# Patient Record
Sex: MALE | Race: BLACK OR AFRICAN AMERICAN | NOT HISPANIC OR LATINO | Employment: FULL TIME | URBAN - METROPOLITAN AREA
[De-identification: names, ages, dates, MRNs, and addresses within clinical notes are randomized per-mention and may not be internally consistent; named-entity substitution may affect disease eponyms.]

---

## 2024-09-05 ENCOUNTER — APPOINTMENT (OUTPATIENT)
Dept: CARDIOLOGY | Facility: HOSPITAL | Age: 39
DRG: 854 | End: 2024-09-05
Payer: COMMERCIAL

## 2024-09-05 ENCOUNTER — APPOINTMENT (OUTPATIENT)
Dept: RADIOLOGY | Facility: HOSPITAL | Age: 39
DRG: 854 | End: 2024-09-05
Payer: COMMERCIAL

## 2024-09-05 ENCOUNTER — HOSPITAL ENCOUNTER (INPATIENT)
Facility: HOSPITAL | Age: 39
End: 2024-09-05
Attending: EMERGENCY MEDICINE | Admitting: INTERNAL MEDICINE
Payer: COMMERCIAL

## 2024-09-05 DIAGNOSIS — M00.869 BACTERIAL INFECTION OF KNEE JOINT (MULTI): ICD-10-CM

## 2024-09-05 DIAGNOSIS — E13.69 OTHER SPECIFIED DIABETES MELLITUS WITH OTHER SPECIFIED COMPLICATION, WITHOUT LONG-TERM CURRENT USE OF INSULIN: ICD-10-CM

## 2024-09-05 DIAGNOSIS — R19.7 DIARRHEA, UNSPECIFIED TYPE: ICD-10-CM

## 2024-09-05 DIAGNOSIS — Z98.890 STATUS POST INCISION AND DRAINAGE: ICD-10-CM

## 2024-09-05 DIAGNOSIS — N17.9 AKI (ACUTE KIDNEY INJURY) (CMS-HCC): ICD-10-CM

## 2024-09-05 DIAGNOSIS — R60.0 PEDAL EDEMA: ICD-10-CM

## 2024-09-05 DIAGNOSIS — D72.825 BANDEMIA: ICD-10-CM

## 2024-09-05 DIAGNOSIS — I10 PRIMARY HYPERTENSION: ICD-10-CM

## 2024-09-05 DIAGNOSIS — A41.9 SEPSIS (MULTI): Primary | ICD-10-CM

## 2024-09-05 DIAGNOSIS — E11.9 CONTROLLED TYPE 2 DIABETES MELLITUS WITHOUT COMPLICATION, WITHOUT LONG-TERM CURRENT USE OF INSULIN (MULTI): ICD-10-CM

## 2024-09-05 LAB
ALBUMIN SERPL BCP-MCNC: 3.9 G/DL (ref 3.4–5)
ALP SERPL-CCNC: 79 U/L (ref 33–120)
ALT SERPL W P-5'-P-CCNC: 73 U/L (ref 10–52)
ANION GAP SERPL CALC-SCNC: 20 MMOL/L (ref 10–20)
APAP SERPL-MCNC: <10 UG/ML
AST SERPL W P-5'-P-CCNC: 57 U/L (ref 9–39)
BASOPHILS # BLD MANUAL: 0.25 X10*3/UL (ref 0–0.1)
BASOPHILS NFR BLD MANUAL: 1 %
BILIRUB SERPL-MCNC: 0.6 MG/DL (ref 0–1.2)
BUN SERPL-MCNC: 29 MG/DL (ref 6–23)
CALCIUM SERPL-MCNC: 8.2 MG/DL (ref 8.6–10.3)
CARDIAC TROPONIN I PNL SERPL HS: 14 NG/L (ref 0–20)
CHLORIDE SERPL-SCNC: 95 MMOL/L (ref 98–107)
CK SERPL-CCNC: 49 U/L (ref 0–325)
CO2 SERPL-SCNC: 23 MMOL/L (ref 21–32)
CREAT SERPL-MCNC: 2.66 MG/DL (ref 0.5–1.3)
CRP SERPL-MCNC: 40.8 MG/DL
EGFRCR SERPLBLD CKD-EPI 2021: 30 ML/MIN/1.73M*2
EOSINOPHIL # BLD MANUAL: 0 X10*3/UL (ref 0–0.7)
EOSINOPHIL NFR BLD MANUAL: 0 %
ERYTHROCYTE [DISTWIDTH] IN BLOOD BY AUTOMATED COUNT: 12.5 % (ref 11.5–14.5)
ERYTHROCYTE [SEDIMENTATION RATE] IN BLOOD BY WESTERGREN METHOD: 106 MM/H (ref 0–15)
FLUAV RNA RESP QL NAA+PROBE: NOT DETECTED
FLUBV RNA RESP QL NAA+PROBE: NOT DETECTED
GLUCOSE SERPL-MCNC: 192 MG/DL (ref 74–99)
HCT VFR BLD AUTO: 42.8 % (ref 41–52)
HGB BLD-MCNC: 15.1 G/DL (ref 13.5–17.5)
IMM GRANULOCYTES # BLD AUTO: 2.06 X10*3/UL (ref 0–0.7)
IMM GRANULOCYTES NFR BLD AUTO: 8.3 % (ref 0–0.9)
LACTATE SERPL-SCNC: 1.9 MMOL/L (ref 0.4–2)
LIPASE SERPL-CCNC: 25 U/L (ref 9–82)
LYMPHOCYTES # BLD MANUAL: 1.24 X10*3/UL (ref 1.2–4.8)
LYMPHOCYTES NFR BLD MANUAL: 5 %
MCH RBC QN AUTO: 27.9 PG (ref 26–34)
MCHC RBC AUTO-ENTMCNC: 35.3 G/DL (ref 32–36)
MCV RBC AUTO: 79 FL (ref 80–100)
MONOCYTES # BLD MANUAL: 0.49 X10*3/UL (ref 0.1–1)
MONOCYTES NFR BLD MANUAL: 2 %
NEUTROPHILS # BLD MANUAL: 22.72 X10*3/UL (ref 1.2–7.7)
NEUTS BAND # BLD MANUAL: 3.95 X10*3/UL (ref 0–0.7)
NEUTS BAND NFR BLD MANUAL: 16 %
NEUTS SEG # BLD MANUAL: 18.77 X10*3/UL (ref 1.2–7)
NEUTS SEG NFR BLD MANUAL: 76 %
NRBC BLD-RTO: 0 /100 WBCS (ref 0–0)
PLATELET # BLD AUTO: 206 X10*3/UL (ref 150–450)
POTASSIUM SERPL-SCNC: 3.7 MMOL/L (ref 3.5–5.3)
PROT SERPL-MCNC: 8 G/DL (ref 6.4–8.2)
RBC # BLD AUTO: 5.41 X10*6/UL (ref 4.5–5.9)
RBC MORPH BLD: ABNORMAL
S PYO DNA THROAT QL NAA+PROBE: NOT DETECTED
SARS-COV-2 RNA RESP QL NAA+PROBE: NOT DETECTED
SODIUM SERPL-SCNC: 134 MMOL/L (ref 136–145)
TOTAL CELLS COUNTED BLD: 100
TSH SERPL-ACNC: 4.22 MIU/L (ref 0.44–3.98)
WBC # BLD AUTO: 24.7 X10*3/UL (ref 4.4–11.3)

## 2024-09-05 PROCEDURE — 85652 RBC SED RATE AUTOMATED: CPT | Performed by: EMERGENCY MEDICINE

## 2024-09-05 PROCEDURE — 2500000004 HC RX 250 GENERAL PHARMACY W/ HCPCS (ALT 636 FOR OP/ED): Performed by: INTERNAL MEDICINE

## 2024-09-05 PROCEDURE — 70450 CT HEAD/BRAIN W/O DYE: CPT | Performed by: RADIOLOGY

## 2024-09-05 PROCEDURE — 86140 C-REACTIVE PROTEIN: CPT | Performed by: EMERGENCY MEDICINE

## 2024-09-05 PROCEDURE — 82550 ASSAY OF CK (CPK): CPT | Performed by: EMERGENCY MEDICINE

## 2024-09-05 PROCEDURE — 99285 EMERGENCY DEPT VISIT HI MDM: CPT | Mod: 25

## 2024-09-05 PROCEDURE — 2500000005 HC RX 250 GENERAL PHARMACY W/O HCPCS: Performed by: EMERGENCY MEDICINE

## 2024-09-05 PROCEDURE — 93005 ELECTROCARDIOGRAM TRACING: CPT

## 2024-09-05 PROCEDURE — 84443 ASSAY THYROID STIM HORMONE: CPT | Performed by: EMERGENCY MEDICINE

## 2024-09-05 PROCEDURE — 84484 ASSAY OF TROPONIN QUANT: CPT | Performed by: EMERGENCY MEDICINE

## 2024-09-05 PROCEDURE — 74176 CT ABD & PELVIS W/O CONTRAST: CPT | Performed by: RADIOLOGY

## 2024-09-05 PROCEDURE — 87476 LYME DIS DNA AMP PROBE: CPT | Performed by: EMERGENCY MEDICINE

## 2024-09-05 PROCEDURE — 96365 THER/PROPH/DIAG IV INF INIT: CPT

## 2024-09-05 PROCEDURE — 87651 STREP A DNA AMP PROBE: CPT | Performed by: EMERGENCY MEDICINE

## 2024-09-05 PROCEDURE — 96366 THER/PROPH/DIAG IV INF ADDON: CPT

## 2024-09-05 PROCEDURE — 80143 DRUG ASSAY ACETAMINOPHEN: CPT | Performed by: EMERGENCY MEDICINE

## 2024-09-05 PROCEDURE — 71046 X-RAY EXAM CHEST 2 VIEWS: CPT

## 2024-09-05 PROCEDURE — 87181 SC STD AGAR DILUTION PER AGT: CPT | Mod: AHULAB | Performed by: EMERGENCY MEDICINE

## 2024-09-05 PROCEDURE — 71250 CT THORAX DX C-: CPT | Performed by: RADIOLOGY

## 2024-09-05 PROCEDURE — 96375 TX/PRO/DX INJ NEW DRUG ADDON: CPT

## 2024-09-05 PROCEDURE — 87636 SARSCOV2 & INF A&B AMP PRB: CPT | Performed by: EMERGENCY MEDICINE

## 2024-09-05 PROCEDURE — 85027 COMPLETE CBC AUTOMATED: CPT | Performed by: EMERGENCY MEDICINE

## 2024-09-05 PROCEDURE — 87077 CULTURE AEROBIC IDENTIFY: CPT | Mod: AHULAB | Performed by: EMERGENCY MEDICINE

## 2024-09-05 PROCEDURE — 83690 ASSAY OF LIPASE: CPT | Performed by: EMERGENCY MEDICINE

## 2024-09-05 PROCEDURE — 71046 X-RAY EXAM CHEST 2 VIEWS: CPT | Performed by: RADIOLOGY

## 2024-09-05 PROCEDURE — 70450 CT HEAD/BRAIN W/O DYE: CPT

## 2024-09-05 PROCEDURE — 1210000001 HC SEMI-PRIVATE ROOM DAILY

## 2024-09-05 PROCEDURE — 2500000004 HC RX 250 GENERAL PHARMACY W/ HCPCS (ALT 636 FOR OP/ED): Performed by: EMERGENCY MEDICINE

## 2024-09-05 PROCEDURE — 96367 TX/PROPH/DG ADDL SEQ IV INF: CPT

## 2024-09-05 PROCEDURE — 74176 CT ABD & PELVIS W/O CONTRAST: CPT

## 2024-09-05 PROCEDURE — 83605 ASSAY OF LACTIC ACID: CPT | Performed by: EMERGENCY MEDICINE

## 2024-09-05 PROCEDURE — 36415 COLL VENOUS BLD VENIPUNCTURE: CPT | Performed by: EMERGENCY MEDICINE

## 2024-09-05 PROCEDURE — 84439 ASSAY OF FREE THYROXINE: CPT | Performed by: EMERGENCY MEDICINE

## 2024-09-05 PROCEDURE — 80053 COMPREHEN METABOLIC PANEL: CPT | Performed by: EMERGENCY MEDICINE

## 2024-09-05 PROCEDURE — 85007 BL SMEAR W/DIFF WBC COUNT: CPT | Performed by: EMERGENCY MEDICINE

## 2024-09-05 RX ORDER — SODIUM CHLORIDE, SODIUM LACTATE, POTASSIUM CHLORIDE, CALCIUM CHLORIDE 600; 310; 30; 20 MG/100ML; MG/100ML; MG/100ML; MG/100ML
125 INJECTION, SOLUTION INTRAVENOUS CONTINUOUS
Status: ACTIVE | OUTPATIENT
Start: 2024-09-05

## 2024-09-05 RX ORDER — PANTOPRAZOLE SODIUM 40 MG/1
40 TABLET, DELAYED RELEASE ORAL
Status: DISCONTINUED | OUTPATIENT
Start: 2024-09-06 | End: 2024-09-06 | Stop reason: SDUPTHER

## 2024-09-05 RX ORDER — TALC
3 POWDER (GRAM) TOPICAL NIGHTLY PRN
Status: DISPENSED | OUTPATIENT
Start: 2024-09-05

## 2024-09-05 RX ORDER — ONDANSETRON HYDROCHLORIDE 2 MG/ML
4 INJECTION, SOLUTION INTRAVENOUS EVERY 6 HOURS PRN
Status: DISCONTINUED | OUTPATIENT
Start: 2024-09-05 | End: 2024-09-06 | Stop reason: SDUPTHER

## 2024-09-05 RX ORDER — AMLODIPINE BESYLATE 5 MG/1
5 TABLET ORAL DAILY
Status: DISPENSED | OUTPATIENT
Start: 2024-09-06

## 2024-09-05 RX ORDER — HYDROMORPHONE HYDROCHLORIDE 0.2 MG/ML
0.2 INJECTION INTRAMUSCULAR; INTRAVENOUS; SUBCUTANEOUS
Status: DISCONTINUED | OUTPATIENT
Start: 2024-09-05 | End: 2024-09-06

## 2024-09-05 RX ORDER — TRAMADOL HYDROCHLORIDE 50 MG/1
50 TABLET ORAL EVERY 6 HOURS PRN
Status: DISPENSED | OUTPATIENT
Start: 2024-09-05

## 2024-09-05 RX ORDER — SODIUM CHLORIDE 9 MG/ML
100 INJECTION, SOLUTION INTRAVENOUS CONTINUOUS
Status: ACTIVE | OUTPATIENT
Start: 2024-09-05

## 2024-09-05 RX ORDER — ACETAMINOPHEN 325 MG/1
975 TABLET ORAL ONCE
Status: COMPLETED | OUTPATIENT
Start: 2024-09-05 | End: 2024-09-05

## 2024-09-05 RX ORDER — ALUMINUM HYDROXIDE, MAGNESIUM HYDROXIDE, AND SIMETHICONE 1200; 120; 1200 MG/30ML; MG/30ML; MG/30ML
20 SUSPENSION ORAL 4 TIMES DAILY PRN
Status: ACTIVE | OUTPATIENT
Start: 2024-09-05

## 2024-09-05 RX ORDER — MORPHINE SULFATE 2 MG/ML
2 INJECTION, SOLUTION INTRAMUSCULAR; INTRAVENOUS ONCE
Status: COMPLETED | OUTPATIENT
Start: 2024-09-05 | End: 2024-09-05

## 2024-09-05 RX ORDER — ACETAMINOPHEN 325 MG/1
650 TABLET ORAL EVERY 6 HOURS PRN
Status: DISCONTINUED | OUTPATIENT
Start: 2024-09-05 | End: 2024-09-06 | Stop reason: SDUPTHER

## 2024-09-05 ASSESSMENT — PAIN DESCRIPTION - FREQUENCY: FREQUENCY: CONSTANT/CONTINUOUS

## 2024-09-05 ASSESSMENT — PAIN SCALES - GENERAL
PAINLEVEL_OUTOF10: 10 - WORST POSSIBLE PAIN
PAINLEVEL_OUTOF10: 3
PAINLEVEL_OUTOF10: 10 - WORST POSSIBLE PAIN

## 2024-09-05 ASSESSMENT — COLUMBIA-SUICIDE SEVERITY RATING SCALE - C-SSRS
6. HAVE YOU EVER DONE ANYTHING, STARTED TO DO ANYTHING, OR PREPARED TO DO ANYTHING TO END YOUR LIFE?: NO
2. HAVE YOU ACTUALLY HAD ANY THOUGHTS OF KILLING YOURSELF?: NO
1. IN THE PAST MONTH, HAVE YOU WISHED YOU WERE DEAD OR WISHED YOU COULD GO TO SLEEP AND NOT WAKE UP?: NO

## 2024-09-05 ASSESSMENT — PAIN DESCRIPTION - LOCATION
LOCATION: OTHER (COMMENT)
LOCATION: CHEST
LOCATION: LEG

## 2024-09-05 ASSESSMENT — PAIN - FUNCTIONAL ASSESSMENT
PAIN_FUNCTIONAL_ASSESSMENT: 0-10
PAIN_FUNCTIONAL_ASSESSMENT: 0-10

## 2024-09-05 NOTE — ED TRIAGE NOTES
Pt presents to the ED from home with c/o body pain. Pt states this started yesterday around 9 am and it started with a headache, which pt states is the worst of his life. Pt states he can't endorse numbness or tingling, but pt did limp into triage and states he doesn't usually ambulate this way. Pt endorses headache is worsening throughout the day. Pt having difficulty moving certain extremities. Pt took ibuprofen.

## 2024-09-05 NOTE — ED TRIAGE NOTES
TRIAGE NOTE   I saw the patient as the Clinician in Triage and performed a brief history and physical exam, established acuity, and ordered appropriate tests to develop basic plan of care. Patient will be seen by an IVETTE, resident and/or physician who will independently evaluate the patient. Please see subsequent provider notes for further details and disposition.     Brief HPI: In brief, Gabe Linder Jr. is a 39 y.o. male that presents for headache, nausea, vomiting, diarrhea, diffuse bodyaches progressing over the last 48 hours.  Notes difficulty ambulating due to diffuse myalgias/arthralgias, denies any unilateral numbness or weakness in any of his extremities.  Notes headache was progressive in onset, worsened over the course of the day, was not maximal at onset.     Focused Physical exam:   Uncomfortable appearing, tachycardic, moderate lower abdominal tenderness worse over the right lower quadrant.  No CVA tenderness.  Intact strength, sensation bilateral upper and lower extremities, no tremor, clonus.  Full range of motion of the neck without pain.  Plan/MDM:   Patient with myalgias, nausea, vomiting, headache, fatigue, concern for precipitating infectious etiology namely intra-abdominal source given lower abdominal tenderness.  Concern for rhabdomyolysis/severe viral syndrome given diffuse myalgias, no isolated joint swelling or pain to suggest septic arthritis.  No focal neurological deficit on examination to suggest CVA, spinal pathology, patient does have a significant headache but was not maximal at onset, has no meningeal findings on examination, lower suspicion for subarachnoid hemorrhage, ICH, CNS infectious process.  Will assess with labs, imaging, urinalysis, viral testing.  Disposition pending above, reassessment.  Please see subsequent provider note for further details and disposition

## 2024-09-06 LAB
ANION GAP SERPL CALC-SCNC: 16 MMOL/L (ref 10–20)
APPEARANCE UR: CLEAR
BILIRUB UR STRIP.AUTO-MCNC: NEGATIVE MG/DL
BUN SERPL-MCNC: 17 MG/DL (ref 6–23)
C TRACH RRNA SPEC QL NAA+PROBE: NEGATIVE
CALCIUM SERPL-MCNC: 7.5 MG/DL (ref 8.6–10.3)
CHLORIDE SERPL-SCNC: 95 MMOL/L (ref 98–107)
CO2 SERPL-SCNC: 23 MMOL/L (ref 21–32)
COLOR UR: ABNORMAL
CREAT SERPL-MCNC: 1.09 MG/DL (ref 0.5–1.3)
CREAT UR-MCNC: 141 MG/DL (ref 20–370)
EGFRCR SERPLBLD CKD-EPI 2021: 88 ML/MIN/1.73M*2
ERYTHROCYTE [DISTWIDTH] IN BLOOD BY AUTOMATED COUNT: 12.3 % (ref 11.5–14.5)
GLUCOSE SERPL-MCNC: 211 MG/DL (ref 74–99)
GLUCOSE UR STRIP.AUTO-MCNC: NORMAL MG/DL
HAV IGM SER QL: NONREACTIVE
HBV CORE IGM SER QL: NONREACTIVE
HBV SURFACE AG SERPL QL IA: NONREACTIVE
HCT VFR BLD AUTO: 37 % (ref 41–52)
HCV AB SER QL: NONREACTIVE
HGB BLD-MCNC: 12.5 G/DL (ref 13.5–17.5)
KETONES UR STRIP.AUTO-MCNC: NEGATIVE MG/DL
LEUKOCYTE ESTERASE UR QL STRIP.AUTO: NEGATIVE
MCH RBC QN AUTO: 26.9 PG (ref 26–34)
MCHC RBC AUTO-ENTMCNC: 33.8 G/DL (ref 32–36)
MCV RBC AUTO: 80 FL (ref 80–100)
MUCOUS THREADS #/AREA URNS AUTO: NORMAL /LPF
N GONORRHOEA DNA SPEC QL PROBE+SIG AMP: NEGATIVE
NITRITE UR QL STRIP.AUTO: NEGATIVE
NRBC BLD-RTO: 0 /100 WBCS (ref 0–0)
PH UR STRIP.AUTO: 5.5 [PH]
PLATELET # BLD AUTO: 170 X10*3/UL (ref 150–450)
POTASSIUM SERPL-SCNC: 3.5 MMOL/L (ref 3.5–5.3)
PROT UR STRIP.AUTO-MCNC: ABNORMAL MG/DL
RBC # BLD AUTO: 4.65 X10*6/UL (ref 4.5–5.9)
RBC # UR STRIP.AUTO: ABNORMAL /UL
RBC #/AREA URNS AUTO: NORMAL /HPF
SODIUM SERPL-SCNC: 130 MMOL/L (ref 136–145)
SODIUM UR-SCNC: 46 MMOL/L
SODIUM/CREAT UR-RTO: 33 MMOL/G CREAT
SP GR UR STRIP.AUTO: 1.02
T4 FREE SERPL-MCNC: 1.08 NG/DL (ref 0.61–1.12)
URATE CRY #/AREA UR COMP ASSIST: NORMAL /HPF
UROBILINOGEN UR STRIP.AUTO-MCNC: NORMAL MG/DL
WBC # BLD AUTO: 20.9 X10*3/UL (ref 4.4–11.3)
WBC #/AREA URNS AUTO: NORMAL /HPF

## 2024-09-06 PROCEDURE — 86038 ANTINUCLEAR ANTIBODIES: CPT | Mod: AHULAB | Performed by: INTERNAL MEDICINE

## 2024-09-06 PROCEDURE — 87506 IADNA-DNA/RNA PROBE TQ 6-11: CPT | Mod: AHULAB | Performed by: EMERGENCY MEDICINE

## 2024-09-06 PROCEDURE — 85027 COMPLETE CBC AUTOMATED: CPT | Performed by: INTERNAL MEDICINE

## 2024-09-06 PROCEDURE — 99222 1ST HOSP IP/OBS MODERATE 55: CPT | Performed by: INTERNAL MEDICINE

## 2024-09-06 PROCEDURE — 36415 COLL VENOUS BLD VENIPUNCTURE: CPT | Performed by: INTERNAL MEDICINE

## 2024-09-06 PROCEDURE — 1100000001 HC PRIVATE ROOM DAILY

## 2024-09-06 PROCEDURE — 80074 ACUTE HEPATITIS PANEL: CPT | Mod: AHULAB | Performed by: EMERGENCY MEDICINE

## 2024-09-06 PROCEDURE — 80048 BASIC METABOLIC PNL TOTAL CA: CPT | Performed by: INTERNAL MEDICINE

## 2024-09-06 PROCEDURE — 36415 COLL VENOUS BLD VENIPUNCTURE: CPT | Performed by: EMERGENCY MEDICINE

## 2024-09-06 PROCEDURE — 84300 ASSAY OF URINE SODIUM: CPT | Performed by: EMERGENCY MEDICINE

## 2024-09-06 PROCEDURE — 2500000001 HC RX 250 WO HCPCS SELF ADMINISTERED DRUGS (ALT 637 FOR MEDICARE OP): Performed by: INTERNAL MEDICINE

## 2024-09-06 PROCEDURE — 82570 ASSAY OF URINE CREATININE: CPT | Performed by: EMERGENCY MEDICINE

## 2024-09-06 PROCEDURE — 87491 CHLMYD TRACH DNA AMP PROBE: CPT | Mod: AHULAB | Performed by: EMERGENCY MEDICINE

## 2024-09-06 PROCEDURE — 2500000004 HC RX 250 GENERAL PHARMACY W/ HCPCS (ALT 636 FOR OP/ED): Performed by: INTERNAL MEDICINE

## 2024-09-06 PROCEDURE — 81003 URINALYSIS AUTO W/O SCOPE: CPT | Performed by: EMERGENCY MEDICINE

## 2024-09-06 RX ORDER — ONDANSETRON 4 MG/1
4 TABLET, FILM COATED ORAL EVERY 8 HOURS PRN
Status: ACTIVE | OUTPATIENT
Start: 2024-09-06

## 2024-09-06 RX ORDER — ACETAMINOPHEN 160 MG/5ML
650 SOLUTION ORAL EVERY 4 HOURS PRN
Status: DISPENSED | OUTPATIENT
Start: 2024-09-06

## 2024-09-06 RX ORDER — GUAIFENESIN 600 MG/1
600 TABLET, EXTENDED RELEASE ORAL EVERY 12 HOURS PRN
Status: ACTIVE | OUTPATIENT
Start: 2024-09-06

## 2024-09-06 RX ORDER — ACETAMINOPHEN 650 MG/1
650 SUPPOSITORY RECTAL EVERY 4 HOURS PRN
Status: ACTIVE | OUTPATIENT
Start: 2024-09-06

## 2024-09-06 RX ORDER — ENOXAPARIN SODIUM 100 MG/ML
40 INJECTION SUBCUTANEOUS EVERY 24 HOURS
Status: DISPENSED | OUTPATIENT
Start: 2024-09-06

## 2024-09-06 RX ORDER — POLYETHYLENE GLYCOL 3350 17 G/17G
17 POWDER, FOR SOLUTION ORAL DAILY PRN
Status: ACTIVE | OUTPATIENT
Start: 2024-09-06

## 2024-09-06 RX ORDER — PANTOPRAZOLE SODIUM 40 MG/1
40 TABLET, DELAYED RELEASE ORAL
Status: DISPENSED | OUTPATIENT
Start: 2024-09-06

## 2024-09-06 RX ORDER — ACETAMINOPHEN 325 MG/1
650 TABLET ORAL EVERY 4 HOURS PRN
Status: DISPENSED | OUTPATIENT
Start: 2024-09-06

## 2024-09-06 RX ORDER — OXYCODONE HYDROCHLORIDE 5 MG/1
5 TABLET ORAL EVERY 6 HOURS PRN
Status: DISCONTINUED | OUTPATIENT
Start: 2024-09-06 | End: 2024-09-06

## 2024-09-06 RX ORDER — ONDANSETRON HYDROCHLORIDE 2 MG/ML
4 INJECTION, SOLUTION INTRAVENOUS EVERY 8 HOURS PRN
Status: ACTIVE | OUTPATIENT
Start: 2024-09-06

## 2024-09-06 RX ORDER — PANTOPRAZOLE SODIUM 40 MG/10ML
40 INJECTION, POWDER, LYOPHILIZED, FOR SOLUTION INTRAVENOUS
Status: ACTIVE | OUTPATIENT
Start: 2024-09-06

## 2024-09-06 ASSESSMENT — COGNITIVE AND FUNCTIONAL STATUS - GENERAL
MOBILITY SCORE: 14
DRESSING REGULAR UPPER BODY CLOTHING: A LITTLE
HELP NEEDED FOR BATHING: A LOT
TURNING FROM BACK TO SIDE WHILE IN FLAT BAD: A LITTLE
DRESSING REGULAR LOWER BODY CLOTHING: A LITTLE
STANDING UP FROM CHAIR USING ARMS: A LOT
MOVING TO AND FROM BED TO CHAIR: A LOT
WALKING IN HOSPITAL ROOM: A LOT
CLIMB 3 TO 5 STEPS WITH RAILING: A LOT
MOVING FROM LYING ON BACK TO SITTING ON SIDE OF FLAT BED WITH BEDRAILS: A LITTLE
TOILETING: A LITTLE
DAILY ACTIVITIY SCORE: 19

## 2024-09-06 ASSESSMENT — ACTIVITIES OF DAILY LIVING (ADL): LACK_OF_TRANSPORTATION: NO

## 2024-09-06 ASSESSMENT — PAIN SCALES - GENERAL
PAINLEVEL_OUTOF10: 10 - WORST POSSIBLE PAIN
PAINLEVEL_OUTOF10: 10 - WORST POSSIBLE PAIN
PAINLEVEL_OUTOF10: 0 - NO PAIN
PAINLEVEL_OUTOF10: 8
PAINLEVEL_OUTOF10: 10 - WORST POSSIBLE PAIN
PAINLEVEL_OUTOF10: 8

## 2024-09-06 ASSESSMENT — PAIN DESCRIPTION - LOCATION
LOCATION: OTHER (COMMENT)
LOCATION: KNEE
LOCATION: LEG

## 2024-09-06 ASSESSMENT — PAIN SCALES - WONG BAKER
WONGBAKER_NUMERICALRESPONSE: HURTS WORST
WONGBAKER_NUMERICALRESPONSE: HURTS WORST

## 2024-09-06 ASSESSMENT — PAIN - FUNCTIONAL ASSESSMENT
PAIN_FUNCTIONAL_ASSESSMENT: 0-10

## 2024-09-06 ASSESSMENT — PAIN DESCRIPTION - DESCRIPTORS: DESCRIPTORS: THROBBING

## 2024-09-06 ASSESSMENT — PAIN DESCRIPTION - ORIENTATION: ORIENTATION: LEFT

## 2024-09-06 NOTE — PROGRESS NOTES
Pharmacy Medication History Review   Spoke to the patient. Takes no Medications    Gabe Linder Jr. is a 39 y.o. male admitted for Sepsis (Multi). Pharmacy reviewed the patient's oyxyq-sm-jiehqmyrg medications and allergies for accuracy.    The list below reflectives the updated PTA list. Please review each medication in order reconciliation for additional clarification and justification.       The list below reflectives the updated allergy list. Please review each documented allergy for additional clarification and justification.  Allergies  Reviewed by Danae eWlls RN on 9/5/2024        Severity Reactions Comments    Shellfish Containing Products High Anaphylaxis             Below are additional concerns with the patient's PTA list.      Naomy Lua

## 2024-09-06 NOTE — PROGRESS NOTES
Transitional Care Coordination Progress Note:  Plan per Medical/Surgical team: treatment of sepsis with IV ATB, IV fluids, ID consult   Status: Inpatient   Payor source: [place]  Discharge disposition: Home with sign other   Potential Barriers: , Bp 161/106, renal 29/2.66  ADOD: 9/8/2024   LEONID Kirby RN, BSN Transitional Care Coordinator ED# [phone]      09/06/24 0753   Discharge Planning   Living Arrangements Spouse/significant other;Children   Support Systems Spouse/significant other   Assistance Needed ATB plan per ID   Type of Residence Private residence   Number of Stairs to Enter Residence 3   Number of Stairs Within Residence 12   Home or Post Acute Services None   Expected Discharge Disposition Home   Does the patient need discharge transport arranged? No   Financial Resource Strain   How hard is it for you to pay for the very basics like food, housing, medical care, and heating? Not hard   Housing Stability   In the last 12 months, was there a time when you were not able to pay the mortgage or rent on time? N   In the past 12 months, how many times have you moved where you were living? 1   At any time in the past 12 months, were you homeless or living in a shelter (including now)? N   Transportation Needs   In the past 12 months, has lack of transportation kept you from medical appointments or from getting medications? no   In the past 12 months, has lack of transportation kept you from meetings, work, or from getting things needed for daily living? No

## 2024-09-06 NOTE — ED PROVIDER NOTES
HPI   Chief Complaint   Patient presents with    Body Pain       The patient is a 39-year-old male with negligible past medical history presenting with bodyaches, nausea, vomiting, headache.  Notes that symptoms began yesterday, notes developing nausea, loose stools, had an episode of emesis yesterday.  Notes that at time of index episode of vomiting did develop a moderate headache that is progressively worsened since onset, notes that headache did become unbearable today prompting ED evaluation.  Also notes diffuse pain in his upper and lower extremities, worsened with movement, does not localize this to any 1 joint or extremity, describes pain as equal in all 4 extremities.  Notes pain precluding normal movement of all 4 extremities.  Denies any numbness or weakness in any of his extremities.  Denies any vision changes.  Denies any neck pain.  Denies any sick contacts, denies any recent travel, animal exposures.  Denies any fevers.  Did take ibuprofen for pain over the last 2 days, states that he took approximately 14 tablets over the last 48 hours.  Denies any acetaminophen/salicylate use.  Denies any IV drug use.  Denies any indwelling medical hardware.  Denies any bowel or bladder incontinence.  Denies other recent illness or injury.              Patient History   No past medical history on file.  No past surgical history on file.  No family history on file.  Social History     Tobacco Use    Smoking status: Not on file    Smokeless tobacco: Not on file   Substance Use Topics    Alcohol use: Not on file    Drug use: Not on file       Physical Exam   ED Triage Vitals   Temperature Heart Rate Respirations BP   09/05/24 1545 09/05/24 1545 09/05/24 1545 09/05/24 1545   36.8 °C (98.3 °F) (!) 120 18 126/89      Pulse Ox Temp Source Heart Rate Source Patient Position   09/05/24 1545 09/05/24 1545 09/05/24 1804 --   98 % Oral Monitor       BP Location FiO2 (%)     09/05/24 1804 --     Right arm        Physical  Exam  Vitals and nursing note reviewed.   Constitutional:       General: He is not in acute distress.     Appearance: Normal appearance. He is not ill-appearing or toxic-appearing.      Comments: Mildly uncomfortable appearing   HENT:      Head: Normocephalic and atraumatic. No right periorbital erythema or left periorbital erythema.      Jaw: There is normal jaw occlusion.      Nose: No congestion or rhinorrhea.      Mouth/Throat:      Mouth: Mucous membranes are moist.      Pharynx: Oropharynx is clear. No pharyngeal swelling, oropharyngeal exudate or posterior oropharyngeal erythema.   Eyes:      Extraocular Movements: Extraocular movements intact.      Right eye: Normal extraocular motion and no nystagmus.      Left eye: Normal extraocular motion and no nystagmus.      Conjunctiva/sclera: Conjunctivae normal.      Pupils: Pupils are equal, round, and reactive to light.   Neck:      Thyroid: No thyroid tenderness.      Comments: No midline neck pain with flexion/extension.  Cardiovascular:      Rate and Rhythm: Regular rhythm. Tachycardia present.      Pulses: Normal pulses.           Radial pulses are 2+ on the right side and 2+ on the left side.      Heart sounds: Normal heart sounds, S1 normal and S2 normal. No murmur heard.     No friction rub. No gallop.   Pulmonary:      Effort: Pulmonary effort is normal. No respiratory distress.      Breath sounds: Normal breath sounds. No stridor. No wheezing, rhonchi or rales.   Abdominal:      General: Abdomen is flat. Bowel sounds are normal. There is no distension.      Palpations: Abdomen is soft.      Tenderness: There is generalized abdominal tenderness. There is no right CVA tenderness, left CVA tenderness, guarding or rebound.   Musculoskeletal:      Cervical back: Full passive range of motion without pain, normal range of motion and neck supple. No spinous process tenderness or muscular tenderness. Normal range of motion.      Right lower leg: No edema.       Left lower leg: No edema.      Comments: Diffuse tenderness over bilateral upper and lower extremities.  No edema.  No erythema, no joint effusions noted at the elbows, knees, ankles.  Able to flex/extend elbow, wrist, knee, ankle joints fully but with significant discomfort   Skin:     General: Skin is warm and dry.      Capillary Refill: Capillary refill takes less than 2 seconds.   Neurological:      General: No focal deficit present.      Mental Status: He is alert and oriented to person, place, and time.      GCS: GCS eye subscore is 4. GCS verbal subscore is 5. GCS motor subscore is 6.      Cranial Nerves: Cranial nerves 2-12 are intact. No cranial nerve deficit.      Sensory: No sensory deficit.      Motor: No weakness, tremor or abnormal muscle tone.      Deep Tendon Reflexes:      Reflex Scores:       Patellar reflexes are 2+ on the right side and 2+ on the left side.     Comments: 1a  Level of consciousness: 0=alert; keenly responsive  1b. LOC questions:  0=Performs both tasks correctly  1c. LOC commands: 0=Performs both tasks correctly  2.  Best Gaze: 0=normal  3. Visual: 0=No visual loss  4. Facial Palsy: 0=Normal symmetric movement  5a. Motor left arm: 0=No drift, limb holds 90 (or 45) degrees for full 10 seconds  5b.  Motor right arm: 0=No drift, limb holds 90 (or 45) degrees for full 10 seconds  6a. motor left le=No drift, limb holds 90 (or 45) degrees for full 10 seconds  6b  Motor right le=No drift, limb holds 90 (or 45) degrees for full 10 seconds  7. Limb Ataxia: 0=Absent  8.  Sensory: 0=Normal; no sensory loss  9. Best Language:  0=No aphasia, normal  10. Dysarthria: 0=Normal  11. Extinction and Inattention: 0=No abnormality    Total:   0        VAN: VAN: Negative       Psychiatric:         Mood and Affect: Mood normal.           ED Course & MDM   Diagnoses as of 24 [id number]   RUPERTO (acute kidney injury) (CMS-formerly [facility])   Bandemia   Diarrhea, unspecified type   Sepsis (Multi)                  No data recorded     [place] Coma Scale Score: 15 (09/05/24 1544 : Danae Wells RN)                           Medical Decision Making  ECG: Sinus tachycardia, rate 124, QRS 88, QTc 476.  No ST changes meeting STEMI criteria, no evidence of preexcitation syndrome/dysrhythmia.    Patient presenting with diffuse myalgias/arthralgias, headache, vomiting.  Overall seems most consistent with a significant viral syndrome but will assess for other precipitating infectious process with labs, urinalysis, chest x-ray.  Patient does describe a headache but notes that it progressively worsened over the course the last day, was not maximal at onset, has been gradually improving since arrival to the emergency department without intervention.  Lower suspicion for intracranial hemorrhage/subarachnoid hemorrhage as pain was not maximal at onset and has a benign neurological examination.  Consideration for CNS infectious process however patient is not altered, has no rash, does not appear to have any meningeal findings on examination.  Consideration for acute intra-abdominal pathology namely colitis, gastroenteritis, appendicitis, diverticulitis, hepatobiliary pathology therefore obtain CT imaging the abdomen and pelvis.  Given associated headache will obtain CT head as well.    Chest x-ray negative, labs remarkable for markedly elevated creatinine from prior concerning for RUPERTO, did obtain CK out of concern for rhabdomyolysis precipitating RUPERTO, this was unremarkable.  Cell count showing leukocytosis with bandemia therefore blood cultures were obtained and broad-spectrum antibiotics were initiated, CT imaging of the chest as well as abdomen and pelvis were obtained to assess for source of infection which showed no acute intrathoracic/intra-abdominal pathology.  CT head negative as well.  Did reassess the patient and continues to note resolution of headache, continues to demonstrate no meningeal findings on examination to suggest  CNS infectious process, at this point given low pretest probability for CNS infectious etiology I feel that risk of lumbar puncture outweighs potential benefit at this point.  Viral testing, strep testing was negative, did obtain blood cultures prior to antibiotic administration, at a concern for pro inflammatory process also obtained ESR/CRP which were moderately elevated.  On examination patient has diffuse arthralgias/myalgias but no localizing joint abnormalities to suggest septic arthritis.  Patient's tachycardia improving with IV fluid administration in the emergency department and he remains afebrile.  Given diarrhea earlier, did obtain stool pathogen testing as well given bandemia of unclear source, did discuss patient with Dr. Monroy who agrees admit the patient to his service.  Admitted in stable condition        Procedure  Procedures     Chinmay Hardin MD  09/05/24 5974

## 2024-09-06 NOTE — PROGRESS NOTES
Home with sign other      09/06/24 6025   Current Planned Discharge Disposition   Current Planned Discharge Disposition Home

## 2024-09-06 NOTE — H&P
Gabe Linder Jr. is a 39 y.o. male   HPI   Patient with a past medical history of erectile dysfunction was doing well until a few days ago when he started having generalized aches pains and stiffness  Had a couple of bouts of loose stool but no other symptoms  No dysuria or frequency chest pain shortness of breath cough  Because of the persistent pain and stiffness came to the emergency room he was noted to have elevated white counts    No recent travels  No sick contacts in the family  No other sick individuals in the family    Past Medical History  No past medical history on file.    Surgical History  No past surgical history on file.     Social History  He has no history on file for tobacco use, alcohol use, and drug use.    Family History  No family history on file.     Allergies  Shellfish containing products    Review of Systems     Constitutional: Feeling poorly  Eyes: no blurred vision and no diplopia.   ENT: no hearing loss, no tinnitus, no earache, no sore throat, no hoarseness and no swollen glands in the neck.   Cardiovascular: no chest pain, no tightness or heavy pressure, no shortness of breath, no palpitations and no lower extremity edema.   Respiratory: no cough, wheezing or shortness of breath at rest or exertion  Gastrointestinal: Diarrhea  Genitourinary: no urinary frequency, no dysuria, no hematuria, no burning sensation during urination, urinary stream is not smaller and urinary stream does not start and stop.   Musculoskeletal: Generalized stiffness  Skin: no rashes, no change in skin color and pigmentation, no skin lesions and no skin lumps.   Neurological: no headaches, no dizziness, no seizures, no tingling, no numbness, no signs and symptoms of stroke and no limb weakness.   Psychiatric: no confusion, no memory lapses or loss, no depression and no sleep disturbances.   Endocrine: no goiter, no thyroid disorder, no diabetes mellitus, no excessive thirst, no dry skin, no cold intolerance,  no heat intolerance and no increased urinary frequency.   Hematologic/Lymphatic: is not slow to heal, does not bleed easily, does not bruise easily, no thrombophlebitis, no anemia and no history of blood transfusion.   All other systems have been reviewed and are negative for complaint.     Vitals:    09/06/24 1217   BP: (!) 135/92   Pulse: (!) 102   Resp: 16   Temp: 36.9 °C (98.4 °F)   SpO2: (!) 93%        Scheduled medications  amLODIPine, 5 mg, oral, Daily  enoxaparin, 40 mg, subcutaneous, q24h  pantoprazole, 40 mg, oral, Daily before breakfast   Or  pantoprazole, 40 mg, intravenous, Daily before breakfast  piperacillin-tazobactam, 3.375 g, intravenous, q6h      Continuous medications  lactated Ringer's, 125 mL/hr, Last Rate: Stopped (09/05/24 2308)  sodium chloride 0.9%, 100 mL/hr, Last Rate: 100 mL/hr (09/06/24 0853)      PRN medications  PRN medications: acetaminophen **OR** acetaminophen **OR** acetaminophen, alum-mag hydroxide-simeth, guaiFENesin, HYDROmorphone, melatonin, ondansetron **OR** ondansetron, polyethylene glycol, traMADol    Results from last 7 days   Lab Units 09/06/24  1006 09/05/24  1618   WBC AUTO x10*3/uL 20.9* 24.7*   HEMOGLOBIN g/dL 12.5* 15.1   HEMATOCRIT % 37.0* 42.8   PLATELETS AUTO x10*3/uL 170 206     Results from last 7 days   Lab Units 09/06/24  1006 09/05/24  1618   SODIUM mmol/L 130* 134*   POTASSIUM mmol/L 3.5 3.7   CHLORIDE mmol/L 95* 95*   CO2 mmol/L 23 23   BUN mg/dL 17 29*   CREATININE mg/dL 1.09 2.66*   CALCIUM mg/dL 7.5* 8.2*   PROTEIN TOTAL g/dL  --  8.0   BILIRUBIN TOTAL mg/dL  --  0.6   ALK PHOS U/L  --  79   ALT U/L  --  73*   AST U/L  --  57*   GLUCOSE mg/dL 211* 192*     Results from last 7 days   Lab Units 09/05/24  1618   TROPHS ng/L 14        CT head wo IV contrast   Final Result   1. No acute intracranial abnormality identified.   2. Small chronic appearing defect along the left parietal calvarium   with overlying soft tissue density/scarring. Correlate for prior    procedure such as dallas hole at this site.             If there is concern for ongoing intracranial abnormality then MRI may   be performed for further follow-up.        MACRO:   None        Signed by: Sha Lombardo 9/5/2024 7:35 PM   Dictation workstation:   [id number]      CT chest abdomen pelvis wo IV contrast   Final Result   1. Hepatomegaly and hepatic steatosis.   2. Soft tissue attenuating exophytic lesion arising from the   posterior aspect of the left kidney, incompletely characterized on   this exam. Recommend follow-up renal mass CT or MRI for further   assessment.        MACRO:   None        Signed by: Sha Lombardo 9/5/2024 7:53 PM   Dictation workstation:   [id number]      XR chest 2 views   Final Result   No acute pathologic findings are identified on this exam.        MACRO:   none        Signed by: Gabe White 9/5/2024 4:56 PM   Dictation workstation:   [id number]          Physical Exam      Constitutional   General appearance: Alert   Eyes   Inspection of eyes: Sclera and conjunctiva were normal.    Pupil exam: Pupils were equal in size. Extraocular movements were intact.   Pulmonary   Respiratory assessment: No respiratory distress, normal respiratory rhythm and effort.    Auscultation of Lungs: Clear bilateral breath sounds.   Cardiovascular   Auscultation of heart: Apical pulse normal, heart rate and rhythm normal, normal S1 and S2, no murmurs and no pericardial rub.    Exam for edema: No peripheral edema.   Abdomen   Abdominal Exam: No bruits, normal bowel sounds, soft, non-tender, no abdominal mass palpated.  Generalized tenderness  Liver and Spleen exam: No hepato-splenomegaly.   Musculoskeletal   Examination of gait: Normal.    Generalized tenderness  Skin   Skin inspection: Normal skin color and pigmentation, normal skin turgor and no visible rash.   Neurologic   Cranial nerves: Nerves 2-12 were intact, no focal neuro defects.   Psychiatric   Orientation: Oriented to person, place,  and time.    Mood and affect: Normal.      Assessment/Plan      #Gram-negative bacteremia  Unclear source  Noted kidney findings on CAT scan  We will check an MRI  Continue broad-spectrum antibiotics  ID consulted    #Diarrhea  Check stool for PCR    #Generalized myalgias and arthralgias  Elevated CRP RP and ESR noted  Likely brought on by the sepsis    #Hypertension  #Acute kidney injury  Continue IV fluids

## 2024-09-06 NOTE — PROGRESS NOTES
09/06/24 0752   [facility] Disability Status   Are you deaf or do you have serious difficulty hearing? N   Are you blind or do you have serious difficulty seeing, even when wearing glasses? N   Because of a physical, mental, or emotional condition, do you have serious difficulty concentrating, remembering, or making decisions? (5 years old or older) N   Do you have serious difficulty walking or climbing stairs? N   Do you have serious difficulty dressing or bathing? N   Because of a physical, mental, or emotional condition, do you have serious difficulty doing errands alone such as visiting the doctor? N

## 2024-09-07 LAB
ANION GAP SERPL CALC-SCNC: 13 MMOL/L (ref 10–20)
ATRIAL RATE: 124 BPM
BUN SERPL-MCNC: 8 MG/DL (ref 6–23)
C COLI+JEJ+UPSA DNA STL QL NAA+PROBE: NOT DETECTED
CALCIUM SERPL-MCNC: 8.5 MG/DL (ref 8.6–10.3)
CHLORIDE SERPL-SCNC: 96 MMOL/L (ref 98–107)
CO2 SERPL-SCNC: 27 MMOL/L (ref 21–32)
CREAT SERPL-MCNC: 0.84 MG/DL (ref 0.5–1.3)
EC STX1 GENE STL QL NAA+PROBE: NOT DETECTED
EC STX2 GENE STL QL NAA+PROBE: NOT DETECTED
EGFRCR SERPLBLD CKD-EPI 2021: >90 ML/MIN/1.73M*2
ERYTHROCYTE [DISTWIDTH] IN BLOOD BY AUTOMATED COUNT: 12.2 % (ref 11.5–14.5)
EST. AVERAGE GLUCOSE BLD GHB EST-MCNC: 220 MG/DL
GLUCOSE SERPL-MCNC: 169 MG/DL (ref 74–99)
HBA1C MFR BLD: 9.3 %
HCT VFR BLD AUTO: 36.4 % (ref 41–52)
HGB BLD-MCNC: 12.1 G/DL (ref 13.5–17.5)
MCH RBC QN AUTO: 27.1 PG (ref 26–34)
MCHC RBC AUTO-ENTMCNC: 33.2 G/DL (ref 32–36)
MCV RBC AUTO: 82 FL (ref 80–100)
NOROVIRUS GI + GII RNA STL NAA+PROBE: NOT DETECTED
NRBC BLD-RTO: 0 /100 WBCS (ref 0–0)
P AXIS: 54 DEGREES
P OFFSET: 197 MS
P ONSET: 145 MS
PLATELET # BLD AUTO: 207 X10*3/UL (ref 150–450)
POTASSIUM SERPL-SCNC: 3.4 MMOL/L (ref 3.5–5.3)
PR INTERVAL: 134 MS
Q ONSET: 212 MS
QRS COUNT: 20 BEATS
QRS DURATION: 88 MS
QT INTERVAL: 332 MS
QTC CALCULATION(BAZETT): 476 MS
QTC FREDERICIA: 422 MS
R AXIS: 8 DEGREES
RBC # BLD AUTO: 4.46 X10*6/UL (ref 4.5–5.9)
RV RNA STL NAA+PROBE: NOT DETECTED
SALMONELLA DNA STL QL NAA+PROBE: NOT DETECTED
SHIGELLA DNA SPEC QL NAA+PROBE: NOT DETECTED
SODIUM SERPL-SCNC: 133 MMOL/L (ref 136–145)
T AXIS: 30 DEGREES
T OFFSET: 378 MS
V CHOLERAE DNA STL QL NAA+PROBE: NOT DETECTED
VENTRICULAR RATE: 124 BPM
WBC # BLD AUTO: 17.6 X10*3/UL (ref 4.4–11.3)
Y ENTEROCOL DNA STL QL NAA+PROBE: NOT DETECTED

## 2024-09-07 PROCEDURE — 85027 COMPLETE CBC AUTOMATED: CPT | Performed by: INTERNAL MEDICINE

## 2024-09-07 PROCEDURE — 80048 BASIC METABOLIC PNL TOTAL CA: CPT | Performed by: INTERNAL MEDICINE

## 2024-09-07 PROCEDURE — 2500000001 HC RX 250 WO HCPCS SELF ADMINISTERED DRUGS (ALT 637 FOR MEDICARE OP): Performed by: INTERNAL MEDICINE

## 2024-09-07 PROCEDURE — 2500000002 HC RX 250 W HCPCS SELF ADMINISTERED DRUGS (ALT 637 FOR MEDICARE OP, ALT 636 FOR OP/ED): Performed by: INTERNAL MEDICINE

## 2024-09-07 PROCEDURE — 2500000004 HC RX 250 GENERAL PHARMACY W/ HCPCS (ALT 636 FOR OP/ED): Performed by: INTERNAL MEDICINE

## 2024-09-07 PROCEDURE — 36415 COLL VENOUS BLD VENIPUNCTURE: CPT | Performed by: INTERNAL MEDICINE

## 2024-09-07 PROCEDURE — 99232 SBSQ HOSP IP/OBS MODERATE 35: CPT | Performed by: INTERNAL MEDICINE

## 2024-09-07 PROCEDURE — 83036 HEMOGLOBIN GLYCOSYLATED A1C: CPT | Mod: AHULAB | Performed by: INTERNAL MEDICINE

## 2024-09-07 PROCEDURE — 1100000001 HC PRIVATE ROOM DAILY

## 2024-09-07 RX ORDER — POTASSIUM CHLORIDE 20 MEQ/1
20 TABLET, EXTENDED RELEASE ORAL ONCE
Status: COMPLETED | OUTPATIENT
Start: 2024-09-07 | End: 2024-09-07

## 2024-09-07 ASSESSMENT — COGNITIVE AND FUNCTIONAL STATUS - GENERAL
STANDING UP FROM CHAIR USING ARMS: A LOT
TURNING FROM BACK TO SIDE WHILE IN FLAT BAD: A LITTLE
MOBILITY SCORE: 14
TOILETING: A LITTLE
HELP NEEDED FOR BATHING: A LOT
CLIMB 3 TO 5 STEPS WITH RAILING: A LOT
MOVING TO AND FROM BED TO CHAIR: A LOT
DRESSING REGULAR LOWER BODY CLOTHING: A LITTLE
WALKING IN HOSPITAL ROOM: A LOT
DRESSING REGULAR UPPER BODY CLOTHING: A LITTLE
DAILY ACTIVITIY SCORE: 19
MOVING FROM LYING ON BACK TO SITTING ON SIDE OF FLAT BED WITH BEDRAILS: A LITTLE

## 2024-09-07 ASSESSMENT — PAIN SCALES - GENERAL
PAINLEVEL_OUTOF10: 10 - WORST POSSIBLE PAIN
PAINLEVEL_OUTOF10: 6
PAINLEVEL_OUTOF10: 8
PAINLEVEL_OUTOF10: 9
PAINLEVEL_OUTOF10: 9
PAINLEVEL_OUTOF10: 10 - WORST POSSIBLE PAIN

## 2024-09-07 ASSESSMENT — PAIN SCALES - WONG BAKER
WONGBAKER_NUMERICALRESPONSE: HURTS LITTLE MORE
WONGBAKER_NUMERICALRESPONSE: HURTS WHOLE LOT

## 2024-09-07 ASSESSMENT — PAIN - FUNCTIONAL ASSESSMENT
PAIN_FUNCTIONAL_ASSESSMENT: 0-10

## 2024-09-07 ASSESSMENT — PAIN DESCRIPTION - ORIENTATION
ORIENTATION: LEFT
ORIENTATION: LEFT

## 2024-09-07 ASSESSMENT — PAIN DESCRIPTION - LOCATION
LOCATION: KNEE
LOCATION: OTHER (COMMENT)
LOCATION: KNEE

## 2024-09-07 NOTE — CONSULTS
INFECTIOUS DISEASE INPATIENT INITIAL CONSULTATION    Referred By: Lio Monroy    Reason For Consult: sepsis    HPI:  This is a 39 y.o. male with no major PMH who presented with body aches and stiffness.    He began to feel ill around 9/3 later in the day. Started with loose stools/diarrhea that has persisted since then. No blood in stool. No n/v or abd pain. He felt like his joints were stiff and very achy. Felt like something was seriously wrong and came here. No chest pain, shortness of breath, cough, n/v/c/d, abd pain, urinary issues. He does not recall any specific food exposures the day of getting sick. He was in [place], [place] over the last weekend. He says he ate at a [place] but other than that doesn't recall any different food intake that stands out.    Temp 102.5 here, tachycardic. No hypoxia. WBC 24K and improved to 17.6 now. On IV Vanc/Zosyn. Blood cx x2 with GNB showing. Acute hepatitis panel negative. Flu/COVID/RSV negative. GC/Chlam screen negative. AST/ALT have mild elevation with normal Tbili. Is on IV Zosyn.    Allergies:  Shellfish containing products     Vitals (Last 24 Hours):  Heart Rate:  []   Temp:  [36.9 °C (98.4 °F)-39.2 °C (102.5 °F)]   Resp:  [16-18]   BP: (132-150)/(70-94)   SpO2:  [93 %-95 %]      PHYSICAL EXAM:  Gen - NAD, in bed  Heart - RRR, no murmurs  Lungs - clear bilaterally, no wheezing  Abd - soft, no ttp, BS present  Skin - no rash    MEDS:    Current Facility-Administered Medications:     acetaminophen (Tylenol) tablet 650 mg, 650 mg, oral, q4h PRN, 650 mg at 09/06/24 1429 **OR** acetaminophen (Tylenol) oral liquid 650 mg, 650 mg, oral, q4h PRN, 650 mg at 09/07/24 0540 **OR** acetaminophen (Tylenol) suppository 650 mg, 650 mg, rectal, q4h PRN, Lio Monroy MD    alum-mag hydroxide-simeth (Mylanta) 200-200-20 mg/5 mL oral suspension 20 mL, 20 mL, oral, 4x daily PRN, Lio Monroy MD    amLODIPine (Norvasc) tablet 5 mg, 5 mg, oral, Daily, Lio Monroy MD, 5 mg at  09/06/24 0848    enoxaparin (Lovenox) syringe 40 mg, 40 mg, subcutaneous, q24h, Lio Monroy MD, 40 mg at 09/06/24 0848    guaiFENesin (Mucinex) 12 hr tablet 600 mg, 600 mg, oral, q12h PRN, Lio Monroy MD    HYDROmorphone (Dilaudid) injection 0.4 mg, 0.4 mg, intravenous, q3h PRN, Lio Monroy MD, 0.4 mg at 09/07/24 0057    lactated Ringer's infusion, 125 mL/hr, intravenous, Continuous, Chinmay Hardin MD, Stopped at 09/05/24 2308    melatonin tablet 3 mg, 3 mg, oral, Nightly PRN, Lio Monroy MD    ondansetron (Zofran) tablet 4 mg, 4 mg, oral, q8h PRN **OR** ondansetron (Zofran) injection 4 mg, 4 mg, intravenous, q8h PRN, Lio Monroy MD    pantoprazole (ProtoNix) EC tablet 40 mg, 40 mg, oral, Daily before breakfast, 40 mg at 09/07/24 0530 **OR** pantoprazole (ProtoNix) injection 40 mg, 40 mg, intravenous, Daily before breakfast, Lio Monroy MD    piperacillin-tazobactam (Zosyn) 3.375 g in dextrose (iso) IV 50 mL, 3.375 g, intravenous, q6h, Lio Monroy MD, Stopped at 09/07/24 0357    polyethylene glycol (Glycolax, Miralax) packet 17 g, 17 g, oral, Daily PRN, Lio Monroy MD    sodium chloride 0.9% infusion, 100 mL/hr, intravenous, Continuous, Lio Monroy MD, Last Rate: 100 mL/hr at 09/07/24 0224, 100 mL/hr at 09/07/24 0224    traMADol (Ultram) tablet 50 mg, 50 mg, oral, q6h PRN, Lio Monroy MD, 50 mg at 09/07/24 0327     LABS:  Lab Results   Component Value Date    WBC 17.6 (H) 09/07/2024    HGB 12.1 (L) 09/07/2024    HCT 36.4 (L) 09/07/2024    MCV 82 09/07/2024     09/07/2024      Results from last 72 hours   Lab Units 09/07/24  0509   SODIUM mmol/L 133*   POTASSIUM mmol/L 3.4*   CHLORIDE mmol/L 96*   CO2 mmol/L 27   BUN mg/dL 8   CREATININE mg/dL 0.84   GLUCOSE mg/dL 169*   CALCIUM mg/dL 8.5*   ANION GAP mmol/L 13   EGFR mL/min/1.73m*2 >90     Results from last 72 hours   Lab Units 09/05/24  1618   ALK PHOS U/L 79   BILIRUBIN TOTAL mg/dL 0.6   PROTEIN TOTAL g/dL 8.0   ALT U/L 73*    AST U/L 57*   ALBUMIN g/dL 3.9     Estimated Creatinine Clearance: 125 mL/min (by C-G formula based on SCr of 0.84 mg/dL).    C-Reactive Protein   Date/Time Value Ref Range Status   09/05/2024 04:18 PM 40.80 (H) <1.00 mg/dL Final     Sedimentation Rate   Date/Time Value Ref Range Status   09/05/2024 04:18  (H) 0 - 15 mm/h Final     IMAGING:  CT Head 9/5  Impression:     1. No acute intracranial abnormality identified.  2. Small chronic appearing defect along the left parietal calvarium  with overlying soft tissue density/scarring. Correlate for prior  procedure such as dallas hole at this site.     CT C/A/P 9/5  Impression:     1. Hepatomegaly and hepatic steatosis.  2. Soft tissue attenuating exophytic lesion arising from the  posterior aspect of the left kidney, incompletely characterized on  this exam. Recommend follow-up renal mass CT or MRI for further  assessment.       ASSESSMENT/PLAN:    Sepsis - improving with decreasing WBC  Gram Negative Bacteremia - source would seem to be related to GI illness/diarrhea. No particular food exposures stand out, maybe eating out at [employer]. Perhaps this is E. Coli.  Exophytic Left Kidney Lesion - MRI kidney pending    Will see what GNB speciates to. That may better illuminate the source. Will continue on IV Zosyn. WBC is coming down.    Monitoring for adverse effects of abx such as rash/itching.    Will follow. Thanks!    Yifan Sidhu MD  [facility]  Office #[phone]

## 2024-09-07 NOTE — CARE PLAN
The patient's goals for the shift include      The clinical goals for the shift include HDS through the night.    Patient had a temp > 100 once and body aches through out the night.  Been taking prn pain meds around the clock.

## 2024-09-07 NOTE — PROGRESS NOTES
09/07/24 0957   Discharge Planning   Home or Post Acute Services None   Expected Discharge Disposition Home   Does the patient need discharge transport arranged? No     PLAN/BARRIER: Waiting on MRI and ID consult  DISP: home  ADOD: 3 days  Patient may need homecare depending on ID plan and MRI results  Chelle Hope RN

## 2024-09-07 NOTE — PROGRESS NOTES
Gabe Varelaeliu Francis is a 39 y.o. male     Gram negative bacteremia discussed with  Patient    Review of Systems     Constitutional: Feeling poorly  Cardiovascular: no chest pain   Respiratory: no cough, wheezing or shortness of breath a  Gastrointestinal: no abdominal pain, no constipation, no melena, no nausea, no diarrhea, no vomiting and no blood in stools.   Neurological: no headache,   All other systems have been reviewed and are negative for complaint.       Vitals:    09/07/24 0819   BP:    Pulse:    Resp:    Temp: 37.1 °C (98.8 °F)   SpO2:         Scheduled medications  amLODIPine, 5 mg, oral, Daily  enoxaparin, 40 mg, subcutaneous, q24h  pantoprazole, 40 mg, oral, Daily before breakfast   Or  pantoprazole, 40 mg, intravenous, Daily before breakfast  piperacillin-tazobactam, 3.375 g, intravenous, q6h      Continuous medications  lactated Ringer's, 125 mL/hr, Last Rate: Stopped (09/05/24 2308)  sodium chloride 0.9%, 100 mL/hr, Last Rate: 100 mL/hr (09/07/24 0224)      PRN medications  PRN medications: acetaminophen **OR** acetaminophen **OR** acetaminophen, alum-mag hydroxide-simeth, guaiFENesin, HYDROmorphone, melatonin, ondansetron **OR** ondansetron, polyethylene glycol, traMADol    Lab Review   Results from last 7 days   Lab Units 09/07/24  0509 09/06/24  1006 09/05/24  1618   WBC AUTO x10*3/uL 17.6* 20.9* 24.7*   HEMOGLOBIN g/dL 12.1* 12.5* 15.1   HEMATOCRIT % 36.4* 37.0* 42.8   PLATELETS AUTO x10*3/uL 207 170 206     Results from last 7 days   Lab Units 09/07/24  0509 09/06/24  1006 09/05/24  1618   SODIUM mmol/L 133* 130* 134*   POTASSIUM mmol/L 3.4* 3.5 3.7   CHLORIDE mmol/L 96* 95* 95*   CO2 mmol/L 27 23 23   BUN mg/dL 8 17 29*   CREATININE mg/dL 0.84 1.09 2.66*   CALCIUM mg/dL 8.5* 7.5* 8.2*   PROTEIN TOTAL g/dL  --   --  8.0   BILIRUBIN TOTAL mg/dL  --   --  0.6   ALK PHOS U/L  --   --  79   ALT U/L  --   --  73*   AST U/L  --   --  57*   GLUCOSE mg/dL 169* 211* 192*     Results from last 7 days    Lab Units 09/05/24  1618   TROPHS ng/L 14        CT head wo IV contrast   Final Result   1. No acute intracranial abnormality identified.   2. Small chronic appearing defect along the left parietal calvarium   with overlying soft tissue density/scarring. Correlate for prior   procedure such as dallas hole at this site.             If there is concern for ongoing intracranial abnormality then MRI may   be performed for further follow-up.        MACRO:   None        Signed by: Sha Lombardo 9/5/2024 7:35 PM   Dictation workstation:   [id number]      CT chest abdomen pelvis wo IV contrast   Final Result   1. Hepatomegaly and hepatic steatosis.   2. Soft tissue attenuating exophytic lesion arising from the   posterior aspect of the left kidney, incompletely characterized on   this exam. Recommend follow-up renal mass CT or MRI for further   assessment.        MACRO:   None        Signed by: Sha Lombardo 9/5/2024 7:53 PM   Dictation workstation:   [id number]      XR chest 2 views   Final Result   No acute pathologic findings are identified on this exam.        MACRO:   none        Signed by: Gabe White 9/5/2024 4:56 PM   Dictation workstation:   [id number]      MR kidney wo IV contrast    (Results Pending)         Physical Exam    Constitutional   General appearance: Alert   Pulmonary   Respiratory assessment: No respiratory distress, normal respiratory rhythm and effort.    Auscultation of Lungs: Clear bilateral breath sounds.   Cardiovascular   Auscultation of heart: Apical pulse normal, heart rate and rhythm normal, normal S1 and S2, no murmurs and no pericardial rub.    Exam for edema: No peripheral edema.   Abdomen   Abdominal Exam: No bruits, normal bowel sounds, soft, non-tender, no abdominal mass palpated.    Liver and Spleen exam: No hepato-splenomegaly.   Musculoskeletal   Examination of gait: Normal.    Inspection of digits and nails: No clubbing or cyanosis of the fingernails.     Inspection/palpation of joints, bones and muscles: No joint swelling. Normal movement of all extremities.   Neurologic   Cranial nerves: Nerves 2-12 were intact, no focal neuro defects.         Assessment/Plan      #Gram-negative bacteremia  Unclear source  Noted kidney findings on CAT scan  MRI kidney pending  Continue broad-spectrum antibiotics  ID consulted     #Diarrhea  Check stool for PCR pending     #Generalized myalgias and arthralgias  Elevated CRP RP and ESR noted  Likely brought on by the sepsis     #Hypertension  #Acute kidney injury  Kidney functions improved with fluids

## 2024-09-08 ENCOUNTER — APPOINTMENT (OUTPATIENT)
Dept: RADIOLOGY | Facility: HOSPITAL | Age: 39
DRG: 854 | End: 2024-09-08
Payer: COMMERCIAL

## 2024-09-08 VITALS
HEART RATE: 95 BPM | SYSTOLIC BLOOD PRESSURE: 153 MMHG | OXYGEN SATURATION: 94 % | TEMPERATURE: 98.2 F | WEIGHT: 210 LBS | HEIGHT: 71 IN | BODY MASS INDEX: 29.4 KG/M2 | RESPIRATION RATE: 18 BRPM | DIASTOLIC BLOOD PRESSURE: 76 MMHG

## 2024-09-08 LAB
ANION GAP SERPL CALC-SCNC: 14 MMOL/L (ref 10–20)
B-LACTAMASE ORGANISM ISLT: NEGATIVE
BACTERIA BLD AEROBE CULT: ABNORMAL
BACTERIA BLD AEROBE CULT: ABNORMAL
BACTERIA BLD CULT: ABNORMAL
BACTERIA BLD CULT: ABNORMAL
BODY SURFACE AREA: 2.18 M2
BUN SERPL-MCNC: 8 MG/DL (ref 6–23)
CALCIUM SERPL-MCNC: 8.7 MG/DL (ref 8.6–10.3)
CHLORIDE SERPL-SCNC: 98 MMOL/L (ref 98–107)
CO2 SERPL-SCNC: 25 MMOL/L (ref 21–32)
CREAT SERPL-MCNC: 0.74 MG/DL (ref 0.5–1.3)
EGFRCR SERPLBLD CKD-EPI 2021: >90 ML/MIN/1.73M*2
ERYTHROCYTE [DISTWIDTH] IN BLOOD BY AUTOMATED COUNT: 12.4 % (ref 11.5–14.5)
GLUCOSE BLD MANUAL STRIP-MCNC: 175 MG/DL (ref 74–99)
GLUCOSE BLD MANUAL STRIP-MCNC: 197 MG/DL (ref 74–99)
GLUCOSE BLD MANUAL STRIP-MCNC: 202 MG/DL (ref 74–99)
GLUCOSE SERPL-MCNC: 161 MG/DL (ref 74–99)
GRAM STN SPEC: ABNORMAL
HCT VFR BLD AUTO: 36 % (ref 41–52)
HGB BLD-MCNC: 12.2 G/DL (ref 13.5–17.5)
MCH RBC QN AUTO: 27.5 PG (ref 26–34)
MCHC RBC AUTO-ENTMCNC: 33.9 G/DL (ref 32–36)
MCV RBC AUTO: 81 FL (ref 80–100)
NRBC BLD-RTO: 0 /100 WBCS (ref 0–0)
PLATELET # BLD AUTO: 228 X10*3/UL (ref 150–450)
POTASSIUM SERPL-SCNC: 3.3 MMOL/L (ref 3.5–5.3)
RBC # BLD AUTO: 4.44 X10*6/UL (ref 4.5–5.9)
SODIUM SERPL-SCNC: 134 MMOL/L (ref 136–145)
WBC # BLD AUTO: 15.7 X10*3/UL (ref 4.4–11.3)

## 2024-09-08 PROCEDURE — 82947 ASSAY GLUCOSE BLOOD QUANT: CPT

## 2024-09-08 PROCEDURE — 99232 SBSQ HOSP IP/OBS MODERATE 35: CPT | Performed by: INTERNAL MEDICINE

## 2024-09-08 PROCEDURE — 85027 COMPLETE CBC AUTOMATED: CPT | Performed by: INTERNAL MEDICINE

## 2024-09-08 PROCEDURE — 2500000001 HC RX 250 WO HCPCS SELF ADMINISTERED DRUGS (ALT 637 FOR MEDICARE OP): Performed by: INTERNAL MEDICINE

## 2024-09-08 PROCEDURE — 2500000004 HC RX 250 GENERAL PHARMACY W/ HCPCS (ALT 636 FOR OP/ED): Performed by: INTERNAL MEDICINE

## 2024-09-08 PROCEDURE — 74181 MRI ABDOMEN W/O CONTRAST: CPT

## 2024-09-08 PROCEDURE — 80048 BASIC METABOLIC PNL TOTAL CA: CPT | Performed by: INTERNAL MEDICINE

## 2024-09-08 PROCEDURE — 74181 MRI ABDOMEN W/O CONTRAST: CPT | Performed by: STUDENT IN AN ORGANIZED HEALTH CARE EDUCATION/TRAINING PROGRAM

## 2024-09-08 PROCEDURE — 2500000002 HC RX 250 W HCPCS SELF ADMINISTERED DRUGS (ALT 637 FOR MEDICARE OP, ALT 636 FOR OP/ED): Performed by: INTERNAL MEDICINE

## 2024-09-08 PROCEDURE — 2500000004 HC RX 250 GENERAL PHARMACY W/ HCPCS (ALT 636 FOR OP/ED): Performed by: EMERGENCY MEDICINE

## 2024-09-08 PROCEDURE — 1100000001 HC PRIVATE ROOM DAILY

## 2024-09-08 PROCEDURE — 93971 EXTREMITY STUDY: CPT | Performed by: RADIOLOGY

## 2024-09-08 PROCEDURE — 36415 COLL VENOUS BLD VENIPUNCTURE: CPT | Performed by: INTERNAL MEDICINE

## 2024-09-08 PROCEDURE — 93970 EXTREMITY STUDY: CPT

## 2024-09-08 RX ORDER — POTASSIUM CHLORIDE 20 MEQ/1
20 TABLET, EXTENDED RELEASE ORAL ONCE
Status: COMPLETED | OUTPATIENT
Start: 2024-09-08 | End: 2024-09-08

## 2024-09-08 RX ORDER — KETOROLAC TROMETHAMINE 30 MG/ML
30 INJECTION, SOLUTION INTRAMUSCULAR; INTRAVENOUS EVERY 6 HOURS PRN
Status: DISPENSED | OUTPATIENT
Start: 2024-09-08 | End: 2024-09-13

## 2024-09-08 RX ORDER — INSULIN LISPRO 100 [IU]/ML
0-5 INJECTION, SOLUTION INTRAVENOUS; SUBCUTANEOUS
Status: DISPENSED | OUTPATIENT
Start: 2024-09-08

## 2024-09-08 ASSESSMENT — COGNITIVE AND FUNCTIONAL STATUS - GENERAL
TURNING FROM BACK TO SIDE WHILE IN FLAT BAD: A LITTLE
HELP NEEDED FOR BATHING: A LOT
STANDING UP FROM CHAIR USING ARMS: A LOT
DRESSING REGULAR UPPER BODY CLOTHING: A LITTLE
MOBILITY SCORE: 14
DRESSING REGULAR LOWER BODY CLOTHING: A LITTLE
CLIMB 3 TO 5 STEPS WITH RAILING: A LOT
TOILETING: A LITTLE
MOVING FROM LYING ON BACK TO SITTING ON SIDE OF FLAT BED WITH BEDRAILS: A LITTLE
STANDING UP FROM CHAIR USING ARMS: A LOT
CLIMB 3 TO 5 STEPS WITH RAILING: A LOT
MOVING TO AND FROM BED TO CHAIR: A LOT
MOBILITY SCORE: 14
WALKING IN HOSPITAL ROOM: A LOT
MOVING FROM LYING ON BACK TO SITTING ON SIDE OF FLAT BED WITH BEDRAILS: A LITTLE
HELP NEEDED FOR BATHING: A LOT
TURNING FROM BACK TO SIDE WHILE IN FLAT BAD: A LITTLE
DRESSING REGULAR UPPER BODY CLOTHING: A LITTLE
MOVING TO AND FROM BED TO CHAIR: A LOT
DAILY ACTIVITIY SCORE: 19
TOILETING: A LITTLE
DRESSING REGULAR LOWER BODY CLOTHING: A LITTLE
DAILY ACTIVITIY SCORE: 19
WALKING IN HOSPITAL ROOM: A LOT

## 2024-09-08 ASSESSMENT — PAIN SCALES - GENERAL
PAINLEVEL_OUTOF10: 9
PAINLEVEL_OUTOF10: 10 - WORST POSSIBLE PAIN
PAINLEVEL_OUTOF10: 10 - WORST POSSIBLE PAIN
PAINLEVEL_OUTOF10: 7
PAINLEVEL_OUTOF10: 8
PAINLEVEL_OUTOF10: 4
PAINLEVEL_OUTOF10: 10 - WORST POSSIBLE PAIN
PAINLEVEL_OUTOF10: 10 - WORST POSSIBLE PAIN
PAINLEVEL_OUTOF10: 8

## 2024-09-08 ASSESSMENT — PAIN - FUNCTIONAL ASSESSMENT
PAIN_FUNCTIONAL_ASSESSMENT: 0-10

## 2024-09-08 ASSESSMENT — PAIN SCALES - PAIN ASSESSMENT IN ADVANCED DEMENTIA (PAINAD)
TOTALSCORE: MEDICATION (SEE MAR);COLD PACK

## 2024-09-08 ASSESSMENT — PAIN DESCRIPTION - LOCATION
LOCATION: ELBOW
LOCATION: ELBOW
LOCATION: LEG
LOCATION: ELBOW
LOCATION: LEG

## 2024-09-08 ASSESSMENT — PAIN DESCRIPTION - ORIENTATION
ORIENTATION: RIGHT
ORIENTATION: RIGHT
ORIENTATION: LEFT
ORIENTATION: LEFT
ORIENTATION: RIGHT

## 2024-09-08 ASSESSMENT — PAIN SCALES - WONG BAKER
WONGBAKER_NUMERICALRESPONSE: HURTS WORST
WONGBAKER_NUMERICALRESPONSE: HURTS WORST
WONGBAKER_NUMERICALRESPONSE: HURTS WHOLE LOT

## 2024-09-08 NOTE — PROGRESS NOTES
Gabe Linder  is a 39 y.o. male     Patient having pain and weakness in the left lower extremity  Discussed patient's A1c of 9.3  He has not seen a physician in a while  Likely has been diabetic for a while to    Review of Systems     Constitutional: Feeling poorly  Cardiovascular: no chest pain   Respiratory: no cough, wheezing or shortness of breath a  Gastrointestinal: no abdominal pain, no constipation, no melena, no nausea, no diarrhea, no vomiting and no blood in stools.   Neurological: no headache,   All other systems have been reviewed and are negative for complaint.       Vitals:    09/08/24 0900   BP: (!) 149/98   Pulse: 98   Resp: 18   Temp: 36.8 °C (98.2 °F)   SpO2: 94%        Scheduled medications  amLODIPine, 5 mg, oral, Daily  enoxaparin, 40 mg, subcutaneous, q24h  insulin lispro, 0-5 Units, subcutaneous, Before meals & nightly  pantoprazole, 40 mg, oral, Daily before breakfast   Or  pantoprazole, 40 mg, intravenous, Daily before breakfast  piperacillin-tazobactam, 3.375 g, intravenous, q6h      Continuous medications  lactated Ringer's, 125 mL/hr, Last Rate: 125 mL/hr (09/08/24 1329)  sodium chloride 0.9%, 100 mL/hr, Last Rate: 100 mL/hr (09/08/24 1329)      PRN medications  PRN medications: acetaminophen **OR** acetaminophen **OR** acetaminophen, alum-mag hydroxide-simeth, guaiFENesin, HYDROmorphone, melatonin, ondansetron **OR** ondansetron, polyethylene glycol, traMADol    Lab Review   Results from last 7 days   Lab Units 09/08/24  0607 09/07/24  0509 09/06/24  1006   WBC AUTO x10*3/uL 15.7* 17.6* 20.9*   HEMOGLOBIN g/dL 12.2* 12.1* 12.5*   HEMATOCRIT % 36.0* 36.4* 37.0*   PLATELETS AUTO x10*3/uL 228 207 170     Results from last 7 days   Lab Units 09/08/24  0607 09/07/24  0509 09/06/24  1006 09/05/24  1618   SODIUM mmol/L 134* 133* 130* 134*   POTASSIUM mmol/L 3.3* 3.4* 3.5 3.7   CHLORIDE mmol/L 98 96* 95* 95*   CO2 mmol/L 25 27 23 23   BUN mg/dL 8 8 17 29*   CREATININE mg/dL 0.74 0.84  1.09 2.66*   CALCIUM mg/dL 8.7 8.5* 7.5* 8.2*   PROTEIN TOTAL g/dL  --   --   --  8.0   BILIRUBIN TOTAL mg/dL  --   --   --  0.6   ALK PHOS U/L  --   --   --  79   ALT U/L  --   --   --  73*   AST U/L  --   --   --  57*   GLUCOSE mg/dL 161* 169* 211* 192*     Results from last 7 days   Lab Units 09/05/24  1618   TROPHS ng/L 14        CT head wo IV contrast   Final Result   1. No acute intracranial abnormality identified.   2. Small chronic appearing defect along the left parietal calvarium   with overlying soft tissue density/scarring. Correlate for prior   procedure such as dallas hole at this site.             If there is concern for ongoing intracranial abnormality then MRI may   be performed for further follow-up.        MACRO:   None        Signed by: Sha Lombardo 9/5/2024 7:35 PM   Dictation workstation:   [id number]      CT chest abdomen pelvis wo IV contrast   Final Result   1. Hepatomegaly and hepatic steatosis.   2. Soft tissue attenuating exophytic lesion arising from the   posterior aspect of the left kidney, incompletely characterized on   this exam. Recommend follow-up renal mass CT or MRI for further   assessment.        MACRO:   None        Signed by: Sha Lombardo 9/5/2024 7:53 PM   Dictation workstation:   [id number]      XR chest 2 views   Final Result   No acute pathologic findings are identified on this exam.        MACRO:   none        Signed by: Gabe White 9/5/2024 4:56 PM   Dictation workstation:   [id number]      MR kidney wo IV contrast    (Results Pending)   CT thoracic spine w IV contrast    (Results Pending)   CT lumbar spine w IV contrast    (Results Pending)         Physical Exam    Constitutional   General appearance: Alert   Pulmonary   Respiratory assessment: No respiratory distress, normal respiratory rhythm and effort.    Auscultation of Lungs: Clear bilateral breath sounds.   Cardiovascular   Auscultation of heart: Apical pulse normal, heart rate and rhythm normal, normal  S1 and S2, no murmurs and no pericardial rub.    Exam for edema: No peripheral edema.   Abdomen   Abdominal Exam: No bruits, normal bowel sounds, soft, non-tender, no abdominal mass palpated.    Liver and Spleen exam: No hepato-splenomegaly.   Musculoskeletal   Significant tenderness in the left lower extremity with some swelling  Neurologic   Cranial nerves: Nerves 2-12 were intact, no focal neuro defects.         Assessment/Plan      #Gram-negative bacteremia  Unclear source  Noted kidney findings on CAT scan  MRI kidney pending  Continue broad-spectrum antibiotics  Infectious disease has ordered CT L-spine and T-spine because of the left lower extremity weakness  We will also check an ultrasound of the lower extremity  Continue DVT prophylaxis    #New onset diabetes  Sliding-scale insulin coverage for now     #Diarrhea  Check stool for PCR pending     #Generalized myalgias and arthralgias  Elevated CRP RP and ESR noted  Likely brought on by the sepsis     #Hypertension  #Acute kidney injury  Kidney functions improved with fluids

## 2024-09-08 NOTE — CARE PLAN
The patient's goals for the shift include      The clinical goals for the shift include patient will remain HDS.    Over the shift, the patient did not make progress toward the following goals.

## 2024-09-08 NOTE — PROGRESS NOTES
"  INFECTIOUS DISEASE DAILY PROGRESS NOTE    SUBJECTIVE:    No overnight events. Afebrile. Body aches are improving. Main complaint now is difficulty raising the LLE. Has some pain/stiffness in the lower back as well. WBC is coming down. No respiratory symptoms like cough, sore throat, rhinorrhea, sinus pain, ear pain.    OBJECTIVE:  VITALS (Last 24 Hours)  BP (!) 149/98   Pulse 98   Temp 36.8 °C (98.2 °F) (Oral)   Resp 18   Ht 1.803 m (5' 11\")   Wt 95.3 kg (210 lb)   SpO2 94%   BMI 29.29 kg/m²     PHYSICAL EXAM:  Gen - NAD, in bed  Heart - RRR, no murmurs  Lungs - clear bilaterally, no wheezing  Abd - soft, no ttp, BS present  Skin - no rash    ABX: IV Zosyn    LABS:  Lab Results   Component Value Date    WBC 15.7 (H) 09/08/2024    HGB 12.2 (L) 09/08/2024    HCT 36.0 (L) 09/08/2024    MCV 81 09/08/2024     09/08/2024     Lab Results   Component Value Date    GLUCOSE 161 (H) 09/08/2024    CALCIUM 8.7 09/08/2024     (L) 09/08/2024    K 3.3 (L) 09/08/2024    CO2 25 09/08/2024    CL 98 09/08/2024    BUN 8 09/08/2024    CREATININE 0.74 09/08/2024     Results from last 72 hours   Lab Units 09/05/24  1618   ALK PHOS U/L 79   BILIRUBIN TOTAL mg/dL 0.6   PROTEIN TOTAL g/dL 8.0   ALT U/L 73*   AST U/L 57*   ALBUMIN g/dL 3.9     Estimated Creatinine Clearance: 125 mL/min (by C-G formula based on SCr of 0.74 mg/dL).    ASSESSMENT/PLAN:     Sepsis - improving with decreasing WBC and no fever now  H. Flu Bacteremia - not a foodborne illness. Typically from URI type infections but no symptoms like this apparent. Can cause metastatic infections like abscess. Back pain and inability to raise the LLE concerning.  Exophytic Left Kidney Lesion - MRI kidney pending     IV Zosyn still. Awaiting sensis to de-escalate abx.    Will check CT T/L spine with IV contrast.     Monitoring for adverse effects of abx such as rash/itching.     Will follow. Thanks!    Yifan Sidhu MD  [facility]  Office " #[phone]

## 2024-09-08 NOTE — CARE PLAN
The patient's goals for the shift include      The clinical goals for the shift include HDS through the night.      Problem: Fall/Injury  Goal: Not fall by end of shift  Outcome: Progressing  Goal: Be free from injury by end of the shift  Outcome: Progressing  Goal: Verbalize understanding of personal risk factors for fall in the hospital  Outcome: Progressing  Goal: Verbalize understanding of risk factor reduction measures to prevent injury from fall in the home  Outcome: Progressing  Goal: Use assistive devices by end of the shift  Outcome: Progressing  Goal: Pace activities to prevent fatigue by end of the shift  Outcome: Progressing

## 2024-09-09 ENCOUNTER — APPOINTMENT (OUTPATIENT)
Dept: RADIOLOGY | Facility: HOSPITAL | Age: 39
DRG: 854 | End: 2024-09-09
Payer: COMMERCIAL

## 2024-09-09 PROBLEM — M00.869: Status: ACTIVE | Noted: 2024-09-05

## 2024-09-09 LAB
ANA SER QL HEP2 SUBST: NEGATIVE
ANION GAP SERPL CALC-SCNC: 12 MMOL/L (ref 10–20)
B BURGDOR DNA SPEC QL NAA+PROBE: NOT DETECTED
BASOPHILS NFR FLD MANUAL: 0 %
BLASTS NFR FLD MANUAL: 0 %
BUN SERPL-MCNC: 11 MG/DL (ref 6–23)
CALCIUM SERPL-MCNC: 8.8 MG/DL (ref 8.6–10.3)
CHLORIDE SERPL-SCNC: 96 MMOL/L (ref 98–107)
CLARITY FLD: ABNORMAL
CO2 SERPL-SCNC: 30 MMOL/L (ref 21–32)
COLOR FLD: ABNORMAL
CREAT SERPL-MCNC: 0.79 MG/DL (ref 0.5–1.3)
CRP SERPL-MCNC: 18.33 MG/DL
EGFRCR SERPLBLD CKD-EPI 2021: >90 ML/MIN/1.73M*2
EOSINOPHIL NFR FLD MANUAL: 0 %
ERYTHROCYTE [DISTWIDTH] IN BLOOD BY AUTOMATED COUNT: 12.4 % (ref 11.5–14.5)
ERYTHROCYTE [SEDIMENTATION RATE] IN BLOOD BY WESTERGREN METHOD: >130 MM/H (ref 0–15)
GLUCOSE BLD MANUAL STRIP-MCNC: 177 MG/DL (ref 74–99)
GLUCOSE BLD MANUAL STRIP-MCNC: 187 MG/DL (ref 74–99)
GLUCOSE SERPL-MCNC: 134 MG/DL (ref 74–99)
HCT VFR BLD AUTO: 35.4 % (ref 41–52)
HGB BLD-MCNC: 12 G/DL (ref 13.5–17.5)
IMMATURE GRANULOCYTES IN FLUID: 0 %
LYMPHOCYTES NFR FLD MANUAL: 4 %
MAGNESIUM SERPL-MCNC: 1.9 MG/DL (ref 1.6–2.4)
MCH RBC QN AUTO: 27.1 PG (ref 26–34)
MCHC RBC AUTO-ENTMCNC: 33.9 G/DL (ref 32–36)
MCV RBC AUTO: 80 FL (ref 80–100)
MONOS+MACROS NFR FLD MANUAL: 5 %
NEUTROPHILS NFR FLD MANUAL: 91 %
NRBC BLD-RTO: 0 /100 WBCS (ref 0–0)
OTHER CELLS NFR FLD MANUAL: 0 %
PLASMA CELLS NFR FLD MANUAL: 0 %
PLATELET # BLD AUTO: 283 X10*3/UL (ref 150–450)
POTASSIUM SERPL-SCNC: 3.4 MMOL/L (ref 3.5–5.3)
RBC # BLD AUTO: 4.43 X10*6/UL (ref 4.5–5.9)
RBC # FLD AUTO: 3000 /UL
SODIUM SERPL-SCNC: 135 MMOL/L (ref 136–145)
SPECIMEN SOURCE: NORMAL
TOTAL CELLS COUNTED FLD: 100
WBC # BLD AUTO: 10.5 X10*3/UL (ref 4.4–11.3)
WBC # FLD AUTO: ABNORMAL /UL

## 2024-09-09 PROCEDURE — 2500000004 HC RX 250 GENERAL PHARMACY W/ HCPCS (ALT 636 FOR OP/ED): Performed by: INTERNAL MEDICINE

## 2024-09-09 PROCEDURE — 2500000002 HC RX 250 W HCPCS SELF ADMINISTERED DRUGS (ALT 637 FOR MEDICARE OP, ALT 636 FOR OP/ED): Performed by: INTERNAL MEDICINE

## 2024-09-09 PROCEDURE — 36415 COLL VENOUS BLD VENIPUNCTURE: CPT | Performed by: INTERNAL MEDICINE

## 2024-09-09 PROCEDURE — 1100000001 HC PRIVATE ROOM DAILY

## 2024-09-09 PROCEDURE — 85027 COMPLETE CBC AUTOMATED: CPT | Performed by: INTERNAL MEDICINE

## 2024-09-09 PROCEDURE — 73562 X-RAY EXAM OF KNEE 3: CPT | Mod: LEFT SIDE | Performed by: RADIOLOGY

## 2024-09-09 PROCEDURE — 82374 ASSAY BLOOD CARBON DIOXIDE: CPT | Performed by: INTERNAL MEDICINE

## 2024-09-09 PROCEDURE — 72129 CT CHEST SPINE W/DYE: CPT | Performed by: RADIOLOGY

## 2024-09-09 PROCEDURE — 93010 ELECTROCARDIOGRAM REPORT: CPT | Performed by: INTERNAL MEDICINE

## 2024-09-09 PROCEDURE — 99232 SBSQ HOSP IP/OBS MODERATE 35: CPT | Performed by: INTERNAL MEDICINE

## 2024-09-09 PROCEDURE — 2500000001 HC RX 250 WO HCPCS SELF ADMINISTERED DRUGS (ALT 637 FOR MEDICARE OP): Performed by: INTERNAL MEDICINE

## 2024-09-09 PROCEDURE — 82947 ASSAY GLUCOSE BLOOD QUANT: CPT

## 2024-09-09 PROCEDURE — 89060 EXAM SYNOVIAL FLUID CRYSTALS: CPT | Mod: AHULAB | Performed by: STUDENT IN AN ORGANIZED HEALTH CARE EDUCATION/TRAINING PROGRAM

## 2024-09-09 PROCEDURE — 86140 C-REACTIVE PROTEIN: CPT | Performed by: STUDENT IN AN ORGANIZED HEALTH CARE EDUCATION/TRAINING PROGRAM

## 2024-09-09 PROCEDURE — 72129 CT CHEST SPINE W/DYE: CPT

## 2024-09-09 PROCEDURE — 89051 BODY FLUID CELL COUNT: CPT | Performed by: STUDENT IN AN ORGANIZED HEALTH CARE EDUCATION/TRAINING PROGRAM

## 2024-09-09 PROCEDURE — 83735 ASSAY OF MAGNESIUM: CPT | Performed by: NURSE PRACTITIONER

## 2024-09-09 PROCEDURE — 73562 X-RAY EXAM OF KNEE 3: CPT | Mod: LT

## 2024-09-09 PROCEDURE — 87070 CULTURE OTHR SPECIMN AEROBIC: CPT | Mod: AHULAB | Performed by: STUDENT IN AN ORGANIZED HEALTH CARE EDUCATION/TRAINING PROGRAM

## 2024-09-09 PROCEDURE — 71045 X-RAY EXAM CHEST 1 VIEW: CPT

## 2024-09-09 PROCEDURE — 85652 RBC SED RATE AUTOMATED: CPT | Performed by: STUDENT IN AN ORGANIZED HEALTH CARE EDUCATION/TRAINING PROGRAM

## 2024-09-09 PROCEDURE — 72132 CT LUMBAR SPINE W/DYE: CPT

## 2024-09-09 PROCEDURE — 2550000001 HC RX 255 CONTRASTS: Performed by: INTERNAL MEDICINE

## 2024-09-09 PROCEDURE — 93005 ELECTROCARDIOGRAM TRACING: CPT

## 2024-09-09 RX ORDER — DEXTROSE 4 G
TABLET,CHEWABLE ORAL
Qty: 1 EACH | Refills: 0 | Status: SHIPPED | OUTPATIENT
Start: 2024-09-09

## 2024-09-09 RX ORDER — POTASSIUM CHLORIDE 20 MEQ/1
20 TABLET, EXTENDED RELEASE ORAL ONCE
Status: COMPLETED | OUTPATIENT
Start: 2024-09-09 | End: 2024-09-09

## 2024-09-09 RX ORDER — ISOPROPYL ALCOHOL 70 ML/100ML
SWAB TOPICAL
Qty: 100 EACH | Refills: 0 | Status: SHIPPED | OUTPATIENT
Start: 2024-09-09

## 2024-09-09 RX ORDER — LANCETS 33 GAUGE
EACH MISCELLANEOUS
Qty: 100 EACH | Refills: 0 | Status: SHIPPED | OUTPATIENT
Start: 2024-09-09

## 2024-09-09 ASSESSMENT — COGNITIVE AND FUNCTIONAL STATUS - GENERAL
CLIMB 3 TO 5 STEPS WITH RAILING: A LOT
DRESSING REGULAR UPPER BODY CLOTHING: A LITTLE
STANDING UP FROM CHAIR USING ARMS: A LITTLE
MOBILITY SCORE: 20
DAILY ACTIVITIY SCORE: 23
WALKING IN HOSPITAL ROOM: A LITTLE

## 2024-09-09 ASSESSMENT — PAIN DESCRIPTION - LOCATION
LOCATION: LEG
LOCATION: LEG

## 2024-09-09 ASSESSMENT — PAIN DESCRIPTION - ORIENTATION
ORIENTATION: LEFT
ORIENTATION: LEFT

## 2024-09-09 ASSESSMENT — PAIN SCALES - GENERAL
PAINLEVEL_OUTOF10: 5 - MODERATE PAIN
PAINLEVEL_OUTOF10: 8
PAINLEVEL_OUTOF10: 7
PAINLEVEL_OUTOF10: 7
PAINLEVEL_OUTOF10: 4

## 2024-09-09 ASSESSMENT — PAIN - FUNCTIONAL ASSESSMENT
PAIN_FUNCTIONAL_ASSESSMENT: 0-10

## 2024-09-09 ASSESSMENT — PAIN SCALES - PAIN ASSESSMENT IN ADVANCED DEMENTIA (PAINAD): TOTALSCORE: MEDICATION (SEE MAR)

## 2024-09-09 NOTE — CARE PLAN
The patient's goals for the shift include remain safe    The clinical goals for the shift include pt will have decreased right elbow pain levels during shift with pain management and cold packs

## 2024-09-09 NOTE — NURSING NOTE
Pt refused to be sent to radiology [facility], pt states he will go tomorrow morning; radiology notified and will reschedule time.

## 2024-09-09 NOTE — PROGRESS NOTES
09/09/24 3735   Current Planned Discharge Disposition   Current Planned Discharge Disposition Home     Pt cont IV atbs for sepsis. Plan to transition to po atbs on dc [facility]. Adod 1-2 days.

## 2024-09-09 NOTE — PROGRESS NOTES
Gabe Niyah Francis is a 39 y.o. male     Patient had increased pain in his joints last night  Started on Toradol  Discussed with infectious disease  They recommend orthopedic input regarding left knee pain    Review of Systems     Constitutional: Feeling poorly  Cardiovascular: no chest pain   Respiratory: no cough, wheezing or shortness of breath a  Gastrointestinal: no abdominal pain, no constipation, no melena, no nausea, no diarrhea, no vomiting and no blood in stools.   Neurological: no headache,   All other systems have been reviewed and are negative for complaint.       Vitals:    09/09/24 0837   BP: (!) 149/100   Pulse: 89   Resp: 16   Temp: 36.7 °C (98.1 °F)   SpO2: 97%        Scheduled medications  amLODIPine, 5 mg, oral, Daily  cefTRIAXone, 2 g, intravenous, q24h  enoxaparin, 40 mg, subcutaneous, q24h  insulin lispro, 0-5 Units, subcutaneous, Before meals & nightly  pantoprazole, 40 mg, oral, Daily before breakfast   Or  pantoprazole, 40 mg, intravenous, Daily before breakfast      Continuous medications  lactated Ringer's, 125 mL/hr, Last Rate: 125 mL/hr (09/08/24 1801)  sodium chloride 0.9%, 100 mL/hr, Last Rate: 100 mL/hr (09/08/24 1801)      PRN medications  PRN medications: acetaminophen **OR** acetaminophen **OR** acetaminophen, alum-mag hydroxide-simeth, guaiFENesin, HYDROmorphone, ketorolac, melatonin, ondansetron **OR** ondansetron, polyethylene glycol, traMADol    Lab Review   Results from last 7 days   Lab Units 09/09/24  0537 09/08/24  0607 09/07/24  0509   WBC AUTO x10*3/uL 10.5 15.7* 17.6*   HEMOGLOBIN g/dL 12.0* 12.2* 12.1*   HEMATOCRIT % 35.4* 36.0* 36.4*   PLATELETS AUTO x10*3/uL 283 228 207     Results from last 7 days   Lab Units 09/09/24  0536 09/08/24  0607 09/07/24  0509 09/06/24  1006 09/05/24  1618   SODIUM mmol/L 135* 134* 133*   < > 134*   POTASSIUM mmol/L 3.4* 3.3* 3.4*   < > 3.7   CHLORIDE mmol/L 96* 98 96*   < > 95*   CO2 mmol/L 30 25 27   < > 23   BUN mg/dL 11 8 8   < > 29*    CREATININE mg/dL 0.79 0.74 0.84   < > 2.66*   CALCIUM mg/dL 8.8 8.7 8.5*   < > 8.2*   PROTEIN TOTAL g/dL  --   --   --   --  8.0   BILIRUBIN TOTAL mg/dL  --   --   --   --  0.6   ALK PHOS U/L  --   --   --   --  79   ALT U/L  --   --   --   --  73*   AST U/L  --   --   --   --  57*   GLUCOSE mg/dL 134* 161* 169*   < > 192*    < > = values in this interval not displayed.     Results from last 7 days   Lab Units 09/05/24  1618   TROPHS ng/L 14        CT thoracic spine w IV contrast   Final Result   No bony abnormality or soft tissue inflammation to suggest   osteomyelitis or discitis are noted.        No stenoses of the thoracic spine are noted.        MACRO:   None        Signed by: Regino Kurtz 9/9/2024 10:14 AM   Dictation workstation:   [id number]      CT lumbar spine w IV contrast   Final Result   Addendum (preliminary) 1 of 1   Interpreted By:  Regino Kurtz,    ADDENDUM:   Enhanced images of the lumbar spine were obtained with coronal and   sagittal reconstructions using 75 mL Omnipaque 350.        The paraspinous and spinal canal soft tissues enhance normally with   no abnormality suggesting infection noted.        Signed by: Regino Kurtz 9/9/2024 10:15 AM        -------- ORIGINAL REPORT --------   Dictation workstation:   [id number]      Final   No bony erosions or inflammatory soft tissue changes suggestive of   osteomyelitis/discitis in the lumbar spine are noted.        MACRO:   None        Signed by: Regino Kurtz 9/9/2024 10:11 AM   Dictation workstation:   [id number]      MR kidney wo IV contrast   Final Result   The questioned left renal lesion (2 cm) likely corresponds to a   hemorrhagic/proteinaceous cyst within limits of noncontrast enhanced   examination.        Hepatic steatosis. Hepatosplenomegaly.        MACRO   None        Signed by: Breana Pimentel 9/9/2024 8:11 AM   Dictation workstation:   [id number]      Lower extremity venous duplex bilateral   Final Result   No sonographic evidence  for deep vein thrombosis within the evaluated   veins of the bilateral lower extremities.        MACRO:   None        Signed by: Jeffery Mendez 9/9/2024 5:52 AM   Dictation workstation:   [id number]      CT head wo IV contrast   Final Result   1. No acute intracranial abnormality identified.   2. Small chronic appearing defect along the left parietal calvarium   with overlying soft tissue density/scarring. Correlate for prior   procedure such as dallas hole at this site.             If there is concern for ongoing intracranial abnormality then MRI may   be performed for further follow-up.        MACRO:   None        Signed by: Sha Lombardo 9/5/2024 7:35 PM   Dictation workstation:   [id number]      CT chest abdomen pelvis wo IV contrast   Final Result   1. Hepatomegaly and hepatic steatosis.   2. Soft tissue attenuating exophytic lesion arising from the   posterior aspect of the left kidney, incompletely characterized on   this exam. Recommend follow-up renal mass CT or MRI for further   assessment.        MACRO:   None        Signed by: Sha Lombardo 9/5/2024 7:53 PM   Dictation workstation:   [id number]      XR chest 2 views   Final Result   No acute pathologic findings are identified on this exam.        MACRO:   none        Signed by: Gabe White 9/5/2024 4:56 PM   Dictation workstation:   [id number]            Physical Exam    Constitutional   General appearance: Alert   Pulmonary   Respiratory assessment: No respiratory distress, normal respiratory rhythm and effort.    Auscultation of Lungs: Clear bilateral breath sounds.   Cardiovascular   Auscultation of heart: Apical pulse normal, heart rate and rhythm normal, normal S1 and S2, no murmurs and no pericardial rub.    Exam for edema: No peripheral edema.   Abdomen   Abdominal Exam: No bruits, normal bowel sounds, soft, non-tender, no abdominal mass palpated.    Liver and Spleen exam: No hepato-splenomegaly.   Musculoskeletal   Significant tenderness in  the left lower extremity with some swelling  Neurologic   Cranial nerves: Nerves 2-12 were intact, no focal neuro defects.         Assessment/Plan      #Gram-negative bacteremia  Unclear source  Continue antibiotics  Will get Ortho opinion regarding left knee      #New onset diabetes  Sliding-scale insulin coverage for now  Will go home on oral medications          #Generalized myalgias and arthralgias  Elevated CRP RP and ESR noted  Likely brought on by the sepsis     #Hypertension  Continue medicines

## 2024-09-09 NOTE — NURSING NOTE
Nurse notified MD of pt having c/o right elbow pain level 10/10. MD aware and provided pain medication prn.

## 2024-09-09 NOTE — CONSULTS
"Reason For Consult  Newly diagnosed DM; Elevated A1C, mild hyperglycemia    History Of Present Illness  Gabe Linder Jr. is a 39 y.o. male presenting with sepsis, generalized pain/weakness     Past Medical History  He has no past medical history on file.    Surgical History  He has no past surgical history on file.     Social History  He has no history on file for tobacco use, alcohol use, and drug use.    Family History  No family history on file.     Allergies  Shellfish containing products    Last Recorded Vitals  Blood pressure (!) 149/100, pulse 89, temperature 36.7 °C (98.1 °F), temperature source Temporal, resp. rate 16, height 1.803 m (5' 11\"), weight 95.3 kg (210 lb), SpO2 97%.    Relevant Results  A1C of 9.3 found on admission. Blood sugars improving     Assessment/Plan   Mr. Linder seen today for diabetes education consult. He is 39 year old male with no PMH of DM; strong family history (father was T1DM,  mother T2DM) as well as aunts (unsure if I vs II). Presented for generalized weakness, N/V, headache work up for sepsis. Additionally pt reports polydipsia and polyuria. He states he consume alcohol on a daily basis; upwards of a pint of vodka per day. Last drink was 7 days ago.      The following topics were discussed:    History and Diabetes   -Discussed brief pathophysiology of DM including roles of insulin & glucose    -T1 vs T2 and insulin resistance  -Review of diagnostic criteria  -long term effects, risks and co-morbidities    -importance of establishing podiatry, opthalmology and nephrology care  -need to monitor and check blood sugars   -pt is agreeable to wearing CGM   -will pursue prior to discharge    Insulin/Medications  -SSI as of now;  plan is to discharge on PO meds for DM   -Brief discussion of oral meds  -Discussed onset, peak and duration of insulins    -S/S and management of hypoglycemic event    Diet/Exercise  -Reviewed 50/25/25 plate method   -discussed appropriate dietary " choices, modifications and subsitutions  -pt states does not eat a lot of carbs normally,  mostly meat and veggies  -consumes large quantities of ice-cream, pop and juice  -pt ready to implement dietary/lifestyle changes  -ETOH cessation  -lifts weights at gym 3-4 days/week    Materials given  Diabetes Management Guide Handbook     Will continue to follow patient until discharge.    Kwabena Shah, RN  Time spent 60 mintues

## 2024-09-09 NOTE — PROGRESS NOTES
"  INFECTIOUS DISEASE DAILY PROGRESS NOTE    SUBJECTIVE:    Afebrile. WBC is normalized now at 10.5. Seems to be having left knee pain today.    OBJECTIVE:  VITALS (Last 24 Hours)  /87 (BP Location: Right arm, Patient Position: Lying)   Pulse 84   Temp 36.7 °C (98.1 °F) (Oral)   Resp 18   Ht 1.803 m (5' 11\")   Wt 95.3 kg (210 lb)   SpO2 96%   BMI 29.29 kg/m²     PHYSICAL EXAM:  Gen - NAD, in bed  Lungs - clear bilaterally, no wh  Abd - soft, no ttp, BS present  MSK - left knee with some warmth, doesn't seem really swollen. Pain with active ROM left knee. Left hip motion intact without pain.  Skin - no rash    ABX: IV Zosyn    LABS:  Lab Results   Component Value Date    WBC 10.5 09/09/2024    HGB 12.0 (L) 09/09/2024    HCT 35.4 (L) 09/09/2024    MCV 80 09/09/2024     09/09/2024     Lab Results   Component Value Date    GLUCOSE 134 (H) 09/09/2024    CALCIUM 8.8 09/09/2024     (L) 09/09/2024    K 3.4 (L) 09/09/2024    CO2 30 09/09/2024    CL 96 (L) 09/09/2024    BUN 11 09/09/2024    CREATININE 0.79 09/09/2024           Estimated Creatinine Clearance: 125 mL/min (by C-G formula based on SCr of 0.79 mg/dL).    IMAGING:  CT T/L Spine 9/8  IMPRESSION:  No bony abnormality or soft tissue inflammation to suggest  osteomyelitis or discitis are noted.    No stenoses of the thoracic spine are noted.    MRI Kidney 9/8  IMPRESSION:  The questioned left renal lesion (2 cm) likely corresponds to a  hemorrhagic/proteinaceous cyst within limits of noncontrast enhanced  examination.    Hepatic steatosis. Hepatosplenomegaly.    ASSESSMENT/PLAN:     Sepsis - improving with decreasing WBC and no fever now  H. Flu Bacteremia - not a foodborne illness. Typically from URI type infections but no symptoms like this apparent. Can cause metastatic infections like abscess. Back pain and inability to raise the LLE concerning. CT without spine infection.   Exophytic Left Kidney Lesion - looks like a hemorrhagic or " proteinaceous cyst on MRI     Changing to IV CTX 2g Q24H.    Today LLE weakness pain seems to be more localized to the left knee. Will see if ortho can take a look at the left knee.     Monitoring for adverse effects of abx such as rash/itching.     Will follow. Thanks! D/w Dr. Porfirio Sidhu MD  [facility]  Office #[phone]

## 2024-09-10 ENCOUNTER — ANESTHESIA (OUTPATIENT)
Dept: OPERATING ROOM | Facility: HOSPITAL | Age: 39
End: 2024-09-10
Payer: COMMERCIAL

## 2024-09-10 ENCOUNTER — APPOINTMENT (OUTPATIENT)
Dept: CARDIOLOGY | Facility: HOSPITAL | Age: 39
DRG: 854 | End: 2024-09-10
Payer: COMMERCIAL

## 2024-09-10 ENCOUNTER — ANESTHESIA EVENT (OUTPATIENT)
Dept: OPERATING ROOM | Facility: HOSPITAL | Age: 39
End: 2024-09-10
Payer: COMMERCIAL

## 2024-09-10 LAB
ABO GROUP (TYPE) IN BLOOD: NORMAL
ANION GAP SERPL CALC-SCNC: 15 MMOL/L (ref 10–20)
ANTIBODY SCREEN: NORMAL
APTT PPP: 29 SECONDS (ref 27–38)
ATRIAL RATE: 96 BPM
BUN SERPL-MCNC: 9 MG/DL (ref 6–23)
CALCIUM SERPL-MCNC: 9.6 MG/DL (ref 8.6–10.3)
CHLORIDE SERPL-SCNC: 95 MMOL/L (ref 98–107)
CO2 SERPL-SCNC: 28 MMOL/L (ref 21–32)
CREAT SERPL-MCNC: 0.69 MG/DL (ref 0.5–1.3)
CRYSTALS FLD MICRO: NORMAL
EGFRCR SERPLBLD CKD-EPI 2021: >90 ML/MIN/1.73M*2
ERYTHROCYTE [DISTWIDTH] IN BLOOD BY AUTOMATED COUNT: 12.5 % (ref 11.5–14.5)
GLUCOSE BLD MANUAL STRIP-MCNC: 172 MG/DL (ref 74–99)
GLUCOSE BLD MANUAL STRIP-MCNC: 193 MG/DL (ref 74–99)
GLUCOSE BLD MANUAL STRIP-MCNC: 197 MG/DL (ref 74–99)
GLUCOSE BLD MANUAL STRIP-MCNC: 314 MG/DL (ref 74–99)
GLUCOSE SERPL-MCNC: 190 MG/DL (ref 74–99)
HCT VFR BLD AUTO: 36.6 % (ref 41–52)
HGB BLD-MCNC: 12.4 G/DL (ref 13.5–17.5)
HOLD SPECIMEN: NORMAL
INR PPP: 1.2 (ref 0.9–1.1)
MAGNESIUM SERPL-MCNC: 1.6 MG/DL (ref 1.6–2.4)
MCH RBC QN AUTO: 26.6 PG (ref 26–34)
MCHC RBC AUTO-ENTMCNC: 33.9 G/DL (ref 32–36)
MCV RBC AUTO: 78 FL (ref 80–100)
NRBC BLD-RTO: 0 /100 WBCS (ref 0–0)
P AXIS: 41 DEGREES
P OFFSET: 197 MS
P ONSET: 141 MS
PLATELET # BLD AUTO: 376 X10*3/UL (ref 150–450)
POTASSIUM SERPL-SCNC: 3 MMOL/L (ref 3.5–5.3)
PR INTERVAL: 136 MS
PROTHROMBIN TIME: 13.8 SECONDS (ref 9.8–12.8)
Q ONSET: 209 MS
QRS COUNT: 16 BEATS
QRS DURATION: 104 MS
QT INTERVAL: 366 MS
QTC CALCULATION(BAZETT): 462 MS
QTC FREDERICIA: 428 MS
R AXIS: 7 DEGREES
RBC # BLD AUTO: 4.67 X10*6/UL (ref 4.5–5.9)
RH FACTOR (ANTIGEN D): NORMAL
SODIUM SERPL-SCNC: 135 MMOL/L (ref 136–145)
T AXIS: -10 DEGREES
T OFFSET: 392 MS
VENTRICULAR RATE: 96 BPM
WBC # BLD AUTO: 11.4 X10*3/UL (ref 4.4–11.3)

## 2024-09-10 PROCEDURE — 82947 ASSAY GLUCOSE BLOOD QUANT: CPT

## 2024-09-10 PROCEDURE — 3600000007 HC OR TIME - EACH INCREMENTAL 1 MINUTE - PROCEDURE LEVEL TWO: Performed by: ORTHOPAEDIC SURGERY

## 2024-09-10 PROCEDURE — 83735 ASSAY OF MAGNESIUM: CPT | Performed by: NURSE PRACTITIONER

## 2024-09-10 PROCEDURE — 2500000004 HC RX 250 GENERAL PHARMACY W/ HCPCS (ALT 636 FOR OP/ED): Performed by: NURSE PRACTITIONER

## 2024-09-10 PROCEDURE — 87102 FUNGUS ISOLATION CULTURE: CPT | Mod: AHULAB | Performed by: INTERNAL MEDICINE

## 2024-09-10 PROCEDURE — 2500000004 HC RX 250 GENERAL PHARMACY W/ HCPCS (ALT 636 FOR OP/ED): Performed by: STUDENT IN AN ORGANIZED HEALTH CARE EDUCATION/TRAINING PROGRAM

## 2024-09-10 PROCEDURE — 93005 ELECTROCARDIOGRAM TRACING: CPT

## 2024-09-10 PROCEDURE — 7100000001 HC RECOVERY ROOM TIME - INITIAL BASE CHARGE: Performed by: ORTHOPAEDIC SURGERY

## 2024-09-10 PROCEDURE — 2500000004 HC RX 250 GENERAL PHARMACY W/ HCPCS (ALT 636 FOR OP/ED): Performed by: INTERNAL MEDICINE

## 2024-09-10 PROCEDURE — 2500000001 HC RX 250 WO HCPCS SELF ADMINISTERED DRUGS (ALT 637 FOR MEDICARE OP): Performed by: INTERNAL MEDICINE

## 2024-09-10 PROCEDURE — 80048 BASIC METABOLIC PNL TOTAL CA: CPT | Performed by: INTERNAL MEDICINE

## 2024-09-10 PROCEDURE — 86901 BLOOD TYPING SEROLOGIC RH(D): CPT

## 2024-09-10 PROCEDURE — A27310 PR EXPLOR/DRAIN KNEE,INFECTN: Performed by: STUDENT IN AN ORGANIZED HEALTH CARE EDUCATION/TRAINING PROGRAM

## 2024-09-10 PROCEDURE — 2500000004 HC RX 250 GENERAL PHARMACY W/ HCPCS (ALT 636 FOR OP/ED): Performed by: ANESTHESIOLOGIST ASSISTANT

## 2024-09-10 PROCEDURE — 7100000002 HC RECOVERY ROOM TIME - EACH INCREMENTAL 1 MINUTE: Performed by: ORTHOPAEDIC SURGERY

## 2024-09-10 PROCEDURE — 87205 SMEAR GRAM STAIN: CPT | Mod: AHULAB | Performed by: INTERNAL MEDICINE

## 2024-09-10 PROCEDURE — 87493 C DIFF AMPLIFIED PROBE: CPT | Mod: AHULAB | Performed by: EMERGENCY MEDICINE

## 2024-09-10 PROCEDURE — 2500000002 HC RX 250 W HCPCS SELF ADMINISTERED DRUGS (ALT 637 FOR MEDICARE OP, ALT 636 FOR OP/ED): Performed by: NURSE PRACTITIONER

## 2024-09-10 PROCEDURE — 1100000001 HC PRIVATE ROOM DAILY

## 2024-09-10 PROCEDURE — 3600000002 HC OR TIME - INITIAL BASE CHARGE - PROCEDURE LEVEL TWO: Performed by: ORTHOPAEDIC SURGERY

## 2024-09-10 PROCEDURE — 0S9D3ZZ DRAINAGE OF LEFT KNEE JOINT, PERCUTANEOUS APPROACH: ICD-10-PCS | Performed by: ORTHOPAEDIC SURGERY

## 2024-09-10 PROCEDURE — 36415 COLL VENOUS BLD VENIPUNCTURE: CPT

## 2024-09-10 PROCEDURE — 2500000005 HC RX 250 GENERAL PHARMACY W/O HCPCS: Performed by: ANESTHESIOLOGIST ASSISTANT

## 2024-09-10 PROCEDURE — 2500000002 HC RX 250 W HCPCS SELF ADMINISTERED DRUGS (ALT 637 FOR MEDICARE OP, ALT 636 FOR OP/ED): Performed by: INTERNAL MEDICINE

## 2024-09-10 PROCEDURE — 3700000001 HC GENERAL ANESTHESIA TIME - INITIAL BASE CHARGE: Performed by: ORTHOPAEDIC SURGERY

## 2024-09-10 PROCEDURE — 85610 PROTHROMBIN TIME: CPT

## 2024-09-10 PROCEDURE — 2720000007 HC OR 272 NO HCPCS: Performed by: ORTHOPAEDIC SURGERY

## 2024-09-10 PROCEDURE — 05H933Z INSERTION OF INFUSION DEVICE INTO RIGHT BRACHIAL VEIN, PERCUTANEOUS APPROACH: ICD-10-PCS | Performed by: ORTHOPAEDIC SURGERY

## 2024-09-10 PROCEDURE — 0S9D0ZZ DRAINAGE OF LEFT KNEE JOINT, OPEN APPROACH: ICD-10-PCS | Performed by: ORTHOPAEDIC SURGERY

## 2024-09-10 PROCEDURE — 99232 SBSQ HOSP IP/OBS MODERATE 35: CPT | Performed by: INTERNAL MEDICINE

## 2024-09-10 PROCEDURE — 85027 COMPLETE CBC AUTOMATED: CPT | Performed by: INTERNAL MEDICINE

## 2024-09-10 PROCEDURE — 27310 EXPLORATION OF KNEE JOINT: CPT | Performed by: ORTHOPAEDIC SURGERY

## 2024-09-10 PROCEDURE — A27310 PR EXPLOR/DRAIN KNEE,INFECTN: Performed by: ANESTHESIOLOGIST ASSISTANT

## 2024-09-10 PROCEDURE — 3700000002 HC GENERAL ANESTHESIA TIME - EACH INCREMENTAL 1 MINUTE: Performed by: ORTHOPAEDIC SURGERY

## 2024-09-10 RX ORDER — LIDOCAINE HYDROCHLORIDE 20 MG/ML
INJECTION, SOLUTION EPIDURAL; INFILTRATION; INTRACAUDAL; PERINEURAL AS NEEDED
Status: DISCONTINUED | OUTPATIENT
Start: 2024-09-10 | End: 2024-09-10

## 2024-09-10 RX ORDER — FENTANYL CITRATE 50 UG/ML
INJECTION, SOLUTION INTRAMUSCULAR; INTRAVENOUS AS NEEDED
Status: DISCONTINUED | OUTPATIENT
Start: 2024-09-10 | End: 2024-09-10

## 2024-09-10 RX ORDER — MIDAZOLAM HYDROCHLORIDE 1 MG/ML
INJECTION INTRAMUSCULAR; INTRAVENOUS AS NEEDED
Status: DISCONTINUED | OUTPATIENT
Start: 2024-09-10 | End: 2024-09-10

## 2024-09-10 RX ORDER — SODIUM CHLORIDE, SODIUM LACTATE, POTASSIUM CHLORIDE, CALCIUM CHLORIDE 600; 310; 30; 20 MG/100ML; MG/100ML; MG/100ML; MG/100ML
100 INJECTION, SOLUTION INTRAVENOUS CONTINUOUS
Status: DISCONTINUED | OUTPATIENT
Start: 2024-09-10 | End: 2024-09-10 | Stop reason: HOSPADM

## 2024-09-10 RX ORDER — CEFTRIAXONE 1 G/1
INJECTION, POWDER, FOR SOLUTION INTRAMUSCULAR; INTRAVENOUS AS NEEDED
Status: DISCONTINUED | OUTPATIENT
Start: 2024-09-10 | End: 2024-09-10

## 2024-09-10 RX ORDER — HYDROMORPHONE HYDROCHLORIDE 1 MG/ML
INJECTION, SOLUTION INTRAMUSCULAR; INTRAVENOUS; SUBCUTANEOUS AS NEEDED
Status: DISCONTINUED | OUTPATIENT
Start: 2024-09-10 | End: 2024-09-10

## 2024-09-10 RX ORDER — POTASSIUM CHLORIDE 14.9 MG/ML
20 INJECTION INTRAVENOUS ONCE
Status: COMPLETED | OUTPATIENT
Start: 2024-09-10 | End: 2024-09-10

## 2024-09-10 RX ORDER — DIPHENHYDRAMINE HYDROCHLORIDE 50 MG/ML
12.5 INJECTION INTRAMUSCULAR; INTRAVENOUS ONCE AS NEEDED
Status: DISCONTINUED | OUTPATIENT
Start: 2024-09-10 | End: 2024-09-10 | Stop reason: HOSPADM

## 2024-09-10 RX ORDER — PROPOFOL 10 MG/ML
INJECTION, EMULSION INTRAVENOUS AS NEEDED
Status: DISCONTINUED | OUTPATIENT
Start: 2024-09-10 | End: 2024-09-10

## 2024-09-10 RX ORDER — LABETALOL HYDROCHLORIDE 5 MG/ML
5 INJECTION, SOLUTION INTRAVENOUS ONCE AS NEEDED
Status: DISCONTINUED | OUTPATIENT
Start: 2024-09-10 | End: 2024-09-10 | Stop reason: HOSPADM

## 2024-09-10 RX ORDER — LIDOCAINE HYDROCHLORIDE 10 MG/ML
0.1 INJECTION, SOLUTION EPIDURAL; INFILTRATION; INTRACAUDAL; PERINEURAL ONCE
Status: DISCONTINUED | OUTPATIENT
Start: 2024-09-10 | End: 2024-09-10 | Stop reason: HOSPADM

## 2024-09-10 RX ORDER — KETOROLAC TROMETHAMINE 30 MG/ML
INJECTION, SOLUTION INTRAMUSCULAR; INTRAVENOUS AS NEEDED
Status: DISCONTINUED | OUTPATIENT
Start: 2024-09-10 | End: 2024-09-10

## 2024-09-10 RX ORDER — ONDANSETRON HYDROCHLORIDE 2 MG/ML
4 INJECTION, SOLUTION INTRAVENOUS ONCE AS NEEDED
Status: DISCONTINUED | OUTPATIENT
Start: 2024-09-10 | End: 2024-09-10 | Stop reason: HOSPADM

## 2024-09-10 RX ORDER — HYDRALAZINE HYDROCHLORIDE 20 MG/ML
10 INJECTION INTRAMUSCULAR; INTRAVENOUS EVERY 8 HOURS PRN
Status: DISCONTINUED | OUTPATIENT
Start: 2024-09-10 | End: 2024-09-17 | Stop reason: HOSPADM

## 2024-09-10 RX ORDER — METOPROLOL TARTRATE 1 MG/ML
INJECTION, SOLUTION INTRAVENOUS AS NEEDED
Status: DISCONTINUED | OUTPATIENT
Start: 2024-09-10 | End: 2024-09-10

## 2024-09-10 RX ORDER — OXYCODONE HYDROCHLORIDE 5 MG/1
5 TABLET ORAL EVERY 4 HOURS PRN
Status: DISCONTINUED | OUTPATIENT
Start: 2024-09-10 | End: 2024-09-10 | Stop reason: HOSPADM

## 2024-09-10 ASSESSMENT — COGNITIVE AND FUNCTIONAL STATUS - GENERAL
HELP NEEDED FOR BATHING: A LITTLE
MOVING FROM LYING ON BACK TO SITTING ON SIDE OF FLAT BED WITH BEDRAILS: A LITTLE
TOILETING: A LOT
STANDING UP FROM CHAIR USING ARMS: A LOT
DRESSING REGULAR UPPER BODY CLOTHING: A LITTLE
MOVING TO AND FROM BED TO CHAIR: A LITTLE
MOBILITY SCORE: 15
CLIMB 3 TO 5 STEPS WITH RAILING: A LOT
WALKING IN HOSPITAL ROOM: A LOT
DRESSING REGULAR LOWER BODY CLOTHING: A LITTLE
TURNING FROM BACK TO SIDE WHILE IN FLAT BAD: A LITTLE
DAILY ACTIVITIY SCORE: 19

## 2024-09-10 ASSESSMENT — PAIN SCALES - GENERAL
PAINLEVEL_OUTOF10: 7
PAINLEVEL_OUTOF10: 8
PAINLEVEL_OUTOF10: 5 - MODERATE PAIN
PAINLEVEL_OUTOF10: 7
PAINLEVEL_OUTOF10: 6
PAINLEVEL_OUTOF10: 8
PAINLEVEL_OUTOF10: 5 - MODERATE PAIN
PAINLEVEL_OUTOF10: 4
PAINLEVEL_OUTOF10: 7

## 2024-09-10 ASSESSMENT — PAIN DESCRIPTION - ORIENTATION
ORIENTATION: LEFT
ORIENTATION: LEFT

## 2024-09-10 ASSESSMENT — PAIN SCALES - PAIN ASSESSMENT IN ADVANCED DEMENTIA (PAINAD): TOTALSCORE: MEDICATION (SEE MAR)

## 2024-09-10 ASSESSMENT — PAIN DESCRIPTION - LOCATION
LOCATION: KNEE
LOCATION: KNEE

## 2024-09-10 ASSESSMENT — PAIN - FUNCTIONAL ASSESSMENT
PAIN_FUNCTIONAL_ASSESSMENT: 0-10

## 2024-09-10 NOTE — ANESTHESIA PROCEDURE NOTES
Airway  Date/Time: 9/10/2024 2:26 PM  Urgency: elective    Airway not difficult    Staffing  Performed: CAA   Authorized by: Curtis Lanier MD    Performed by: ENEIDA Acosta  Patient location during procedure: OR    Indications and Patient Condition  Indications for airway management: anesthesia  Spontaneous Ventilation: absent  Sedation level: deep  Preoxygenated: yes  Patient position: sniffing  MILS not maintained throughout  Mask difficulty assessment: 0 - not attempted    Final Airway Details  Final airway type: supraglottic airway      Successful airway: Supraglottic airway: IGel.  Size 5     Number of attempts at approach: 1

## 2024-09-10 NOTE — PROGRESS NOTES
"Gabe Linder Jr. is a 39 y.o. male on day 5 of admission presenting with Sepsis (Multi).    Orthopaedic Surgery Progress Note    S:  Seen and evaluated postoperatively in PACU. Pain controlled. Denies new numbness or weakness. No headache, SOB, chest pain.      O:  BP (!) 159/93 (BP Location: Left arm, Patient Position: Lying)   Pulse 107   Temp 36.9 °C (98.4 °F) (Temporal)   Resp 14   Ht 1.803 m (5' 11\")   Wt 95.3 kg (210 lb 1.6 oz)   SpO2 93%   BMI 29.30 kg/m²     Constitutional: NAD  HEENT: hearing and vision grossly intact, MMM  Resp: breathing comfortably   CV: extremities warm, well perfused  Neuro: alert, sensory and motor grossly intact  Psych: Appropriate mood and affect      MSK:  LLE:   - Ace wrap in place. C/d/I  - NEREYDA drain in place, holding suction  -Tender at site of injury with painful ROM.  -Motor intact in DF/PF/EHL/FHL  -SILT in saph/sural/SPN/DPN distributions  -Foot warm, well perfused  - Palpable DP/PT pulse, brisk cap refill  -Compartments soft and compressible      A/P: 39 y.o. male w L knee septic arthritis s/p L knee irrigation and debridement on 9/10/2024 with Dr. Mancia.  Doing well.     Plan:  - Weight bearing: as tolerated  - DVT ppx: SCDs, 4 weeks chemoppx, recommend ASA 81 mg BID  - Diet: Diebateic  - Pain: Multimodal  - Antibiotics: broad spectrum abx, currently on ceftriaxone  -  ID consult, appreciate recs  - Maintain drain, please empty and record output q 8 hrs  - FEN: HLIV with good PO intake  - Bowel Regimen: aggressive bowel regimen  - PT/OT  - Pulm: Encourage IS  - Continue home medications  -    Dispo: pending ID, drain, PT, postoperative course, primary team    Lillie Prince MD PGY4  Orthopedic Resident  [facility]  Available by Epic Message    While inpatient, this patient will be followed by the Orthopaedic [facility] team.      "

## 2024-09-10 NOTE — ANESTHESIA PREPROCEDURE EVALUATION
Patient: Gabe Linder Jr.    Procedure Information       Date/Time: 09/10/24 5010    Procedure: Left Knee Debridement (Left: Knee)    Location: [facility] A OR 11 / Virtual [facility] A OR    Surgeons: Milton Mancia MD            Relevant Problems   ID   (+) Bacterial infection of knee joint (Multi)       Clinical information reviewed:    Allergies  Meds               NPO Detail:  No data recorded     Physical Exam    Airway  Mallampati: II  TM distance: >3 FB  Neck ROM: full     Cardiovascular   Rhythm: regular  Rate: normal     Dental    Pulmonary   Breath sounds clear to auscultation     Abdominal            Anesthesia Plan    History of general anesthesia?: yes  History of complications of general anesthesia?: no    ASA 2     general     intravenous induction   Anesthetic plan and risks discussed with patient.    Plan discussed with CRNA.

## 2024-09-10 NOTE — PROGRESS NOTES
"  INFECTIOUS DISEASE DAILY PROGRESS NOTE    SUBJECTIVE:    No overnight events. No new complaints. Afebrile. No rash/itching/diarrhea. Had tap of the left knee (see below).    OBJECTIVE:  VITALS (Last 24 Hours)  /86 (BP Location: Left arm, Patient Position: Lying)   Pulse 93   Temp 37.1 °C (98.8 °F) (Oral)   Resp 18   Ht 1.803 m (5' 11\")   Wt 95.3 kg (210 lb)   SpO2 96%   BMI 29.29 kg/m²     PHYSICAL EXAM:  Gen - NAD, in bed  Lungs - no wh  Abd - soft, no ttp, BS present  MSK - left knee with some warmth  Skin - no rash    ABX: IV CTX    LABS:  Lab Results   Component Value Date    WBC 10.5 09/09/2024    HGB 12.0 (L) 09/09/2024    HCT 35.4 (L) 09/09/2024    MCV 80 09/09/2024     09/09/2024     Lab Results   Component Value Date    GLUCOSE 134 (H) 09/09/2024    CALCIUM 8.8 09/09/2024     (L) 09/09/2024    K 3.4 (L) 09/09/2024    CO2 30 09/09/2024    CL 96 (L) 09/09/2024    BUN 11 09/09/2024    CREATININE 0.79 09/09/2024     Estimated Creatinine Clearance: 125 mL/min (by C-G formula based on SCr of 0.79 mg/dL).    ASSESSMENT/PLAN:     Sepsis - resolved  H. Flu Bacteremia - not a foodborne illness. Typically from URI type infections but no symptoms like this apparent. Can cause metastatic infections like abscess. Back pain and inability to raise the LLE concerning. CT without spine infection.   Exophytic Left Kidney Lesion - looks like a hemorrhagic or proteinaceous cyst on MRI  Left Knee Native Joint Septic Arthritis - ortho evaluated, s/p arthrocentesis 9/9 with 100cc cloudy fluid. 26K WBCs, negative crystal exam. Gram stain/cx pending.     IV CTX 2g Q24H. Cell count not very high in the left knee but has been on IV abx for 3-4 days already for H. Flu bacteremia. I think the overall presentation is consistent with a septic arthritis in the left knee due to hematogenous spread.    Monitoring for adverse effects of abx such as rash/itching.     Will follow. Thanks!    Yifan Sidhu MD  [facility] " [facility]  Office #[phone]

## 2024-09-10 NOTE — PROGRESS NOTES
Gabe Oneilfield Francis is a 39 y.o. male     Knee concerning for septic arthritis  Discussed with orthopedics  Plan is for washout  Medically cleared for surgery    Review of Systems     Constitutional: Feeling poorly  Cardiovascular: no chest pain   Respiratory: no cough, wheezing or shortness of breath a  Gastrointestinal: no abdominal pain, no constipation, no melena, no nausea, no diarrhea, no vomiting and no blood in stools.   Neurological: no headache,   All other systems have been reviewed and are negative for complaint.       Vitals:    09/10/24 1307   BP: (!) 159/91   Pulse: 99   Resp: 13   Temp: 36.8 °C (98.2 °F)   SpO2: 100%        Scheduled medications  [Transfer Hold] amLODIPine, 5 mg, oral, Daily  [Transfer Hold] cefTRIAXone, 2 g, intravenous, q24h  [Held by provider] enoxaparin, 40 mg, subcutaneous, q24h  [Transfer Hold] insulin lispro, 0-5 Units, subcutaneous, Before meals & nightly  [Transfer Hold] pantoprazole, 40 mg, oral, Daily before breakfast   Or  [Transfer Hold] pantoprazole, 40 mg, intravenous, Daily before breakfast      Continuous medications  sodium chloride 0.9%, 100 mL/hr, Last Rate: 100 mL/hr (09/09/24 1857)      PRN medications  PRN medications: [Transfer Hold] acetaminophen **OR** [Transfer Hold] acetaminophen **OR** [Transfer Hold] acetaminophen, [Transfer Hold] alum-mag hydroxide-simeth, [Transfer Hold] guaiFENesin, [Transfer Hold] HYDROmorphone, [Transfer Hold] ketorolac, [Transfer Hold] melatonin, [Transfer Hold] ondansetron **OR** [Transfer Hold] ondansetron, [Transfer Hold] polyethylene glycol, [Transfer Hold] traMADol    Lab Review   Results from last 7 days   Lab Units 09/10/24  0637 09/09/24  0537 09/08/24  0607   WBC AUTO x10*3/uL 11.4* 10.5 15.7*   HEMOGLOBIN g/dL 12.4* 12.0* 12.2*   HEMATOCRIT % 36.6* 35.4* 36.0*   PLATELETS AUTO x10*3/uL 376 283 228     Results from last 7 days   Lab Units 09/10/24  0637 09/09/24  0536 09/08/24  0607 09/06/24  1006 09/05/24  1618   SODIUM  mmol/L 135* 135* 134*   < > 134*   POTASSIUM mmol/L 3.0* 3.4* 3.3*   < > 3.7   CHLORIDE mmol/L 95* 96* 98   < > 95*   CO2 mmol/L 28 30 25   < > 23   BUN mg/dL 9 11 8   < > 29*   CREATININE mg/dL 0.69 0.79 0.74   < > 2.66*   CALCIUM mg/dL 9.6 8.8 8.7   < > 8.2*   PROTEIN TOTAL g/dL  --   --   --   --  8.0   BILIRUBIN TOTAL mg/dL  --   --   --   --  0.6   ALK PHOS U/L  --   --   --   --  79   ALT U/L  --   --   --   --  73*   AST U/L  --   --   --   --  57*   GLUCOSE mg/dL 190* 134* 161*   < > 192*    < > = values in this interval not displayed.     Results from last 7 days   Lab Units 09/05/24  1618   TROPHS ng/L 14        XR chest 1 view   Final Result   No airspace consolidation or pleural effusion.        MACRO:   None        Signed by: Haja Johnson 9/10/2024 2:53 AM   Dictation workstation:   [id number]      XR knee left 3 views   Final Result   Large suprapatellar effusion.        No acute fracture or dislocation.        MACRO:   None        Signed by: Tyson Yu 9/10/2024 8:14 AM   Dictation workstation:   [id number]      CT thoracic spine w IV contrast   Final Result   No bony abnormality or soft tissue inflammation to suggest   osteomyelitis or discitis are noted.        No stenoses of the thoracic spine are noted.        MACRO:   None        Signed by: Regino Kurtz 9/9/2024 10:14 AM   Dictation workstation:   [id number]      CT lumbar spine w IV contrast   Final Result   Addendum (preliminary) 1 of 1   Interpreted By:  Regino Kurtz,    ADDENDUM:   Enhanced images of the lumbar spine were obtained with coronal and   sagittal reconstructions using 75 mL Omnipaque 350.        The paraspinous and spinal canal soft tissues enhance normally with   no abnormality suggesting infection noted.        Signed by: Regino Kurtz 9/9/2024 10:15 AM        -------- ORIGINAL REPORT --------   Dictation workstation:   [id number]      Final   No bony erosions or inflammatory soft tissue changes suggestive of    osteomyelitis/discitis in the lumbar spine are noted.        MACRO:   None        Signed by: Regino Kurtz 9/9/2024 10:11 AM   Dictation workstation:   [id number]      MR kidney wo IV contrast   Final Result   The questioned left renal lesion (2 cm) likely corresponds to a   hemorrhagic/proteinaceous cyst within limits of noncontrast enhanced   examination.        Hepatic steatosis. Hepatosplenomegaly.        MACRO   None        Signed by: Breana Pimentel 9/9/2024 8:11 AM   Dictation workstation:   [id number]      Lower extremity venous duplex bilateral   Final Result   No sonographic evidence for deep vein thrombosis within the evaluated   veins of the bilateral lower extremities.        MACRO:   None        Signed by: Jeffery Mendez 9/9/2024 5:52 AM   Dictation workstation:   [id number]      CT head wo IV contrast   Final Result   1. No acute intracranial abnormality identified.   2. Small chronic appearing defect along the left parietal calvarium   with overlying soft tissue density/scarring. Correlate for prior   procedure such as dallas hole at this site.             If there is concern for ongoing intracranial abnormality then MRI may   be performed for further follow-up.        MACRO:   None        Signed by: Sha Lombardo 9/5/2024 7:35 PM   Dictation workstation:   [id number]      CT chest abdomen pelvis wo IV contrast   Final Result   1. Hepatomegaly and hepatic steatosis.   2. Soft tissue attenuating exophytic lesion arising from the   posterior aspect of the left kidney, incompletely characterized on   this exam. Recommend follow-up renal mass CT or MRI for further   assessment.        MACRO:   None        Signed by: Sha Lombardo 9/5/2024 7:53 PM   Dictation workstation:   [id number]      XR chest 2 views   Final Result   No acute pathologic findings are identified on this exam.        MACRO:   none        Signed by: Gabe White 9/5/2024 4:56 PM   Dictation workstation:   [id number]             Physical Exam    Constitutional   General appearance: Alert   Pulmonary   Respiratory assessment: No respiratory distress, normal respiratory rhythm and effort.    Auscultation of Lungs: Clear bilateral breath sounds.   Cardiovascular   Auscultation of heart: Apical pulse normal, heart rate and rhythm normal, normal S1 and S2, no murmurs and no pericardial rub.    Exam for edema: No peripheral edema.   Abdomen   Abdominal Exam: No bruits, normal bowel sounds, soft, non-tender, no abdominal mass palpated.    Liver and Spleen exam: No hepato-splenomegaly.   Musculoskeletal   Significant tenderness in the left lower extremity with some swelling  Neurologic   Cranial nerves: Nerves 2-12 were intact, no focal neuro defects.         Assessment/Plan      #Gram-negative bacteremia  Septic arthritis of left knee  Going to OR for I&D  Continue IV antibiotics      #New onset diabetes  Sliding-scale insulin coverage for now  Will go home on oral medications          #Generalized myalgias and arthralgias  Elevated CRP RP and ESR noted  Likely brought on by the sepsis     #Hypertension  Continue medicines

## 2024-09-10 NOTE — OP NOTE
Left Knee Debridement (L) Operative Note     Date: 9/10/2024  OR Location: [facility] OR    Name: Gabe Linder Jr., : 1985, Age: 39 y.o., MRN: [id number], Sex: male    Diagnosis  Pre-op Diagnosis      * Bacterial infection of knee joint (Multi) [M00.869] Post-op Diagnosis     * Bacterial infection of knee joint (Multi) [M00.869]     Procedures  Left Knee Debridement  54446 - IA ARTHRS KNEE DEBRIDEMENT/SHAVING ARTCLR CRTLG      Surgeons      * Milton Mancia - Primary    Resident/Fellow/Other Assistant:  Surgeons and Role:  * No surgeons found with a matching role *    Procedure Summary  Anesthesia: Anesthesia type not filed in the log.  ASA: II  Anesthesia Staff: Anesthesiologist: Curtis Lanier MD  C-AA: ENEIDA Acosta  Estimated Blood Loss: 15mL  Intra-op Medications: Administrations occurring from 1345 to 1515 on 09/10/24:  * No intraprocedure medications in log *           Anesthesia Record               Intraprocedure I/O Totals          Intake    LR bolus 1100.00 mL    Total Intake 1100 mL       Output    Est. Blood Loss 5 mL    Total Output 5 mL       Net    Net Volume 1095 mL          Specimen:   ID Type Source Tests Collected by Time   A : LEFT KNEE SYNOVIAL FLUID Swab ABSCESS FUNGAL CULTURE/SMEAR, TISSUE/WOUND CULTURE/SMEAR Milton Mancia MD 9/10/2024 1447        Staff:   Circulator: Stacia Morrow Person: Myah Tamayo Scrub: Lisa  Scrub Person: Abelardo Tamayo Circulator: Gricelda         Drains and/or Catheters:   Closed/Suction Drain 1 Left;Anterior Knee Bulb 10 Fr. (Active)   Dressing Status Clean;Dry 09/10/24 1525   Status To bulb suction 09/10/24 1525       Tourniquet Times:     Total Tourniquet Time Documented:  Thigh (Left) - 49 minutes  Total: Thigh (Left) - 49 minutes      Implants: None    Findings: left knee cloudy synovial fluid concerning for septic arthritis    Indications: Gabe Linder Jr. is an 39 y.o. male who is having surgery for Bacterial infection of  knee joint (Multi) [M00.869]. Patient presented to [facility] with H. Influenza bacteremia and 4 days of atraumatic left knee pain with swelling and inability to bear weight. A preoperative knee aspirate was performed which demonstrated 99030 white blood cells with 91% neutrophils concerning for septic arthritis of the knee. Given his physical exam and laboratory findings consistent with septic arthritis, the decision was made to ungergo left knee irrigation and debridement. The risks, benefits, and alternatives were discussed with the patient and are documented in the preoperative consent form,   and he elected to proceed with surgery.     The patient was seen in the preoperative area. The risks, benefits, complications, treatment options, non-operative alternatives, expected recovery and outcomes were discussed with the patient. The possibilities of reaction to medication, pulmonary aspiration, injury to surrounding structures, bleeding, recurrent infection, the need for additional procedures, failure to diagnose a condition, and creating a complication requiring transfusion or operation were discussed with the patient. The patient concurred with the proposed plan, giving informed consent.  The site of surgery was properly noted/marked if necessary per policy. The patient has been actively warmed in preoperative area. Preoperative antibiotics have been ordered and given within 1 hours of incision. Venous thrombosis prophylaxis have been ordered including unilateral sequential compression device    The patient was taken back to the operating room where a preinduction timeout was performed which confirmed the patient's identity, procedure to be performed, correct operative site, availability of imaging and implants, allergies, and preoperative antibiotics. Everyone present was in agreement. They were placed under general anesthesia without complication. The patient was transferred to the operating table  and placed in thesupine position. All bony prominences were well-padded. The patient's left lower extremity was then prepped and draped in the usual sterile fashion. A preprocedure timeout was performed which again confirmed the patient's identity, procedure to be performed, and correct operative site.  Once again, everyone present was in agreement. Preoperative antibiotics were administered.      Procedure Details: A nonsterile thigh high tourniquet was applied to the operative leg. The leg was then prepped and draped in a sterile fashion. A standard midline skin incision was made centered about the left knee. Full thickness skin flaps were created for later closure. Hemostasis was obtained using electrocautery throughout the procedure. Underlying extensor mechanism was identified standard medial parapatellar arthrotomy was performed sharply. At this point, murky colored synovial fluid was identified within the knee joint. Cultures were taken of the fluid. The joint was then copiously irrigated with 6 liters of sterile saline via pulsatile lavage. The knee joint was then inspected and the cartilage surface appeared to be intact with no further identified pockets of fluid. Once we were satisfied with our debridement, a drain was  introduced into the joint and exited through the medial thigh. The arthrotomy was then closed using #2 Ethibond suture and 0 vicryl in an interrupted fashion. The subdermal layer was closed with 2-0vycryl followed by runnint 3-0 monocryl, skin glue, steri strips, and a dry sterile dressing.     The patient was then awakened from anesthesia without complication and transferred to the PACU in stable condition. Postoperatively, he will remain on broad spectrum antibiotics and cultures will be followed until finalized. Infectious diseases will be consulted.  He will receive 4 weeks of DVT chemoprophylaxis. He may be weightbearing as tolerated and will follow up in 2 weeks for wound check.        Complications:  None; patient tolerated the procedure well.    Disposition: PACU - hemodynamically stable.  Condition: stable         Additional Details: weightbearing as tolerated, broad spectrum antibiotics, follow up intra-operative cultures, infectious disease consult    Attending Attestation:  Dr. Mancia was present and scrubbed for all critical portions of the procedure.     Milton Mancia  Phone Number: [phone]

## 2024-09-10 NOTE — NURSING NOTE
Met with  Niyah this morning prior to I/D of L Knee. Had knee aspirated yesterday. Blood sugars have been <200mg/DL for 24 hours; getting SSI for coverage. Plan to discharge on PO meds instead of insulin. Pt states he is making good dietary choices while in the hospital. Again, we discussed the need to substitute regular soda/pop for sugar free and reduce other high sugary snacks. Pts is agreeable and willing to make necessary changes. Will follow up with patient s/p I/D.

## 2024-09-10 NOTE — CONSULTS
"Orthopaedic Problems/Injuries:  L knee rule out septic arthritis    History Of Present Illness  Gabe Linder Jr. is a 39 y.o. male presenting with 4 days worsening L knee pain in setting of H flu sepsis. Patient reports malaise since admission found to have above. General body aches resolved with broad spectrum abx but patient reports continued localized L knee pain and inability to bear weight. No prior trauma. Also found to have newly diagnosed DM during this hospital visit w Hgb A1c 9.1     Location: Painful at L knee  Duration: 4 days  Worsened by movement/Palpation, improved with rest  Closed, NVI  Associated symptoms:  no associated numbness/tingling/weakness    Other Injuries: none      Past medical history: per HPI; no history of blood clots  Past surgical history: per HPI, rest reviewed in EMR  Allergies: per EMR  Medications:  - rest per EMR  - Blood thinners: none  Social History:   - EtOH: occasional  - Tobacco: denies  - Other: no IVDU  Family History:  Non-contributory to this patient's acute surgical issue.      Review of Systems:  + fevers, chills, headache, malaise    Past Medical History  He has no past medical history on file.    Surgical History  He has no past surgical history on file.     Social History  He has no history on file for tobacco use, alcohol use, and drug use.    Family History  No family history on file.     Allergies  Shellfish containing products    Review of Systems    No pertinent symptoms related to systems other than musculoskeletal were investigated     Last Recorded Vitals  Blood pressure (!) 149/100, pulse 89, temperature 36.7 °C (98.1 °F), temperature source Temporal, resp. rate 16, height 1.803 m (5' 11\"), weight 95.3 kg (210 lb), SpO2 97%.     Physical Exam  Constitutional: NAD  HEENT: hearing and vision grossly intact, MMM  Resp: breathing comfortably   CV: extremities warm, well perfused  Neuro: alert, sensory and motor grossly intact  Psych: Appropriate mood and " affect      LLE:   - Skin intact  - Large L knee effusion w warmth  -Tender at site of injury with painful ROM.  -Motor intact in DF/PF/EHL/FHL  -SILT in saph/sural/SPN/DPN distributions  -Foot warm, well perfused  - Palpable DP/PT pulse, brisk cap refill  -Compartments soft and compressible         Laboratory Results  Results for orders placed or performed during the hospital encounter of 09/05/24 (from the past 24 hour(s))   POCT GLUCOSE   Result Value Ref Range    POCT Glucose 175 (H) 74 - 99 mg/dL   Basic metabolic panel   Result Value Ref Range    Glucose 134 (H) 74 - 99 mg/dL    Sodium 135 (L) 136 - 145 mmol/L    Potassium 3.4 (L) 3.5 - 5.3 mmol/L    Chloride 96 (L) 98 - 107 mmol/L    Bicarbonate 30 21 - 32 mmol/L    Anion Gap 12 10 - 20 mmol/L    Urea Nitrogen 11 6 - 23 mg/dL    Creatinine 0.79 0.50 - 1.30 mg/dL    eGFR >90 >60 mL/min/1.73m*2    Calcium 8.8 8.6 - 10.3 mg/dL   Magnesium   Result Value Ref Range    Magnesium 1.90 1.60 - 2.40 mg/dL   C-reactive protein   Result Value Ref Range    C-Reactive Protein 18.33 (H) <1.00 mg/dL   CBC   Result Value Ref Range    WBC 10.5 4.4 - 11.3 x10*3/uL    nRBC 0.0 0.0 - 0.0 /100 WBCs    RBC 4.43 (L) 4.50 - 5.90 x10*6/uL    Hemoglobin 12.0 (L) 13.5 - 17.5 g/dL    Hematocrit 35.4 (L) 41.0 - 52.0 %    MCV 80 80 - 100 fL    MCH 27.1 26.0 - 34.0 pg    MCHC 33.9 32.0 - 36.0 g/dL    RDW 12.4 11.5 - 14.5 %    Platelets 283 150 - 450 x10*3/uL   Sedimentation rate, automated   Result Value Ref Range    Sedimentation Rate >130 (H) 0 - 15 mm/h   POCT GLUCOSE   Result Value Ref Range    POCT Glucose 187 (H) 74 - 99 mg/dL   POCT GLUCOSE   Result Value Ref Range    POCT Glucose 177 (H) 74 - 99 mg/dL         Imaging  XR L knee w large effusion, no acute fracture, maintained joint spaces    Procedure:  After verbal consent was obtained, the left knee was prepped and sterilized with chlorhexidine solution. Bony landmarks were identified. An 18 gauge needle was used to aspirate about  100 ccs of cloudy colored synovial fluid. Fluid was sent for cell count with diff, crystals, gram stain and culture. The wound was then cleaned and dressed with a sterile bandage.     Assessment/ Plan:   39M w L knee pain in setting of sepsis and new diagnosis of diabetes w concern for L knee septic arthritis  Plan:   - FU synovial fluid results  - NPO at midnight for tentative upcoming surgery with orthopedics.  - Admit to medicine , clearance pending for OR tomorrow; Appreciate documentation of clearance by primary team  - Please obtain pre-operative labs/studies: T&S, PT/INR, CBC, BMP,CXR, EKG   - Consented and posted to OR schedule for irrigation and debridement L knee w/ orthopedic surgery on 9/10  - Pre-operative ABx: already received broad spectrum abx  - No indication for transfusion pre-operatively  - DVT PPx: SCDs    This consult was staffed with attending physician, Dr. Mancia.     Lillie Prince, PGY-4  Orthopaedic Surgery  On Call Resident

## 2024-09-11 ENCOUNTER — HOME HEALTH ADMISSION (OUTPATIENT)
Dept: HOME HEALTH SERVICES | Facility: HOME HEALTH | Age: 39
End: 2024-09-11
Payer: COMMERCIAL

## 2024-09-11 LAB
ANION GAP SERPL CALC-SCNC: 12 MMOL/L (ref 10–20)
BUN SERPL-MCNC: 11 MG/DL (ref 6–23)
C DIF TOX TCDA+TCDB STL QL NAA+PROBE: NOT DETECTED
CALCIUM SERPL-MCNC: 8.9 MG/DL (ref 8.6–10.3)
CHLORIDE SERPL-SCNC: 97 MMOL/L (ref 98–107)
CO2 SERPL-SCNC: 29 MMOL/L (ref 21–32)
CREAT SERPL-MCNC: 0.73 MG/DL (ref 0.5–1.3)
EGFRCR SERPLBLD CKD-EPI 2021: >90 ML/MIN/1.73M*2
ERYTHROCYTE [DISTWIDTH] IN BLOOD BY AUTOMATED COUNT: 12.4 % (ref 11.5–14.5)
GLUCOSE BLD MANUAL STRIP-MCNC: 170 MG/DL (ref 74–99)
GLUCOSE BLD MANUAL STRIP-MCNC: 188 MG/DL (ref 74–99)
GLUCOSE BLD MANUAL STRIP-MCNC: 203 MG/DL (ref 74–99)
GLUCOSE BLD MANUAL STRIP-MCNC: 244 MG/DL (ref 74–99)
GLUCOSE SERPL-MCNC: 158 MG/DL (ref 74–99)
HCT VFR BLD AUTO: 34.5 % (ref 41–52)
HGB BLD-MCNC: 11.8 G/DL (ref 13.5–17.5)
MAGNESIUM SERPL-MCNC: 1.7 MG/DL (ref 1.6–2.4)
MCH RBC QN AUTO: 27 PG (ref 26–34)
MCHC RBC AUTO-ENTMCNC: 34.2 G/DL (ref 32–36)
MCV RBC AUTO: 79 FL (ref 80–100)
NRBC BLD-RTO: 0 /100 WBCS (ref 0–0)
PLATELET # BLD AUTO: 418 X10*3/UL (ref 150–450)
POTASSIUM SERPL-SCNC: 3.1 MMOL/L (ref 3.5–5.3)
RBC # BLD AUTO: 4.37 X10*6/UL (ref 4.5–5.9)
SODIUM SERPL-SCNC: 135 MMOL/L (ref 136–145)
WBC # BLD AUTO: 16.4 X10*3/UL (ref 4.4–11.3)

## 2024-09-11 PROCEDURE — 2500000004 HC RX 250 GENERAL PHARMACY W/ HCPCS (ALT 636 FOR OP/ED): Performed by: NURSE PRACTITIONER

## 2024-09-11 PROCEDURE — 83735 ASSAY OF MAGNESIUM: CPT | Performed by: NURSE PRACTITIONER

## 2024-09-11 PROCEDURE — 36415 COLL VENOUS BLD VENIPUNCTURE: CPT | Performed by: NURSE PRACTITIONER

## 2024-09-11 PROCEDURE — 2500000001 HC RX 250 WO HCPCS SELF ADMINISTERED DRUGS (ALT 637 FOR MEDICARE OP): Performed by: NURSE PRACTITIONER

## 2024-09-11 PROCEDURE — 99232 SBSQ HOSP IP/OBS MODERATE 35: CPT | Performed by: INTERNAL MEDICINE

## 2024-09-11 PROCEDURE — 85027 COMPLETE CBC AUTOMATED: CPT | Performed by: NURSE PRACTITIONER

## 2024-09-11 PROCEDURE — 2500000001 HC RX 250 WO HCPCS SELF ADMINISTERED DRUGS (ALT 637 FOR MEDICARE OP): Performed by: INTERNAL MEDICINE

## 2024-09-11 PROCEDURE — 2500000002 HC RX 250 W HCPCS SELF ADMINISTERED DRUGS (ALT 637 FOR MEDICARE OP, ALT 636 FOR OP/ED): Performed by: NURSE PRACTITIONER

## 2024-09-11 PROCEDURE — 99231 SBSQ HOSP IP/OBS SF/LOW 25: CPT | Performed by: NURSE PRACTITIONER

## 2024-09-11 PROCEDURE — 80048 BASIC METABOLIC PNL TOTAL CA: CPT | Performed by: NURSE PRACTITIONER

## 2024-09-11 PROCEDURE — 2500000004 HC RX 250 GENERAL PHARMACY W/ HCPCS (ALT 636 FOR OP/ED): Performed by: INTERNAL MEDICINE

## 2024-09-11 PROCEDURE — 1100000001 HC PRIVATE ROOM DAILY

## 2024-09-11 PROCEDURE — 82947 ASSAY GLUCOSE BLOOD QUANT: CPT

## 2024-09-11 RX ORDER — NAPROXEN SODIUM 220 MG/1
81 TABLET, FILM COATED ORAL 2 TIMES DAILY
Status: DISCONTINUED | OUTPATIENT
Start: 2024-09-11 | End: 2024-09-17 | Stop reason: HOSPADM

## 2024-09-11 RX ORDER — LISINOPRIL 10 MG/1
10 TABLET ORAL DAILY
Status: DISCONTINUED | OUTPATIENT
Start: 2024-09-11 | End: 2024-09-13

## 2024-09-11 RX ORDER — POTASSIUM CHLORIDE 14.9 MG/ML
20 INJECTION INTRAVENOUS ONCE
Status: COMPLETED | OUTPATIENT
Start: 2024-09-11 | End: 2024-09-11

## 2024-09-11 ASSESSMENT — COGNITIVE AND FUNCTIONAL STATUS - GENERAL
MOBILITY SCORE: 14
MOVING TO AND FROM BED TO CHAIR: A LOT
TOILETING: A LOT
TURNING FROM BACK TO SIDE WHILE IN FLAT BAD: A LITTLE
DRESSING REGULAR LOWER BODY CLOTHING: A LITTLE
CLIMB 3 TO 5 STEPS WITH RAILING: TOTAL
HELP NEEDED FOR BATHING: A LITTLE
DRESSING REGULAR UPPER BODY CLOTHING: A LITTLE
STANDING UP FROM CHAIR USING ARMS: A LOT
DAILY ACTIVITIY SCORE: 24
DAILY ACTIVITIY SCORE: 19
WALKING IN HOSPITAL ROOM: A LOT
MOBILITY SCORE: 24

## 2024-09-11 ASSESSMENT — PAIN DESCRIPTION - ORIENTATION
ORIENTATION: LEFT

## 2024-09-11 ASSESSMENT — PAIN DESCRIPTION - LOCATION
LOCATION: KNEE

## 2024-09-11 ASSESSMENT — PAIN SCALES - GENERAL
PAINLEVEL_OUTOF10: 8
PAINLEVEL_OUTOF10: 8
PAINLEVEL_OUTOF10: 9
PAINLEVEL_OUTOF10: 9
PAINLEVEL_OUTOF10: 5 - MODERATE PAIN

## 2024-09-11 ASSESSMENT — PAIN - FUNCTIONAL ASSESSMENT
PAIN_FUNCTIONAL_ASSESSMENT: 0-10
PAIN_FUNCTIONAL_ASSESSMENT: 0-10

## 2024-09-11 NOTE — CARE PLAN
The patient's goals for the shift include    The clinical goals for the shift include pt. Will remain hds    Over the shift, the patient is making progress toward the following goals.     Problem: Fall/Injury  Goal: Not fall by end of shift  Outcome: Progressing     Problem: Fall/Injury  Goal: Verbalize understanding of personal risk factors for fall in the hospital  Outcome: Progressing

## 2024-09-11 NOTE — DISCHARGE INSTRUCTIONS
Weight bearing status: as tolerated    VTE Prophylaxis (Blood Clot Prevention): take aspirin 81 mg twice daily for 4 weeks total    ***Antibiotics: ***    Home Medication: Resume all home medications    Resume normal diet     Leave operative dressing in place until 9/17. Then remove and leave incision open to air. Let water run freely over incision when showering, do not scrub. Do not soak in pool or tub. Do not swim in pools or ponds until 3 months after surgery.    Call if any drainage after 7 days, increased redness/warmth/swelling at incision site, pain/tenderness of calf, swelling of calf that does not respond to elevation, SOB/chest pain.    Call for any questions or concerns.       Follow up with Dr. Mancia in 2 weeks. Call [phone] to schedule/confirm appointment.

## 2024-09-11 NOTE — PROGRESS NOTES
Gabe Oneilfield Francis is a 39 y.o. male     Patient feels okay pain under control  Will need 4 weeks of IV antibiotics  Diarrhea persists  Cultures remain pending the    Review of Systems     Constitutional: Feeling poorly  Cardiovascular: no chest pain   Respiratory: no cough, wheezing or shortness of breath a  Gastrointestinal: no abdominal pain, no constipation, no melena, no nausea, no diarrhea, no vomiting and no blood in stools.   Neurological: no headache,   All other systems have been reviewed and are negative for complaint.       Vitals:    09/11/24 0819   BP: 146/84   Pulse: 85   Resp: 18   Temp: 36.2 °C (97.1 °F)   SpO2: 97%        Scheduled medications  amLODIPine, 5 mg, oral, Daily  aspirin, 81 mg, oral, BID  cefTRIAXone, 2 g, intravenous, q24h  insulin lispro, 0-5 Units, subcutaneous, Before meals & nightly  lisinopril, 10 mg, oral, Daily  pantoprazole, 40 mg, oral, Daily before breakfast   Or  pantoprazole, 40 mg, intravenous, Daily before breakfast      Continuous medications  sodium chloride 0.9%, 100 mL/hr, Last Rate: 100 mL/hr (09/11/24 1113)      PRN medications  PRN medications: acetaminophen **OR** acetaminophen **OR** acetaminophen, alum-mag hydroxide-simeth, guaiFENesin, hydrALAZINE, HYDROmorphone, ketorolac, melatonin, ondansetron **OR** ondansetron, polyethylene glycol, traMADol    Lab Review   Results from last 7 days   Lab Units 09/11/24  0511 09/10/24  0637 09/09/24  0537   WBC AUTO x10*3/uL 16.4* 11.4* 10.5   HEMOGLOBIN g/dL 11.8* 12.4* 12.0*   HEMATOCRIT % 34.5* 36.6* 35.4*   PLATELETS AUTO x10*3/uL 418 376 283     Results from last 7 days   Lab Units 09/11/24  0511 09/10/24  0637 09/09/24  0536 09/06/24  1006 09/05/24  1618   SODIUM mmol/L 135* 135* 135*   < > 134*   POTASSIUM mmol/L 3.1* 3.0* 3.4*   < > 3.7   CHLORIDE mmol/L 97* 95* 96*   < > 95*   CO2 mmol/L 29 28 30   < > 23   BUN mg/dL 11 9 11   < > 29*   CREATININE mg/dL 0.73 0.69 0.79   < > 2.66*   CALCIUM mg/dL 8.9 9.6 8.8   < >  8.2*   PROTEIN TOTAL g/dL  --   --   --   --  8.0   BILIRUBIN TOTAL mg/dL  --   --   --   --  0.6   ALK PHOS U/L  --   --   --   --  79   ALT U/L  --   --   --   --  73*   AST U/L  --   --   --   --  57*   GLUCOSE mg/dL 158* 190* 134*   < > 192*    < > = values in this interval not displayed.     Results from last 7 days   Lab Units 09/05/24  1618   TROPHS ng/L 14        XR chest 1 view   Final Result   No airspace consolidation or pleural effusion.        MACRO:   None        Signed by: Haja Johnson 9/10/2024 2:53 AM   Dictation workstation:   [id number]      XR knee left 3 views   Final Result   Large suprapatellar effusion.        No acute fracture or dislocation.        MACRO:   None        Signed by: Tyson Yu 9/10/2024 8:14 AM   Dictation workstation:   [id number]      CT thoracic spine w IV contrast   Final Result   No bony abnormality or soft tissue inflammation to suggest   osteomyelitis or discitis are noted.        No stenoses of the thoracic spine are noted.        MACRO:   None        Signed by: Regino Kurtz 9/9/2024 10:14 AM   Dictation workstation:   [id number]      CT lumbar spine w IV contrast   Final Result   Addendum (preliminary) 1 of 1   Interpreted By:  Regino Kurtz,    ADDENDUM:   Enhanced images of the lumbar spine were obtained with coronal and   sagittal reconstructions using 75 mL Omnipaque 350.        The paraspinous and spinal canal soft tissues enhance normally with   no abnormality suggesting infection noted.        Signed by: Regino Kurtz 9/9/2024 10:15 AM        -------- ORIGINAL REPORT --------   Dictation workstation:   [id number]      Final   No bony erosions or inflammatory soft tissue changes suggestive of   osteomyelitis/discitis in the lumbar spine are noted.        MACRO:   None        Signed by: Regino Kurtz 9/9/2024 10:11 AM   Dictation workstation:   [id number]      MR kidney wo IV contrast   Final Result   The questioned left renal lesion (2 cm) likely  corresponds to a   hemorrhagic/proteinaceous cyst within limits of noncontrast enhanced   examination.        Hepatic steatosis. Hepatosplenomegaly.        MACRO   None        Signed by: Breana Pimentel 9/9/2024 8:11 AM   Dictation workstation:   [id number]      Lower extremity venous duplex bilateral   Final Result   No sonographic evidence for deep vein thrombosis within the evaluated   veins of the bilateral lower extremities.        MACRO:   None        Signed by: Jeffery Mendez 9/9/2024 5:52 AM   Dictation workstation:   [id number]      CT head wo IV contrast   Final Result   1. No acute intracranial abnormality identified.   2. Small chronic appearing defect along the left parietal calvarium   with overlying soft tissue density/scarring. Correlate for prior   procedure such as dallas hole at this site.             If there is concern for ongoing intracranial abnormality then MRI may   be performed for further follow-up.        MACRO:   None        Signed by: Sha Lombardo 9/5/2024 7:35 PM   Dictation workstation:   [id number]      CT chest abdomen pelvis wo IV contrast   Final Result   1. Hepatomegaly and hepatic steatosis.   2. Soft tissue attenuating exophytic lesion arising from the   posterior aspect of the left kidney, incompletely characterized on   this exam. Recommend follow-up renal mass CT or MRI for further   assessment.        MACRO:   None        Signed by: Sha Lombardo 9/5/2024 7:53 PM   Dictation workstation:   [id number]      XR chest 2 views   Final Result   No acute pathologic findings are identified on this exam.        MACRO:   none        Signed by: Gabe White 9/5/2024 4:56 PM   Dictation workstation:   [id number]            Physical Exam    Constitutional   General appearance: Alert   Pulmonary   Respiratory assessment: No respiratory distress, normal respiratory rhythm and effort.    Auscultation of Lungs: Clear bilateral breath sounds.   Cardiovascular   Auscultation of heart:  Apical pulse normal, heart rate and rhythm normal, normal S1 and S2, no murmurs and no pericardial rub.    Exam for edema: No peripheral edema.   Abdomen   Abdominal Exam: No bruits, normal bowel sounds, soft, non-tender, no abdominal mass palpated.    Liver and Spleen exam: No hepato-splenomegaly.   Musculoskeletal   Significant tenderness in the left lower extremity with some swelling  Neurologic   Cranial nerves: Nerves 2-12 were intact, no focal neuro defects.         Assessment/Plan      #Gram-negative bacteremia  Septic arthritis of left knee  S/p I&D  Continue IV antibiotics  Will need 4 weeks of IV antibiotics    #Uncontrolled hypertension  Add lisinopril    #Hypokalemia  Replenish    #Diarrhea  Cultures surprisingly still pending      #New onset diabetes  Sliding-scale insulin coverage for now  Will go home on oral medications          #Generalized myalgias and arthralgias  Elevated CRP RP and ESR noted  Likely brought on by the sepsis

## 2024-09-11 NOTE — PROGRESS NOTES
"  INFECTIOUS DISEASE DAILY PROGRESS NOTE    SUBJECTIVE:    OR went well yesterday. No new complaints today, some knee soreness. No fevers. WBC is up a bit again - reactive to surgery/intra-op Decadron.    OBJECTIVE:  VITALS (Last 24 Hours)  /84 (Patient Position: Lying)   Pulse 85   Temp 36.2 °C (97.1 °F)   Resp 18   Ht 1.803 m (5' 11\")   Wt 95.3 kg (210 lb 1.6 oz)   SpO2 97%   BMI 29.30 kg/m²     PHYSICAL EXAM:  Gen - NAD, in bed  Lungs - no wh  Abd - soft, no ttp, BS present  MSK - left knee with surg dressing/drain  Skin - no rash    ABX: IV CTX    LABS:  Lab Results   Component Value Date    WBC 16.4 (H) 09/11/2024    HGB 11.8 (L) 09/11/2024    HCT 34.5 (L) 09/11/2024    MCV 79 (L) 09/11/2024     09/11/2024     Lab Results   Component Value Date    GLUCOSE 158 (H) 09/11/2024    CALCIUM 8.9 09/11/2024     (L) 09/11/2024    K 3.1 (L) 09/11/2024    CO2 29 09/11/2024    CL 97 (L) 09/11/2024    BUN 11 09/11/2024    CREATININE 0.73 09/11/2024     Estimated Creatinine Clearance: 125 mL/min (by C-G formula based on SCr of 0.73 mg/dL).    ASSESSMENT/PLAN:    H. Flu Bacteremia - unclear original source of infection but is improving  Left Knee Native Joint Septic Arthritis - ortho evaluated, s/p arthrocentesis 9/9 with 100cc cloudy fluid. 26K WBCs, negative crystal exam. S/p OR 9/10 for washout.  Exophytic Left Kidney Lesion - looks like a hemorrhagic or proteinaceous cyst on MRI  Leukocytosis - reactive to OR and Decadron now     IV CTX 2g Q24H still. Eventual plan for 4 weeks of IV abx therapy and PICC line.    Monitoring for adverse effects of abx such as rash/itching - none.     Will follow. Thanks!    Yifan Sidhu MD  [facility]  Office #[phone]      "

## 2024-09-11 NOTE — PROGRESS NOTES
09/11/24 1434   Current Planned Discharge Disposition   Current Planned Discharge Disposition Home H     Pt agreeable to [facility] for IV atbs infusions. Adod 1 -2 days.

## 2024-09-11 NOTE — ANESTHESIA POSTPROCEDURE EVALUATION
Patient: Gabe Linder Jr.    Procedure Summary       Date: 09/10/24 Room / Location: [facility] OR 11 / Virtual [facility] OR    Anesthesia Start: 1418 Anesthesia Stop: 1530    Procedure: Left Knee Debridement (Left: Knee) Diagnosis:       Bacterial infection of knee joint (Multi)      (Bacterial infection of knee joint (Multi) [M00.869])    Surgeons: Milton Mancia MD Responsible Provider: Curtis Lanier MD    Anesthesia Type: general ASA Status: 2            Anesthesia Type: general    Vitals Value Taken Time   /93 09/10/24 1615   Temp 36.9 °C (98.4 °F) 09/10/24 1525   Pulse 107 09/10/24 1615   Resp 14 09/10/24 1615   SpO2 93 % 09/10/24 1615       Anesthesia Post Evaluation    Patient location during evaluation: bedside  Patient participation: complete - patient participated  Level of consciousness: awake  Pain management: adequate  Multimodal analgesia pain management approach  Airway patency: patent  Cardiovascular status: stable  Respiratory status: spontaneous ventilation and unassisted  Hydration status: acceptable  Postoperative Nausea and Vomiting: none  Comments: No significant PONV.        No notable events documented.

## 2024-09-11 NOTE — PROGRESS NOTES
"Gabe Linder Jr. is a 39 y.o. male on day 6 of admission presenting with Sepsis (Multi).    Subjective   Pt reports having increased pain over night, medications are helping. He denies fevers, chills, CP, SOB and N/V.        Objective     Physical Exam    Last Recorded Vitals  Blood pressure 146/84, pulse 85, temperature 36.2 °C (97.1 °F), resp. rate 18, height 1.803 m (5' 11\"), weight 95.3 kg (210 lb 1.6 oz), SpO2 97%.  Intake/Output last 3 Shifts:  I/O last 3 completed shifts:  In: 1580 (16.6 mL/kg) [P.O.:480; IV Piggyback:1100]  Out: 2185 (22.9 mL/kg) [Urine:2150 (0.6 mL/kg/hr); Drains:20; Blood:15]  Weight: 95.3 kg     Medications:   Scheduled medications  amLODIPine, 5 mg, oral, Daily  aspirin, 81 mg, oral, BID  cefTRIAXone, 2 g, intravenous, q24h  insulin lispro, 0-5 Units, subcutaneous, Before meals & nightly  lisinopril, 10 mg, oral, Daily  pantoprazole, 40 mg, oral, Daily before breakfast   Or  pantoprazole, 40 mg, intravenous, Daily before breakfast      Continuous medications  sodium chloride 0.9%, 100 mL/hr, Last Rate: 100 mL/hr (09/11/24 1113)      PRN medications  PRN medications: acetaminophen **OR** acetaminophen **OR** acetaminophen, alum-mag hydroxide-simeth, guaiFENesin, hydrALAZINE, HYDROmorphone, ketorolac, melatonin, ondansetron **OR** ondansetron, polyethylene glycol, traMADol    Relevant Results  Results for orders placed or performed during the hospital encounter of 09/05/24 (from the past 24 hour(s))   Fungal Culture/Smear    Specimen: ABSCESS; Swab   Result Value Ref Range    Fungal Culture/Smear       Culture in progress, a report will be issued when positive or after 2 weeks of incubation.    Fungal Smear No fungal elements seen    Tissue/Wound Culture/Smear    Specimen: ABSCESS; Swab   Result Value Ref Range    Tissue/Wound Culture/Smear No growth to date     Gram Stain (1+) Rare Polymorphonuclear leukocytes     Gram Stain No organisms seen    POCT GLUCOSE   Result Value Ref Range "    POCT Glucose 314 (H) 74 - 99 mg/dL   Basic metabolic panel   Result Value Ref Range    Glucose 158 (H) 74 - 99 mg/dL    Sodium 135 (L) 136 - 145 mmol/L    Potassium 3.1 (L) 3.5 - 5.3 mmol/L    Chloride 97 (L) 98 - 107 mmol/L    Bicarbonate 29 21 - 32 mmol/L    Anion Gap 12 10 - 20 mmol/L    Urea Nitrogen 11 6 - 23 mg/dL    Creatinine 0.73 0.50 - 1.30 mg/dL    eGFR >90 >60 mL/min/1.73m*2    Calcium 8.9 8.6 - 10.3 mg/dL   CBC   Result Value Ref Range    WBC 16.4 (H) 4.4 - 11.3 x10*3/uL    nRBC 0.0 0.0 - 0.0 /100 WBCs    RBC 4.37 (L) 4.50 - 5.90 x10*6/uL    Hemoglobin 11.8 (L) 13.5 - 17.5 g/dL    Hematocrit 34.5 (L) 41.0 - 52.0 %    MCV 79 (L) 80 - 100 fL    MCH 27.0 26.0 - 34.0 pg    MCHC 34.2 32.0 - 36.0 g/dL    RDW 12.4 11.5 - 14.5 %    Platelets 418 150 - 450 x10*3/uL   Magnesium   Result Value Ref Range    Magnesium 1.70 1.60 - 2.40 mg/dL   POCT GLUCOSE   Result Value Ref Range    POCT Glucose 170 (H) 74 - 99 mg/dL   POCT GLUCOSE   Result Value Ref Range    POCT Glucose 203 (H) 74 - 99 mg/dL     Electrocardiogram, 12-lead PRN ACS symptoms    Result Date: 9/10/2024  Normal sinus rhythm Nonspecific T wave abnormality Prolonged QT Abnormal ECG Confirmed by Valentin Vieira ([id number]) on 9/10/2024 9:50:02 AM    XR knee left 3 views    Result Date: 9/10/2024  Interpreted By:  Tyson Yu, STUDY: XR KNEE LEFT 3 VIEWS;  9/9/2024 6:30 pm   INDICATION: Signs/Symptoms:left knee pain.   COMPARISON: None.   ACCESSION NUMBER(S): [id number]   ORDERING CLINICIAN: TERRY GREEN   TECHNIQUE: 3 views  of the  left knee were obtained.   FINDINGS: No significant osteophytic change. No lytic or blastic destructive bone lesion. No acute fracture or dislocation. Prominent fullness in the suprapatellar bursa. No opaque soft tissue foreign body. No periosteal reaction or erosion.       Large suprapatellar effusion.   No acute fracture or dislocation.   MACRO: None   Signed by: Tyson Yu 9/10/2024 8:14 AM Dictation workstation:    [id number]    XR chest 1 view    Result Date: 9/10/2024  Interpreted By:  Haja Johnson, STUDY: XR CHEST 1 VIEW;  9/9/2024 9:51 pm   INDICATION: Signs/Symptoms:Preop.   COMPARISON: 9/5/2024   ACCESSION NUMBER(S): [id number]   ORDERING CLINICIAN: VIKAS GARCIA   FINDINGS: The cardiac silhouette is normal in size. No focal airspace consolidation or pleural effusion. No pneumothorax.       No airspace consolidation or pleural effusion.   MACRO: None   Signed by: Haja Johnson 9/10/2024 2:53 AM Dictation workstation:   [id number]    CT lumbar spine w IV contrast    Addendum Date: 9/9/2024    Interpreted By:  Regino Kurtz, ADDENDUM: Enhanced images of the lumbar spine were obtained with coronal and sagittal reconstructions using 75 mL Omnipaque 350.   The paraspinous and spinal canal soft tissues enhance normally with no abnormality suggesting infection noted.   Signed by: Regino Kurtz 9/9/2024 10:15 AM   -------- ORIGINAL REPORT -------- Dictation workstation:   [id number]    Result Date: 9/9/2024  Interpreted By:  Regino Kurtz, STUDY: CT LUMBAR SPINE W IV CONTRAST  9/9/2024 9:05 am   INDICATION: Signs/Symptoms:back pain, bacteremia. Assess for discitis/OM or abscess     COMPARISON: None.   ACCESSION NUMBER(S): [id number]   ORDERING CLINICIAN: EMMANUEL MURRAY   TECHNIQUE: Axial CT images of the lumbar spine are obtained. Axial, coronal and sagittal reconstructions are provided for review.   FINDINGS: There are 5 lumbar type vertebrae.   No bony erosions suggestive of osteomyelitis are noted. No paraspinous inflammatory changes suggesting infection are noted.   The vertebral body and disc space heights are normal. The vertebral alignment is normal.   No stenoses of the lumbar spinal canal or neural foramina are noted.       No bony erosions or inflammatory soft tissue changes suggestive of osteomyelitis/discitis in the lumbar spine are noted.   MACRO: None   Signed by: Regino Kurtz 9/9/2024 10:11 AM Dictation  workstation:   [id number]    CT thoracic spine w IV contrast    Result Date: 9/9/2024  Interpreted By:  Regino Kurtz, STUDY: CT THORACIC SPINE W IV CONTRAST;  9/9/2024 9:05 am   INDICATION: Signs/Symptoms:back pain, bacteremia. Assess for discitis/OM or abscess.     COMPARISON: None.   ACCESSION NUMBER(S): [id number]   ORDERING CLINICIAN: EMMANUEL MURRAY   TECHNIQUE: Intravenous contrast enhanced CT images of the thoracic spine were obtained using 75 mL Omnipaque 350. Sagittal and coronal reconstructions were created.   FINDINGS: There are 12 rib-bearing thoracic vertebrae.   The vertebral body and disc space heights are normal.   No bony erosions or inflammatory changes in the paraspinous soft tissues suggesting infection are noted.   No stenoses of the spinal canal or neural foramina are noted.       No bony abnormality or soft tissue inflammation to suggest osteomyelitis or discitis are noted.   No stenoses of the thoracic spine are noted.   MACRO: None   Signed by: Regino Kurtz 9/9/2024 10:14 AM Dictation workstation:   [id number]    MR kidney wo IV contrast    Result Date: 9/9/2024  Interpreted By:  Breana Pimentel, STUDY: MR KIDNEY WO IV CONTRAST;  9/8/2024 5:11 pm   INDICATION: Signs/Symptoms:Renal mass.  Gram-negative bacteremia.   COMPARISON: CT chest, abdomen and pelvis 09/05/2024   ACCESSION NUMBER(S): [id number]   ORDERING CLINICIAN: VIKAS GARCIA   TECHNIQUE: MRI of the abdomen was performed without intravenous contrast.   FINDINGS: Kidneys: There is a 2 cm partially exophytic well marginated mass along the posterior aspect of the left mid to lower pole cortex corresponding to the questioned lesion described on the prior CT. This mass demonstrates homogeneous mildly T2 hypointense signal with intrinsic layering T1 hyperintensity. Limited assessment of presence of possible enhancing/solid components due to lack of intravenous contrast.   Liver: Mildly T2 hyperintense lesion in segment 3 measuring 1.1  cm, likely a cyst or hemangioma. Marked homogeneous hepatic steatosis. Hepatomegaly measuring 22 cm craniocaudally. Normal morphology.   Biliary: No intrahepatic or extrahepatic bile duct dilation. No cholelithiasis.   Spleen: No mass. Mild splenomegaly measuring 13 cm craniocaudally.   Pancreas: No mass or duct dilation.   Adrenals: Normal.   GI tract: No thickening or dilation.   Lymph nodes: No lymphadenopathy.   Mesentery/peritoneum: No ascites or fluid collection.   Vasculature: No abdominal aortic aneurysm.   Bones/Lower chest: No suspicious osseous lesion.No pleural effusion.       The questioned left renal lesion (2 cm) likely corresponds to a hemorrhagic/proteinaceous cyst within limits of noncontrast enhanced examination.   Hepatic steatosis. Hepatosplenomegaly.   MACRO None   Signed by: Breana Pimentel 9/9/2024 8:11 AM Dictation workstation:   [id number]    Lower extremity venous duplex bilateral    Result Date: 9/9/2024  Interpreted By:  Jeffery Mendez, STUDY: [facility] US LOWER EXTREMITY VENOUS DUPLEX BILATERAL;  9/8/2024 3:39 pm   INDICATION: Signs/Symptoms:Pedal edema with tenderness.   COMPARISON: None.   ACCESSION NUMBER(S): [id number]   ORDERING CLINICIAN: VIKAS GARCIA   TECHNIQUE: Vascular ultrasound of the bilateral lower extremities was performed. Real-time compression views as well as Gray scale, color Doppler and spectral Doppler waveform analysis was performed.   FINDINGS: Evaluation of the visualized portions of the bilateral common femoral, proximal, mid, and distal femoral, and popliteal veins was performed.  Evaluation of the visualized portions of the posterior tibial and peroneal veins was also performed.   Limitations: None   The evaluated veins demonstrate normal compressibility. There is intact venous flow demonstrating normal respiratory variability and normal augmentation of flow with calf compression.         No sonographic evidence for deep vein thrombosis within the evaluated  veins of the bilateral lower extremities.   MACRO: None   Signed by: Jeffery Mendez 9/9/2024 5:52 AM Dictation workstation:   [id number]    Electrocardiogram, 12-lead PRN ACS symptoms    Result Date: 9/7/2024  Sinus tachycardia Minimal voltage criteria for LVH, may be normal variant ( [place] product ) Borderline ECG No previous ECGs available See ED provider note for full interpretation and clinical correlation Confirmed by Shazia Lala ([id number]) on 9/7/2024 1:25:24 PM    CT chest abdomen pelvis wo IV contrast    Result Date: 9/5/2024  Interpreted By:  Sha Lombardo, STUDY: CT CHEST ABDOMEN PELVIS WO CONTRAST;  9/5/2024 6:30 pm   INDICATION: Signs/Symptoms:leukocytosis, unclear source.     COMPARISON: None.   ACCESSION NUMBER(S): [id number]   ORDERING CLINICIAN: ANDRAE NOLAND   TECHNIQUE: Contiguous axial images of the chest, abdomen, and pelvis were obtained without contrast. Coronal and sagittal reformatted images were reconstructed from the axial data.   FINDINGS: Assessment of the vascular, mediastinal, and abdominopelvic hollow and solid visceral structures limited without IV contrast.   CT CHEST:   MEDIASTINUM AND LYMPH NODES: The visualized thyroid is within normal limits. No enlarged intrathoracic or axillary lymph nodes by imaging criteria. No pneumomediastinum. The esophagus appears within normal limits.   HEART: Normal size. No significant coronary artery calcifications. No significant pericardial effusion.   VESSELS: The aorta and main pulmonary artery are normal in caliber. No significant aortic atherosclerosis. Vascular patency can not be assessed without IV contrast.   LUNG, AIRWAYS, PLEURA: The trachea and proximal mainstem bronchi are patent. No consolidation, pleural effusion, or pneumothorax.   CHEST WALL SOFT TISSUES: No discernible abnormality.   OSSEOUS STRUCTURES: No acute osseous abnormality.     CT ABDOMEN/PELVIS:   LIVER: Enlarged at 23 cm. Hepatic steatosis.   BILE DUCTS: No significant  intrahepatic or extrahepatic dilatation.   GALLBLADDER: Layering density in the gallbladder, likely sludge. No CT evidence of acute cholecystitis.   PANCREAS: No significant abnormality.   SPLEEN: No significant abnormality.   ADRENALS: No significant abnormality.   KIDNEYS, URETERS, BLADDER: 1.8 cm soft tissue attenuating exophytic lesion arising from the posterior aspect of the left kidney, incompletely characterized on this exam. Nonspecific perinephric fat stranding bilaterally. No hydronephrosis or hydroureter. No appreciable renal or ureteral calculus. The bladder is unremarkable.   REPRODUCTIVE ORGANS: No significant abnormality.   GI: No bowel obstruction. No significant bowel wall thickening or adjacent inflammatory change. Submucosal fat deposition within the ascending colon which may be seen in the setting of chronic inflammation. Normal appendix.   VESSELS: No significant abnormality. Vascular patency can not be assessed without IV contrast.   PERITONEUM/RETROPERITONEUM: No intraperitoneal free air or fluid.   LYMPH NODES: No enlarged lymph nodes.   ABDOMINAL WALL: No significant abnormality.   OSSEOUS STRUCTURES: No acute osseous abnormality.       1. Hepatomegaly and hepatic steatosis. 2. Soft tissue attenuating exophytic lesion arising from the posterior aspect of the left kidney, incompletely characterized on this exam. Recommend follow-up renal mass CT or MRI for further assessment.   MACRO: None   Signed by: Sha Lombardo 9/5/2024 7:53 PM Dictation workstation:   [id number]    CT head wo IV contrast    Result Date: 9/5/2024  Interpreted By:  hSa Lombardo, STUDY: CT HEAD WO IV CONTRAST;  9/5/2024 6:30 pm   INDICATION: Signs/Symptoms:headache, generalized weakness.     COMPARISON: None.   ACCESSION NUMBER(S): [id number]   ORDERING CLINICIAN: ANDRAE NOLAND   TECHNIQUE: Noncontrast axial CT images of head were obtained with coronal and sagittal reconstructed images.   FINDINGS: BRAIN PARENCHYMA:  Gray-white differentiation is preserved. No mass-effect, midline shift or effacement of cerebral sulci. No significant white matter disease.   HEMORRHAGE: No acute intracranial hemorrhage.   VENTRICLES and EXTRA-AXIAL SPACES: The ventricles and sulci are within normal limits for brain volume. No abnormal extra-axial fluid collection.   ORBITS: The visualized orbits and globes are within normal limits.   EXTRACRANIAL SOFT TISSUES: Within normal limits.   PARANASAL SINUSES/MASTOIDS: The visualized paranasal sinuses and mastoid air cells are well aerated.   CALVARIUM: No depressed skull fracture. Small chronic appearing defect along the left parietal calvarium with overlying scarring/soft tissue density. Correlate for prior procedure such as a [place] hole at the site.         1. No acute intracranial abnormality identified. 2. Small chronic appearing defect along the left parietal calvarium with overlying soft tissue density/scarring. Correlate for prior procedure such as d[place] hole at this site.     If there is concern for ongoing intracranial abnormality then MRI may be performed for further follow-up.   MACRO: None   Signed by: Sha Lombardo 9/5/2024 7:35 PM Dictation workstation:   [id number]    XR chest 2 views    Result Date: 9/5/2024  Interpreted By:  Gabe White, STUDY: XR CHEST 2 VIEWS; 9/5/2024 4:51 pm   INDICATION: Signs/Symptoms:Cough, body aches.   COMPARISON: 04/13/2016   ACCESSION NUMBER(S): [id number]   ORDERING CLINICIAN: ANDRAE NOLAND   TECHNIQUE: PA and lateral views of the chest were obtained.   FINDINGS: The cardiac silhouette is normal in appearance.   No infiltrates or effusions are identified.       No acute pathologic findings are identified on this exam.   MACRO: none   Signed by: Gabe White 9/5/2024 4:56 PM Dictation workstation:   [id number]       Assessment/Plan   Assessment & Plan  Sepsis (Multi)    Bacterial infection of knee joint (Multi)    Gabe Linder Jr is a 40 yo male who  "is admitted with bacterial H.influenza. He developed Left knee pain and swelling and underwent aspiration with concern for septic arthritis. He is now POD #1 s/p left Knee I&D with Dr Mancia. Intra-op findings showed \" left knee cloudy synovial fluid concerning for septic arthritis.\"     On exam, ace is c/d/I, ice pack in place, NEREYDA drain is SS with 20cc recorded since OR. + PF/DF, palpable pulses, wiggles toes, sensation intact.     Plan:   POD #2 I&D Left Knee  - continue NEREYDA drain and monitor output  - follow cultures  - abx per ID- recommending PICC with 4 weeks of ceftriaxone  - trend WBC- up to 16.4 today, likely 2/2 surgery yesterday  - DVT ppx: SCDs, ambulate, ASA BID x4 weeks   - regular diet  - pain medications as needed  - supportive care per primary team     Dispo: Ortho will continue to follow.     I spent 35 minutes in the professional and overall care of this patient.      Tasha Santamaria, APRN-CNP      "

## 2024-09-11 NOTE — CARE PLAN
The patient's goals for the shift include remain safe    The clinical goals for the shift include pt will have CT spine done    Problem: Fall/Injury  Goal: Not fall by end of shift  Outcome: Progressing  Goal: Be free from injury by end of the shift  Outcome: Progressing  Goal: Verbalize understanding of personal risk factors for fall in the hospital  Outcome: Progressing  Goal: Verbalize understanding of risk factor reduction measures to prevent injury from fall in the home  Outcome: Progressing  Goal: Use assistive devices by end of the shift  Outcome: Progressing  Goal: Pace activities to prevent fatigue by end of the shift  Outcome: Progressing

## 2024-09-12 ENCOUNTER — APPOINTMENT (OUTPATIENT)
Dept: CARDIOLOGY | Facility: HOSPITAL | Age: 39
DRG: 854 | End: 2024-09-12
Payer: COMMERCIAL

## 2024-09-12 ENCOUNTER — DOCUMENTATION (OUTPATIENT)
Dept: HOME HEALTH SERVICES | Facility: HOME HEALTH | Age: 39
End: 2024-09-12
Payer: COMMERCIAL

## 2024-09-12 ENCOUNTER — HOME INFUSION (OUTPATIENT)
Dept: INFUSION THERAPY | Age: 39
End: 2024-09-12
Payer: COMMERCIAL

## 2024-09-12 LAB
ANION GAP SERPL CALC-SCNC: 15 MMOL/L (ref 10–20)
BACTERIA SPEC CULT: NORMAL
BUN SERPL-MCNC: 13 MG/DL (ref 6–23)
CALCIUM SERPL-MCNC: 8.7 MG/DL (ref 8.6–10.3)
CHLORIDE SERPL-SCNC: 100 MMOL/L (ref 98–107)
CO2 SERPL-SCNC: 25 MMOL/L (ref 21–32)
CREAT SERPL-MCNC: 0.68 MG/DL (ref 0.5–1.3)
EGFRCR SERPLBLD CKD-EPI 2021: >90 ML/MIN/1.73M*2
ERYTHROCYTE [DISTWIDTH] IN BLOOD BY AUTOMATED COUNT: 12.6 % (ref 11.5–14.5)
GLUCOSE BLD MANUAL STRIP-MCNC: 157 MG/DL (ref 74–99)
GLUCOSE BLD MANUAL STRIP-MCNC: 158 MG/DL (ref 74–99)
GLUCOSE BLD MANUAL STRIP-MCNC: 158 MG/DL (ref 74–99)
GLUCOSE BLD MANUAL STRIP-MCNC: 162 MG/DL (ref 74–99)
GLUCOSE SERPL-MCNC: 154 MG/DL (ref 74–99)
GRAM STN SPEC: NORMAL
GRAM STN SPEC: NORMAL
HCT VFR BLD AUTO: 33.3 % (ref 41–52)
HGB BLD-MCNC: 11.5 G/DL (ref 13.5–17.5)
MCH RBC QN AUTO: 27.3 PG (ref 26–34)
MCHC RBC AUTO-ENTMCNC: 34.5 G/DL (ref 32–36)
MCV RBC AUTO: 79 FL (ref 80–100)
NRBC BLD-RTO: 0 /100 WBCS (ref 0–0)
PLATELET # BLD AUTO: 498 X10*3/UL (ref 150–450)
POTASSIUM SERPL-SCNC: 3 MMOL/L (ref 3.5–5.3)
RBC # BLD AUTO: 4.22 X10*6/UL (ref 4.5–5.9)
SODIUM SERPL-SCNC: 137 MMOL/L (ref 136–145)
WBC # BLD AUTO: 17.4 X10*3/UL (ref 4.4–11.3)

## 2024-09-12 PROCEDURE — 36410 VNPNXR 3YR/> PHY/QHP DX/THER: CPT

## 2024-09-12 PROCEDURE — 2500000001 HC RX 250 WO HCPCS SELF ADMINISTERED DRUGS (ALT 637 FOR MEDICARE OP): Performed by: NURSE PRACTITIONER

## 2024-09-12 PROCEDURE — 2500000001 HC RX 250 WO HCPCS SELF ADMINISTERED DRUGS (ALT 637 FOR MEDICARE OP): Performed by: INTERNAL MEDICINE

## 2024-09-12 PROCEDURE — 2780000003 HC OR 278 NO HCPCS

## 2024-09-12 PROCEDURE — 2500000004 HC RX 250 GENERAL PHARMACY W/ HCPCS (ALT 636 FOR OP/ED): Performed by: NURSE PRACTITIONER

## 2024-09-12 PROCEDURE — 97165 OT EVAL LOW COMPLEX 30 MIN: CPT | Mod: GO

## 2024-09-12 PROCEDURE — 2500000002 HC RX 250 W HCPCS SELF ADMINISTERED DRUGS (ALT 637 FOR MEDICARE OP, ALT 636 FOR OP/ED): Performed by: NURSE PRACTITIONER

## 2024-09-12 PROCEDURE — 1100000001 HC PRIVATE ROOM DAILY

## 2024-09-12 PROCEDURE — 99232 SBSQ HOSP IP/OBS MODERATE 35: CPT | Performed by: STUDENT IN AN ORGANIZED HEALTH CARE EDUCATION/TRAINING PROGRAM

## 2024-09-12 PROCEDURE — 2500000004 HC RX 250 GENERAL PHARMACY W/ HCPCS (ALT 636 FOR OP/ED): Performed by: INTERNAL MEDICINE

## 2024-09-12 PROCEDURE — 97161 PT EVAL LOW COMPLEX 20 MIN: CPT | Mod: GP

## 2024-09-12 PROCEDURE — C1751 CATH, INF, PER/CENT/MIDLINE: HCPCS

## 2024-09-12 PROCEDURE — 2500000001 HC RX 250 WO HCPCS SELF ADMINISTERED DRUGS (ALT 637 FOR MEDICARE OP): Performed by: STUDENT IN AN ORGANIZED HEALTH CARE EDUCATION/TRAINING PROGRAM

## 2024-09-12 PROCEDURE — 2500000002 HC RX 250 W HCPCS SELF ADMINISTERED DRUGS (ALT 637 FOR MEDICARE OP, ALT 636 FOR OP/ED): Performed by: INTERNAL MEDICINE

## 2024-09-12 PROCEDURE — 85027 COMPLETE CBC AUTOMATED: CPT | Performed by: NURSE PRACTITIONER

## 2024-09-12 PROCEDURE — 2720000007 HC OR 272 NO HCPCS

## 2024-09-12 PROCEDURE — 99232 SBSQ HOSP IP/OBS MODERATE 35: CPT | Performed by: INTERNAL MEDICINE

## 2024-09-12 PROCEDURE — 80048 BASIC METABOLIC PNL TOTAL CA: CPT | Performed by: NURSE PRACTITIONER

## 2024-09-12 PROCEDURE — 36415 COLL VENOUS BLD VENIPUNCTURE: CPT | Performed by: NURSE PRACTITIONER

## 2024-09-12 PROCEDURE — 97530 THERAPEUTIC ACTIVITIES: CPT | Mod: GP

## 2024-09-12 PROCEDURE — 82947 ASSAY GLUCOSE BLOOD QUANT: CPT

## 2024-09-12 RX ORDER — LIDOCAINE HYDROCHLORIDE 10 MG/ML
5 INJECTION, SOLUTION INFILTRATION; PERINEURAL ONCE
Status: DISCONTINUED | OUTPATIENT
Start: 2024-09-12 | End: 2024-09-17 | Stop reason: HOSPADM

## 2024-09-12 RX ORDER — POLYETHYLENE GLYCOL 3350 17 G/17G
17 POWDER, FOR SOLUTION ORAL DAILY
Status: DISCONTINUED | OUTPATIENT
Start: 2024-09-12 | End: 2024-09-17 | Stop reason: HOSPADM

## 2024-09-12 RX ORDER — CYCLOBENZAPRINE HCL 5 MG
5 TABLET ORAL 3 TIMES DAILY PRN
Qty: 21 TABLET | Refills: 0 | Status: CANCELLED | OUTPATIENT
Start: 2024-09-12

## 2024-09-12 RX ORDER — LISINOPRIL 10 MG/1
10 TABLET ORAL DAILY
Qty: 30 TABLET | Refills: 0 | Status: CANCELLED | OUTPATIENT
Start: 2024-09-13 | End: 2024-10-13

## 2024-09-12 RX ORDER — NAPROXEN SODIUM 220 MG/1
81 TABLET, FILM COATED ORAL 2 TIMES DAILY
Qty: 30 TABLET | Refills: 0 | Status: CANCELLED | OUTPATIENT
Start: 2024-09-12

## 2024-09-12 RX ORDER — DOCUSATE SODIUM 100 MG/1
100 CAPSULE, LIQUID FILLED ORAL 2 TIMES DAILY
Status: DISCONTINUED | OUTPATIENT
Start: 2024-09-12 | End: 2024-09-17 | Stop reason: HOSPADM

## 2024-09-12 RX ORDER — CYCLOBENZAPRINE HCL 5 MG
5 TABLET ORAL 3 TIMES DAILY
Status: DISCONTINUED | OUTPATIENT
Start: 2024-09-12 | End: 2024-09-17 | Stop reason: HOSPADM

## 2024-09-12 RX ORDER — DOCUSATE SODIUM 100 MG/1
100 CAPSULE, LIQUID FILLED ORAL 2 TIMES DAILY
Qty: 14 CAPSULE | Refills: 0 | Status: CANCELLED | OUTPATIENT
Start: 2024-09-12

## 2024-09-12 RX ORDER — METFORMIN HYDROCHLORIDE 500 MG/1
500 TABLET ORAL
Status: DISCONTINUED | OUTPATIENT
Start: 2024-09-12 | End: 2024-09-17 | Stop reason: HOSPADM

## 2024-09-12 RX ORDER — AMLODIPINE BESYLATE 10 MG/1
10 TABLET ORAL DAILY
Status: DISCONTINUED | OUTPATIENT
Start: 2024-09-12 | End: 2024-09-17 | Stop reason: HOSPADM

## 2024-09-12 RX ORDER — POTASSIUM CHLORIDE 14.9 MG/ML
20 INJECTION INTRAVENOUS ONCE
Status: COMPLETED | OUTPATIENT
Start: 2024-09-12 | End: 2024-09-12

## 2024-09-12 ASSESSMENT — COGNITIVE AND FUNCTIONAL STATUS - GENERAL
DRESSING REGULAR LOWER BODY CLOTHING: A LITTLE
DAILY ACTIVITIY SCORE: 23
MOBILITY SCORE: 24
MOVING TO AND FROM BED TO CHAIR: A LITTLE
DAILY ACTIVITIY SCORE: 24
MOVING FROM LYING ON BACK TO SITTING ON SIDE OF FLAT BED WITH BEDRAILS: A LITTLE
DRESSING REGULAR UPPER BODY CLOTHING: A LITTLE
DRESSING REGULAR LOWER BODY CLOTHING: A LOT
WALKING IN HOSPITAL ROOM: A LITTLE
MOVING TO AND FROM BED TO CHAIR: A LITTLE
TOILETING: A LITTLE
CLIMB 3 TO 5 STEPS WITH RAILING: A LITTLE
CLIMB 3 TO 5 STEPS WITH RAILING: A LOT
MOBILITY SCORE: 21
STANDING UP FROM CHAIR USING ARMS: A LITTLE
DAILY ACTIVITIY SCORE: 17
MOBILITY SCORE: 17
WALKING IN HOSPITAL ROOM: A LITTLE
TURNING FROM BACK TO SIDE WHILE IN FLAT BAD: A LITTLE
HELP NEEDED FOR BATHING: A LOT
PERSONAL GROOMING: A LITTLE

## 2024-09-12 ASSESSMENT — PAIN SCALES - GENERAL
PAINLEVEL_OUTOF10: 8
PAINLEVEL_OUTOF10: 5 - MODERATE PAIN
PAINLEVEL_OUTOF10: 9
PAINLEVEL_OUTOF10: 10 - WORST POSSIBLE PAIN
PAINLEVEL_OUTOF10: 9
PAINLEVEL_OUTOF10: 9
PAINLEVEL_OUTOF10: 8
PAINLEVEL_OUTOF10: 9

## 2024-09-12 ASSESSMENT — PAIN DESCRIPTION - ORIENTATION
ORIENTATION: LEFT

## 2024-09-12 ASSESSMENT — PAIN - FUNCTIONAL ASSESSMENT
PAIN_FUNCTIONAL_ASSESSMENT: 0-10

## 2024-09-12 ASSESSMENT — PAIN DESCRIPTION - LOCATION
LOCATION: LEG
LOCATION: KNEE
LOCATION: LEG
LOCATION: KNEE
LOCATION: LEG

## 2024-09-12 ASSESSMENT — PAIN DESCRIPTION - DESCRIPTORS: DESCRIPTORS: ACHING;BURNING

## 2024-09-12 ASSESSMENT — PAIN SCALES - PAIN ASSESSMENT IN ADVANCED DEMENTIA (PAINAD): TOTALSCORE: MEDICATION (SEE MAR)

## 2024-09-12 ASSESSMENT — ACTIVITIES OF DAILY LIVING (ADL)
ADL_ASSISTANCE: INDEPENDENT
EFFECT OF PAIN ON DAILY ACTIVITIES: OT TO TOLERANCE

## 2024-09-12 NOTE — PROGRESS NOTES
09/12/24 1414   Current Planned Discharge Disposition   Current Planned Discharge Disposition Home H     Pt has picc line placed. [facility] insurance check. Pt is agreeable to costs. [facility] confirmed can do soc Saturday. Pt will dc tomorrow after IV atb dose.

## 2024-09-12 NOTE — PROGRESS NOTES
Gabe Varelaeliu Francis is a 39 y.o. male     Stool cultures negative  Blood pressures remain elevated    Review of Systems     Constitutional: Feeling poorly  Cardiovascular: no chest pain   Respiratory: no cough, wheezing or shortness of breath a  Gastrointestinal: no abdominal pain, no constipation, no melena, no nausea, no diarrhea, no vomiting and no blood in stools.   Neurological: no headache,   All other systems have been reviewed and are negative for complaint.       Vitals:    09/12/24 1647   BP: (!) 167/95   Pulse: 105   Resp: 18   Temp: 36.8 °C (98.3 °F)   SpO2: 93%        Scheduled medications  amLODIPine, 10 mg, oral, Daily  aspirin, 81 mg, oral, BID  cefTRIAXone, 2 g, intravenous, q24h  cyclobenzaprine, 5 mg, oral, TID  docusate sodium, 100 mg, oral, BID  insulin lispro, 0-5 Units, subcutaneous, Before meals & nightly  lidocaine, 5 mL, infiltration, Once  lisinopril, 10 mg, oral, Daily  metFORMIN, 500 mg, oral, BID  pantoprazole, 40 mg, oral, Daily before breakfast   Or  pantoprazole, 40 mg, intravenous, Daily before breakfast  polyethylene glycol, 17 g, oral, Daily      Continuous medications       PRN medications  PRN medications: acetaminophen **OR** acetaminophen **OR** acetaminophen, alum-mag hydroxide-simeth, guaiFENesin, hydrALAZINE, HYDROmorphone, ketorolac, melatonin, ondansetron **OR** ondansetron, traMADol    Lab Review   Results from last 7 days   Lab Units 09/12/24  0649 09/11/24  0511 09/10/24  0637   WBC AUTO x10*3/uL 17.4* 16.4* 11.4*   HEMOGLOBIN g/dL 11.5* 11.8* 12.4*   HEMATOCRIT % 33.3* 34.5* 36.6*   PLATELETS AUTO x10*3/uL 498* 418 376     Results from last 7 days   Lab Units 09/12/24  0648 09/11/24  0511 09/10/24  0637   SODIUM mmol/L 137 135* 135*   POTASSIUM mmol/L 3.0* 3.1* 3.0*   CHLORIDE mmol/L 100 97* 95*   CO2 mmol/L 25 29 28   BUN mg/dL 13 11 9   CREATININE mg/dL 0.68 0.73 0.69   CALCIUM mg/dL 8.7 8.9 9.6   GLUCOSE mg/dL 154* 158* 190*              Bedside Midline Imaging    Final Result      XR chest 1 view   Final Result   No airspace consolidation or pleural effusion.        MACRO:   None        Signed by: Haja Johnson 9/10/2024 2:53 AM   Dictation workstation:   [id number]      XR knee left 3 views   Final Result   Large suprapatellar effusion.        No acute fracture or dislocation.        MACRO:   None        Signed by: Tyson Yu 9/10/2024 8:14 AM   Dictation workstation:   [id number]      CT thoracic spine w IV contrast   Final Result   No bony abnormality or soft tissue inflammation to suggest   osteomyelitis or discitis are noted.        No stenoses of the thoracic spine are noted.        MACRO:   None        Signed by: Regino Kurtz 9/9/2024 10:14 AM   Dictation workstation:   [id number]      CT lumbar spine w IV contrast   Final Result   Addendum (preliminary) 1 of 1   Interpreted By:  Regino Kurtz,    ADDENDUM:   Enhanced images of the lumbar spine were obtained with coronal and   sagittal reconstructions using 75 mL Omnipaque 350.        The paraspinous and spinal canal soft tissues enhance normally with   no abnormality suggesting infection noted.        Signed by: Regino Kurtz 9/9/2024 10:15 AM        -------- ORIGINAL REPORT --------   Dictation workstation:   [id number]      Final   No bony erosions or inflammatory soft tissue changes suggestive of   osteomyelitis/discitis in the lumbar spine are noted.        MACRO:   None        Signed by: Regino Kurtz 9/9/2024 10:11 AM   Dictation workstation:   [id number]      MR kidney wo IV contrast   Final Result   The questioned left renal lesion (2 cm) likely corresponds to a   hemorrhagic/proteinaceous cyst within limits of noncontrast enhanced   examination.        Hepatic steatosis. Hepatosplenomegaly.        MACRO   None        Signed by: Breana Pimentel 9/9/2024 8:11 AM   Dictation workstation:   [id number]      Lower extremity venous duplex bilateral   Final Result   No sonographic evidence for deep vein  thrombosis within the evaluated   veins of the bilateral lower extremities.        MACRO:   None        Signed by: Jeffery Mendez 9/9/2024 5:52 AM   Dictation workstation:   [id number]      CT head wo IV contrast   Final Result   1. No acute intracranial abnormality identified.   2. Small chronic appearing defect along the left parietal calvarium   with overlying soft tissue density/scarring. Correlate for prior   procedure such as dallas hole at this site.             If there is concern for ongoing intracranial abnormality then MRI may   be performed for further follow-up.        MACRO:   None        Signed by: Sha Lombardo 9/5/2024 7:35 PM   Dictation workstation:   [id number]      CT chest abdomen pelvis wo IV contrast   Final Result   1. Hepatomegaly and hepatic steatosis.   2. Soft tissue attenuating exophytic lesion arising from the   posterior aspect of the left kidney, incompletely characterized on   this exam. Recommend follow-up renal mass CT or MRI for further   assessment.        MACRO:   None        Signed by: Sha Lombardo 9/5/2024 7:53 PM   Dictation workstation:   [id number]      XR chest 2 views   Final Result   No acute pathologic findings are identified on this exam.        MACRO:   none        Signed by: Gabe White 9/5/2024 4:56 PM   Dictation workstation:   [id number]            Physical Exam    Constitutional   General appearance: Alert   Pulmonary   Respiratory assessment: No respiratory distress, normal respiratory rhythm and effort.    Auscultation of Lungs: Clear bilateral breath sounds.   Cardiovascular   Auscultation of heart: Apical pulse normal, heart rate and rhythm normal, normal S1 and S2, no murmurs and no pericardial rub.    Exam for edema: No peripheral edema.   Abdomen   Abdominal Exam: No bruits, normal bowel sounds, soft, non-tender, no abdominal mass palpated.    Liver and Spleen exam: No hepato-splenomegaly.   Musculoskeletal   Significant tenderness in the left  lower extremity with some swelling  Neurologic   Cranial nerves: Nerves 2-12 were intact, no focal neuro defects.         Assessment/Plan      #Gram-negative bacteremia  Septic arthritis of left knee  S/p I&D  Continue IV antibiotics  Will need 4 weeks of IV antibiotics    #Uncontrolled hypertension  Increase lisinopril    #Hypokalemia  Replenish    #Diarrhea  Stool cultures negative      #New onset diabetes  Start metformin          #Generalized myalgias and arthralgias  Elevated CRP RP and ESR noted  Likely brought on by the sepsis

## 2024-09-12 NOTE — HH CARE COORDINATION
Home Care received a Referral for Infusion, Nursing, Physical Therapy, and Occupational Therapy. We have processed the referral for a Start of Care on 9/14/24.     If you have any questions or concerns, please feel free to contact us at [phone]. Follow the prompts, enter your five digit zip code, and you will be directed to your care team on [facility] 2.

## 2024-09-12 NOTE — CARE PLAN
The patient's goals for the shift include remain safe    The clinical goals for the shift include pt will remain hds & free from falls    Over the shift, the patient did not make progress toward the following goals. Barriers to progression include . Recommendations to address these barriers include .

## 2024-09-12 NOTE — PROCEDURES
Midline Insertion Procedure Note    Procedure: Insertion of #4 FR/18G Midline    Indications:  Long Term IV therapy    Procedure Details   Informed consent was obtained for the procedure.     Maximum sterile technique was used including antiseptics, cap, gloves, gown, hand hygiene, mask, and sheet.    Sedation: No    #4 FR/18G Midline inserted to the R Upper Arm vein per hospital protocol.   Blood return:  yes    Findings:  Total catheter length 11 cm, with 0 cm. Exposed. Mid upper arm circumference is 32 cm.  Catheter was flushed with 20 cc NS. Patient did tolerate procedure well.    Recommendations:    Single lumen Midline inserted utilizing modified Seldinger technique under ultrasound guidance without difficulty or complications.   Midline Brochure given to patient with infection prevention teaching instruction.  Ok to use Midline.      Vascular Access Team Procedure Note     Visit Date: 9/12/2024      Patient Name: Gabe Linder Jr.         MRN: [id number]             Procedure: midline insertion           Midline 09/12/24 Single lumen Right Brachial vein (Active)   09/12/24 1200 Brachial vein   Earliest Known Present:    Placed by External Staff?:    Hand Hygiene Completed: Yes   Catheter Time Out Checklist Completed: Yes   Size (Fr): 4   Lumen Type: Single lumen   Description (optional):    Catheter to Vein Ratio Less Than 45%: Yes   Total Length (cm): 11 cm   External Length (cm): 0 cm   Orientation: Right   Site Prep: Chlorhexidine ;Usual sterile procedure followed   Local Anesthetic: Injectable   Indication: Parenteral medications   Insertion Team Members In The Room: Nurse   Initial Extremity Circumference (cm): 32 cm   Placed by: Luz Browning RN   Insertion attempts: 1   Patient Tolerance: Tolerated well   Comfort Measures: Positioning;Subcutaneous anesthetic   Procedure Location: Bedside   Safety Measures: Patient specific safety measures addressed with RN   Estimated Blood Loss (mL): 2 mL   Vessel  Fully Compressible Proximally and Distally to Insertion Site: Yes   Brisk Blood Return Obtained and Line Draws Easily: Yes   Catheter Tip Location: Peripheral/midline   Line Confirmation: Blood return;Non-pulsatile blood flow;Ultrasound   Lot #: [id number]   :    Expiration Date: 08/31/25   Securement Method: Stat lock;Transparent dressing   Number of Sutures Placed: 0   Post Procedure Checklist: Handoff with RN;Bed at lowest level and wheels locked;Obtain all new IV tubing prior to use;Line discharge information at bedside   Earliest Known Removed:    Removal Reason :    Removal Catheter Length (cm):    Tip Intact:    Site Prep Agent has Completely Dried Before Insertion:    All 5 Sterile Barriers Used (Gloves, Gown, Cap, Mask, Large Sterile Drape):    Placement Verification:    Catheter Tip Cultured:    Securement Method:    Site Assessment Clean;Dry;Intact 09/12/24 1229   Proximal Lumen Status Blood return noted;Flushed 09/12/24 1229   Dressing Type Antimicrobial patch;Transparent;Securing device 09/12/24 1229   Dressing Status Clean;Dry 09/12/24 1229   Dressing Change Due 09/19/24 09/12/24 1229                 Yolis Browning RN  9/12/2024  12:30 PM

## 2024-09-12 NOTE — NURSING NOTE
Attempted to see Mr. Linder this morning. Per the patient he was tired and did not feel up for a diabetes education visit. PICC line to be placed today for IVAB. Had high BS yesterday of 244mg/dL; <160 on morning labs. Will continue to follow as appropriate.

## 2024-09-12 NOTE — PROGRESS NOTES
Gabe Linder Jr. is a 39 y.o. male on day 7 of admission presenting with Sepsis (Multi).      Subjective   RAMONE. Endorsing musculoskeletal pain around knee. Appetite returning to normal.      .  Output by Drain (mL) 09/10/24 0700 - 09/10/24 1859 09/10/24 1900 - 09/11/24 0659 09/11/24 0700 - 09/11/24 1859 09/11/24 1900 - 09/12/24 0659 09/12/24 0700 - 09/12/24 0950   Closed/Suction Drain 1 Left;Anterior Knee Bulb 10 Fr. 20    20        Objective     PE:  Constitutional: A&Ox3, calm and cooperative, NAD  Eyes: EOMI, clear sclera   Cardiovascular: Normal rate and regular rhythm. No murmurs  Respiratory/Thorax: CTAB, on RA  Genitourinary: Voiding independently   Musculoskeletal: left knee mepilex CDI w/o surrounding erythema or drainage, HV with SS output- 0 ml/24 hours. fires EHL/FHL/TA/TP/EDL/FDL. SILT in SP/DP/T distribution. 2+ DP/PT, <2 CRT. Compartments soft, compressible. ACE in place  Extremities: No peripheral edema  Neurological: A&Ox3, No focal deficits   Psychological: Appropriate mood and behavior      Last Recorded Vitals  Vitals:    09/11/24 1511 09/11/24 2018 09/12/24 0521 09/12/24 0824   BP: 154/84 155/74 (!) 160/93 154/78   BP Location:  Right arm  Right arm   Patient Position: Lying Lying  Lying   Pulse: 95 64 88 90   Resp: 18 18 18 18   Temp: 36.7 °C (98.1 °F) 36.8 °C (98.2 °F) 36.7 °C (98 °F) 36.8 °C (98.2 °F)   TempSrc:  Temporal  Temporal   SpO2: 99% 100% 99% 96%   Weight:       Height:             Relevant Results    Imaging:     .=== 09/05/24 ===    XR CHEST 1 VIEW    - Impression -  No airspace consolidation or pleural effusion.    MACRO:  None    Signed by: Haja Johnson 9/10/2024 2:53 AM  Dictation workstation:   [id number]   .=== 09/05/24 ===    CT LUMBAR SPINE W IV CONTRAST    Addendum 9/9/2024 10:15 AM -------------------------------------------------  Interpreted By:  Regino Kurtz,  ADDENDUM:  Enhanced images of the lumbar spine were obtained with coronal and  sagittal reconstructions  using 75 mL Omnipaque 350.    The paraspinous and spinal canal soft tissues enhance normally with  no abnormality suggesting infection noted.    Signed by: Regino Kurtz 9/9/2024 10:15 AM    -------- ORIGINAL REPORT --------  Dictation workstation:   [id number]    - Impression -  No bony erosions or inflammatory soft tissue changes suggestive of  osteomyelitis/discitis in the lumbar spine are noted.    MACRO:  None    Signed by: Regino Kurtz 9/9/2024 10:11 AM  Dictation workstation:   [id number]         Lab Results:   Lab Results   Component Value Date    WBC 17.4 (H) 09/12/2024    HGB 11.5 (L) 09/12/2024    HCT 33.3 (L) 09/12/2024    MCV 79 (L) 09/12/2024     (H) 09/12/2024     Lab Results   Component Value Date    GLUCOSE 154 (H) 09/12/2024    CALCIUM 8.7 09/12/2024     09/12/2024    K 3.0 (L) 09/12/2024    CO2 25 09/12/2024     09/12/2024    BUN 13 09/12/2024    CREATININE 0.68 09/12/2024         Estimated Creatinine Clearance: 125 mL/min (by C-G formula based on SCr of 0.68 mg/dL).  C-Reactive Protein   Date/Time Value Ref Range Status   09/09/2024 05:36 AM 18.33 (H) <1.00 mg/dL Final         Assessment/Plan   Gabe Linder Jr is a 40 yo male who is admitted with bacterial H.influenza. He developed Left knee pain and swelling and underwent aspiration with concern for septic arthritis.     now POD #2  left Knee I&D with Dr Mancia  Intra-op findings : left knee cloudy synovial fluid concerning for septic arthritis.       A/P:  - Afebrile, VSS  - WBAT with FWW  - PT/OOB/ mobilize  - add bowel regimen: colace BID, miralax daily   - DVT prophylaxis, ADE/SCD/ASA 81 mg BID  - Continue current pain control regimen, add flexeril TID MELODIE  - ID on board, on CTX, mildline ordered   - will pull drain today  - monitor intra-op cultures- NGTD  - supportive care    Dispo: Discussed with Dr. Mancia.  Midline ordered today, expect discharge in next couple days.       I spent 35 minutes in the  professional and overall care of this patient.      Casandra Ya PA-C

## 2024-09-12 NOTE — PROGRESS NOTES
"  INFECTIOUS DISEASE DAILY PROGRESS NOTE    SUBJECTIVE:    No overnight events. No new complaints. Afebrile. No rash/itching/diarrhea. Some knee pain but it is OK overall.    OBJECTIVE:  VITALS (Last 24 Hours)  /78 (BP Location: Right arm, Patient Position: Lying)   Pulse 90   Temp 36.8 °C (98.2 °F) (Temporal)   Resp 18   Ht 1.803 m (5' 11\")   Wt 95.3 kg (210 lb 1.6 oz)   SpO2 96%   BMI 29.30 kg/m²     PHYSICAL EXAM:  Gen - NAD, in bed  Lungs - no wh  Abd - soft, no ttp, BS present  MSK - left knee with surg dressing/drain  Skin - no rash    ABX: IV CTX    LABS:  Lab Results   Component Value Date    WBC 17.4 (H) 09/12/2024    HGB 11.5 (L) 09/12/2024    HCT 33.3 (L) 09/12/2024    MCV 79 (L) 09/12/2024     (H) 09/12/2024     Lab Results   Component Value Date    GLUCOSE 154 (H) 09/12/2024    CALCIUM 8.7 09/12/2024     09/12/2024    K 3.0 (L) 09/12/2024    CO2 25 09/12/2024     09/12/2024    BUN 13 09/12/2024    CREATININE 0.68 09/12/2024     Estimated Creatinine Clearance: 125 mL/min (by C-G formula based on SCr of 0.68 mg/dL).    ASSESSMENT/PLAN:    H. Flu Bacteremia - unclear original source of infection but is improving  Left Knee Native Joint Septic Arthritis - ortho evaluated, s/p arthrocentesis 9/9 with 100cc cloudy fluid. 26K WBCs, negative crystal exam. S/p OR 9/10 for washout.  Exophytic Left Kidney Lesion - looks like a hemorrhagic or proteinaceous cyst on MRI  Leukocytosis - reactive to OR and Decadron now     IV CTX 2g Q24H plan through 10/8/24.    OK for midline - will order.    On discharge - once weekly labs CBC/diff, CMP, ESR, CRP fax to Dr. Sidhu [phone].    Monitoring for adverse effects of abx such as rash/itching - none.     Will follow. Thanks! D/w case management.    Yifan Sidhu MD  [facility]  Office #[phone]      "

## 2024-09-12 NOTE — PROGRESS NOTES
Physical Therapy    Physical Therapy Evaluation & Treatment    Patient Name: aGbe Linder Jr.  MRN: [id number]  Today's Date: 9/12/2024   Time Calculation  Start Time: 1252  Stop Time: 1323  Time Calculation (min): 31 min  522/522-A    Assessment/Plan   PT Assessment  PT Assessment Results: Decreased strength, Decreased endurance, Decreased range of motion, Impaired balance, Decreased mobility, Decreased safety awareness, Pain  Rehab Prognosis: Good  End of Session Communication: Bedside nurse  Assessment Comment: Pt presents with decreased strength, balance, bed mobility, transfers and gait.  Pt requires 24 hour hands on assist for bed mobility, transfers and gait.  Pt would benefit from continued, high intensity physical therapy to maximize functional independence.  Pt is motivated and would be able to tolerate 3 hours of therapy, 5 days/wk.  PT will continue to follow pt during this hospital stay.  End of Session Patient Position:  (Pt seated at the edge of the bed with OT and RN at bedside.)  IP OR SWING BED PT PLAN  Inpatient or Swing Bed: Inpatient  PT Plan  Treatment/Interventions: Bed mobility, Transfer training, Gait training, Balance training, Strengthening, Endurance training, Range of motion, Therapeutic exercise, Therapeutic activity (Pt education)  PT Plan: Ongoing PT  PT Frequency: 5 times per week  PT Discharge Recommendations: High intensity level of continued care  PT Recommended Transfer Status: Assist x1, Assistive device  PT - OK to Discharge:  OK to discharge from acute PT services to the next level of care when cleared by the medical team.    Current Problem:  Patient Active Problem List   Diagnosis    Sepsis (Multi)    Bacterial infection of knee joint (Multi)       Subjective     General Visit Information:  General  Reason for Referral: Dificulty walking Per EMR: Gabe Linder Jr. is a 39 y.o. male presenting with 4 days worsening L knee pain in setting of H flu sepsis. Patient reports  malaise since admission found to have above. General body aches resolved with broad spectrum abx but patient reports continued localized L knee pain and inability to bear weight. No prior trauma. Also found to have newly diagnosed DM during this hospital visit w Hgb A1c 9.1.  9/10/24 s/p I&D L knee.  Referred By: Dr. Monroy  Co-Treatment: OT  Co-Treatment Reason: Co-treatment to maximize functional independence and for safety.  Prior to Session Communication: Bedside nurse  Patient Position Received: Bed, 3 rail up, Alarm off, not on at start of session    Home Living:  Home Living  Home Living Comments: Pt resides in an apartment with his son (21 years old), no stairs, elevator access, tub/shower.    Prior Level of Function:  Prior Function Per Pt/Caregiver Report  Prior Function Comments: Pt reports he was independent with gait, ADLs, IADLs, exercised at the gym.    Precautions:  Precautions  LE Weight Bearing Status: Weight Bearing as Tolerated  Medical Precautions: Fall precautions  Precautions Comment: NEREYDA drain, PICC    Objective     Pain:  Pain Assessment  Pain Assessment: 0-10  0-10 (Numeric) Pain Score: 8  Pain Type: Acute pain, Surgical pain  Pain Location: Knee  Pain Orientation: Left  Pain Interventions: Medication (See MAR)    Cognition:  Cognition  Overall Cognitive Status: Within Functional Limits    General Assessments:      Activity Tolerance  Endurance:  (activity tolerance limited by elevated pain levels.)     Strength  Strength Comments: R LE WFL, L LE not tested due to pain.                   Functional Assessments:     Bed Mobility  Bed Mobility:  (supine to sit SBA with head of bed elevated, pt moves L LE off the edge of the bed with his UE's.)  Transfers  Transfer:  (sit<>stand min assist with cues for safe hand placement with walker.)  Ambulation/Gait Training  Ambulation/Gait Training Performed:  (10 feet with front wheeled walker and min assist, cues for upright posture, cues for WBAT R LE  as pt avoids putting R foot on the floor due to pain, cues for gait pattern with walker, cues to avoid pivoting with change in direction.)          Extremity/Trunk Assessments:        RLE   RLE :  (R LE ROM WFL)  LLE   LLE :  (L knee ROM decreased but not formally tested due to pain.)    Treatments:           Bed Mobility  Bed Mobility:  (supine to sit SBA with head of bed elevated, pt moves L LE off the edge of the bed with his UE's.)  Ambulation/Gait Training  Ambulation/Gait Training Performed:  (10 feet with front wheeled walker and min assist, cues for upright posture, cues for WBAT R LE as pt avoids putting R foot on the floor due to pain, cues for gait pattern with walker, cues to avoid pivoting with change in direction.)  Transfers  Transfer:  (sit<>stand min assist with cues for safe hand placement with walker.)       Outcome Measures:  [facility] Basic Mobility  Turning from your back to your side while in a flat bed without using bedrails: A little  Moving from lying on your back to sitting on the side of a flat bed without using bedrails: A little  Moving to and from bed to chair (including a wheelchair): A little  Standing up from a chair using your arms (e.g. wheelchair or bedside chair): A little  To walk in hospital room: A little  Climbing 3-5 steps with railing: A lot  Basic Mobility - Total Score: 17                            Goals:  Encounter Problems       Encounter Problems (Active)       PT Problem       PT Goal 1 (Progressing)       Start:  09/12/24    Expected End:  09/26/24       Pt able to perform bed mobility modified independent.           PT Goal 2 (Progressing)       Start:  09/12/24    Expected End:  09/26/24       Pt able to complete all transfers with SBA.            PT Goal 3 (Progressing)       Start:  09/12/24    Expected End:  09/26/24       Pt able to ambulate 50 feet with front wheeled walker and SBA.              PT Problem       Patient will improve L knee AROM to 0-90 degrees  to normalize gait pattern.        PT Goal 4 (Progressing)       Start:  09/12/24    Expected End:  09/26/24                 Education Documentation  Precautions, taught by Karol Martinez, PT at 9/12/2024  2:05 PM.  Learner: Patient  Readiness: Acceptance  Method: Explanation  Response: Needs Reinforcement    Mobility Training, taught by Karol Martinez, PT at 9/12/2024  2:05 PM.  Learner: Patient  Readiness: Acceptance  Method: Explanation  Response: Needs Reinforcement    Education Comments  No comments found.

## 2024-09-12 NOTE — PROGRESS NOTES
Occupational Therapy    Evaluation    Patient Name: Gabe Linder Jr.  MRN: [id number]  Department: [facility]  Room: [address] [place] [address]  Today's Date: 9/12/2024  Time Calculation  Start Time: 1258  Stop Time: 1323  Time Calculation (min): 25 min        Assessment:  OT Assessment: Pt limited by pain this date (RN aware). Pt presenting with deficits in functional ambulation, strength, LE ROM, and dynamic standing balance impairing ability to independently complete ADLs. Pt noted to display difficulty coping and provided active listening/coping strategies. Pt educated in pain management strategies to increase participation in ADLs. Pt requiring skilled OT in order to return to [facility].  Prognosis: Good  Barriers to Discharge:  (pain)  Evaluation/Treatment Tolerance: Patient limited by pain  Medical Staff Made Aware: Yes  End of Session Communication: Bedside nurse  End of Session Patient Position: Bed, 2 rail up (Pt sitting EOB)  OT Assessment Results: Decreased ADL status, Decreased safe judgment during ADL, Decreased endurance, Decreased IADLs  Prognosis: Good  Barriers to Discharge:  (pain)  Evaluation/Treatment Tolerance: Patient limited by pain  Medical Staff Made Aware: Yes  Strengths: Ability to acquire knowledge, Access to adaptive/assistive products, Attitude of self, Capable of completing ADLs semi/independent, Coping skills, Housing layout, Insight into problems  Barriers to Participation: Other (Comment) (none)  Plan:  Treatment Interventions: ADL retraining, Equipment evaluation/education, Compensatory technique education, Functional transfer training, UE strengthening/ROM, Endurance training  OT Frequency: 3 times per week  OT Discharge Recommendations: High intensity level of continued care  Equipment Recommended upon Discharge: Wheeled walker (Reacher; to be assessed)  OT Recommended Transfer Status: Minimal assist, Assist of 1  OT - OK to Discharge: Yes  Treatment Interventions: ADL retraining, Equipment  evaluation/education, Compensatory technique education, Functional transfer training, UE strengthening/ROM, Endurance training    Subjective   Current Problem:  1. Sepsis (Multi)  Referral to Home Care      2. RUPERTO (acute kidney injury) (CMS-HCC)        3. Bandemia        4. Diarrhea, unspecified type        5. Pedal edema  Lower extremity venous duplex bilateral    Lower extremity venous duplex bilateral      6. Other specified diabetes mellitus with other specified complication, without long-term current use of insulin (Multi)  lancets 33 gauge misc    blood-glucose meter misc    blood sugar diagnostic strip    alcohol swabs pads, medicated      7. Bacterial infection of knee joint (Multi)  Case Request Operating Room: Debridement Knee    Case Request Operating Room: Debridement Knee    Fungal Culture/Smear    Fungal Culture/Smear    Tissue/Wound Culture/Smear    Tissue/Wound Culture/Smear    Referral to Home Care    cefTRIAXone (Rocephin) 2 gram/50 mL IV    left knee      8. Bacterial infection of knee joint (Multi)  Case Request Operating Room: Debridement Knee    Case Request Operating Room: Debridement Knee    Fungal Culture/Smear    Fungal Culture/Smear    Tissue/Wound Culture/Smear    Tissue/Wound Culture/Smear    Referral to Home Care    cefTRIAXone (Rocephin) 2 gram/50 mL IV        General:  General  Reason for Referral: Dificulty walking;  s/p left Knee I&D with Dr Mancia  Referred By: Dr. Monroy  Past Medical History Relevant to Rehab:   Past Medical History:   Diagnosis Date    Arthritis, septic (Multi)     ETOH abuse     GERD (gastroesophageal reflux disease)     Haemophilus influenzae infection     HTN (hypertension)     Type 2 diabetes mellitus (Multi)        Co-Treatment: PT  Co-Treatment Reason: Co evaluation to maximize safety and pt participation  Prior to Session Communication: Bedside nurse  Patient Position Received: Bed, 3 rail up, Alarm off, not on at start of session  Preferred Learning  Style: auditory, kinesthetic, written  General Comment: Pt agreeable to evaluation  Precautions:  LE Weight Bearing Status: Weight Bearing as Tolerated (L LE)  Medical Precautions: Fall precautions  Precautions Comment: NEREYDA drain, PICC    Pain:  Pain Assessment  Pain Assessment: 0-10  0-10 (Numeric) Pain Score: 8  Pain Type: Acute pain  Pain Location:  (Left leg)  Pain Descriptors: Aching, Burning  Pain Frequency: Constant/continuous  Pain Onset: Gradual  Clinical Progression: Not changed  Effect of Pain on Daily Activities: OT to tolerance  Pain Interventions: Repositioned    Objective   Cognition:  Overall Cognitive Status: Within Functional Limits     Home Living:  Type of Home: Apartment  Lives With: Other (Comment) (son)  Home Adaptive Equipment: None  Home Layout: One level  Home Access: Elevator  Bathroom Shower/Tub: Tub/shower unit  Bathroom Toilet: Standard  Bathroom Equipment: Grab bars in shower  Prior Function:  Level of Coleman: Independent with ADLs and functional transfers, Independent with homemaking with ambulation  ADL Assistance: Independent  Homemaking Assistance: Independent  Ambulatory Assistance: Independent  Prior Function Comments: Pt reports he was independent with gait, ADLs, IADLs, exercised at the gym.    ADL:  LE Dressing Assistance: Maximal  LE Dressing Deficit: Don/doff L sock  Activity Tolerance:  Endurance: Tolerates less than 10 min exercise with changes in vital signs  Activity Tolerance Comments: pt limited by pain  Bed Mobility/Transfers: Bed Mobility  Bed Mobility: Yes  Bed Mobility 1  Bed Mobility 1: Supine to sitting  Level of Assistance 1: Close supervision    Transfers  Transfer: Yes  Transfer 1  Technique 1: Sit to stand, Stand to sit  Transfer Device 1: Gait belt, Walker  Transfer Level of Assistance 1: Minimum assistance  Trials/Comments 1: CGA-Min A; pt unable to bear weight fully through L LE due to pain, B UE support on FWW     Vision:Vision - Basic  Assessment  Current Vision: No visual deficits  Sensation:  Light Touch: No apparent deficits  Strength:  Strength Comments: B UE's 4/5 MMT  Perception:     Coordination:  Movements are Fluid and Coordinated: Yes   Hand Function:  Gross Grasp: Functional  Coordination: Functional  Extremities: RUE   RUE : Within Functional Limits and LUE   LUE: Within Functional Limits    Outcome Measures:[facility] Daily Activity  Putting on and taking off regular lower body clothing: A lot  Bathing (including washing, rinsing, drying): A lot  Putting on and taking off regular upper body clothing: A little  Toileting, which includes using toilet, bedpan or urinal: A little  Taking care of personal grooming such as brushing teeth: A little  Eating Meals: None  Daily Activity - Total Score: 17    Education Documentation  Body Mechanics, taught by Kathryn Mcintosh OT at 9/12/2024  2:39 PM.  Learner: Patient  Readiness: Acceptance  Method: Explanation, Demonstration  Response: Needs Reinforcement    Precautions, taught by Kathryn Mcintosh OT at 9/12/2024  2:39 PM.  Learner: Patient  Readiness: Acceptance  Method: Explanation, Demonstration  Response: Needs Reinforcement    ADL Training, taught by Kathryn Mcintosh OT at 9/12/2024  2:39 PM.  Learner: Patient  Readiness: Acceptance  Method: Explanation, Demonstration  Response: Needs Reinforcement    Education Comments  No comments found.             Goals:  Encounter Problems       Encounter Problems (Active)       ADLs       Patient will perform UB and LB bathing with modified independent level of assistance.       Start:  09/12/24            Patient with complete upper body dressing with independent level of assistance donning and doffing all UE clothes with no adaptive equipment.       Start:  09/12/24            Patient with complete lower body dressing with modified independent level of assistance donning and doffing all LE clothes  with PRN adaptive equipment.       Start:  09/12/24             Patient will complete daily grooming tasks brushing teeth and washing face/hair with independent level of assistance and PRN adaptive equipment.       Start:  09/12/24            Patient will complete toileting including hygiene clothing management/hygiene with independent level of assistance.       Start:  09/12/24               BALANCE       Pt will maintain dynamic standing balance during ADL task with modified independent level of assistance in order to demonstrate decreased risk of falling and improved postural control.       Start:  09/12/24               MOBILITY       Patient will perform Functional mobility mod  Household distances/Community Distances with modified independent level of assistance and least restrictive device in order to improve safety and functional mobility.       Start:  09/12/24               TRANSFERS       Patient will complete sit to stand transfer with independent level of assistance and least restrictive device in order to improve safety and prepare for out of bed mobility.       Start:  09/12/24

## 2024-09-12 NOTE — PROGRESS NOTES
REVIEWED PT INFO AS CORRECT.   DX...  SEPTIC KNEE ARTHRITIS  REVIEWED ALLERGIES... NKDA  PMH...NONE  NO MEDICATION INTERACTIONS.  REVIEWED RELEVANT BASELINE VALUES  LAB ARE ... WEEKLY CBC/D, CMP, ESR, CRP TO DR. MURRAY  PT HAS …..  MIDLINE ...  FLUSH PER [facility] PROTOCOL.  CONTINUE MED THRU TENTATIVE STOP DATE …. 10/8/24  MED PLACED … MB+  CARE PLAN DONE TODAY    PAITENT BEING DISCHARGED ON IV CEFTRIAXONE FOR SEPTIC KNEE ARTHRITIS HEVER 10/8.  LESLIE MURRAY TO FOLLOW AT HOME.  SL MIDLINE FOR THERAPY.  REVIEW OF HOMEGOING MEDS AND NO INTERACTIONS OR DUPLICATIONS NOTED.  GOAL OF THERAPY IS TO RESOLVE INFECTION WHILE MONITORING FOR SIDE EFFECTS AS LISTED IN THE CP.  PROCESSED FILL FOR 7 CEFTRIAXONE FOR MIX AND DEL. FRI.  DOS 9/1 THRU 9/20.  NF FOR 9/19 FOR OVN DEL. DSL

## 2024-09-12 NOTE — CARE PLAN
Problem: Fall/Injury  Goal: Not fall by end of shift  Outcome: Progressing  Goal: Be free from injury by end of the shift  Outcome: Progressing  Goal: Verbalize understanding of personal risk factors for fall in the hospital  Outcome: Progressing  Goal: Verbalize understanding of risk factor reduction measures to prevent injury from fall in the home  Outcome: Progressing  Goal: Use assistive devices by end of the shift  Outcome: Progressing  Goal: Pace activities to prevent fatigue by end of the shift  Outcome: Progressing   The patient's goals for the shift include pain management     The clinical goals for the shift include maintaining pt safety and comfort

## 2024-09-12 NOTE — PROGRESS NOTES
Physical Therapy                 Therapy Communication Note    Patient Name: Gabe Linder Jr.  MRN: [id number]  Department: [facility]  Room: [facility]  Today's Date: 9/12/2024     Discipline: Physical Therapy    Missed Visit Reason: Patient refused.  Attempted to see pt for PT eval.  Pt was sleeping upon therapist entering the room and reports he is tired from pain medication and request therapist to return at a later time.  Will attempt again later as appropriate.    Missed Time: Attempt

## 2024-09-13 LAB
ANION GAP SERPL CALC-SCNC: 14 MMOL/L (ref 10–20)
BACTERIA FLD CULT: NORMAL
BUN SERPL-MCNC: 9 MG/DL (ref 6–23)
CALCIUM SERPL-MCNC: 9 MG/DL (ref 8.6–10.3)
CHLORIDE SERPL-SCNC: 97 MMOL/L (ref 98–107)
CO2 SERPL-SCNC: 29 MMOL/L (ref 21–32)
CREAT SERPL-MCNC: 0.72 MG/DL (ref 0.5–1.3)
EGFRCR SERPLBLD CKD-EPI 2021: >90 ML/MIN/1.73M*2
ERYTHROCYTE [DISTWIDTH] IN BLOOD BY AUTOMATED COUNT: 12.9 % (ref 11.5–14.5)
FUNGUS SPEC CULT: NORMAL
FUNGUS SPEC FUNGUS STN: NORMAL
GLUCOSE BLD MANUAL STRIP-MCNC: 151 MG/DL (ref 74–99)
GLUCOSE BLD MANUAL STRIP-MCNC: 160 MG/DL (ref 74–99)
GLUCOSE BLD MANUAL STRIP-MCNC: 180 MG/DL (ref 74–99)
GLUCOSE BLD MANUAL STRIP-MCNC: 215 MG/DL (ref 74–99)
GLUCOSE SERPL-MCNC: 137 MG/DL (ref 74–99)
GRAM STN SPEC: NORMAL
GRAM STN SPEC: NORMAL
HCT VFR BLD AUTO: 36.8 % (ref 41–52)
HGB BLD-MCNC: 12.5 G/DL (ref 13.5–17.5)
MAGNESIUM SERPL-MCNC: 1.7 MG/DL (ref 1.6–2.4)
MCH RBC QN AUTO: 26.8 PG (ref 26–34)
MCHC RBC AUTO-ENTMCNC: 34 G/DL (ref 32–36)
MCV RBC AUTO: 79 FL (ref 80–100)
NRBC BLD-RTO: 0 /100 WBCS (ref 0–0)
PLATELET # BLD AUTO: 544 X10*3/UL (ref 150–450)
POTASSIUM SERPL-SCNC: 2.9 MMOL/L (ref 3.5–5.3)
POTASSIUM SERPL-SCNC: 3.4 MMOL/L (ref 3.5–5.3)
RBC # BLD AUTO: 4.66 X10*6/UL (ref 4.5–5.9)
SODIUM SERPL-SCNC: 137 MMOL/L (ref 136–145)
WBC # BLD AUTO: 17.2 X10*3/UL (ref 4.4–11.3)

## 2024-09-13 PROCEDURE — 99232 SBSQ HOSP IP/OBS MODERATE 35: CPT | Performed by: STUDENT IN AN ORGANIZED HEALTH CARE EDUCATION/TRAINING PROGRAM

## 2024-09-13 PROCEDURE — 2500000004 HC RX 250 GENERAL PHARMACY W/ HCPCS (ALT 636 FOR OP/ED): Performed by: NURSE PRACTITIONER

## 2024-09-13 PROCEDURE — 2500000001 HC RX 250 WO HCPCS SELF ADMINISTERED DRUGS (ALT 637 FOR MEDICARE OP): Performed by: NURSE PRACTITIONER

## 2024-09-13 PROCEDURE — 85027 COMPLETE CBC AUTOMATED: CPT

## 2024-09-13 PROCEDURE — 1100000001 HC PRIVATE ROOM DAILY

## 2024-09-13 PROCEDURE — 83735 ASSAY OF MAGNESIUM: CPT | Performed by: NURSE PRACTITIONER

## 2024-09-13 PROCEDURE — 97530 THERAPEUTIC ACTIVITIES: CPT | Mod: GP,CQ

## 2024-09-13 PROCEDURE — 82947 ASSAY GLUCOSE BLOOD QUANT: CPT

## 2024-09-13 PROCEDURE — 99232 SBSQ HOSP IP/OBS MODERATE 35: CPT | Performed by: INTERNAL MEDICINE

## 2024-09-13 PROCEDURE — 2500000002 HC RX 250 W HCPCS SELF ADMINISTERED DRUGS (ALT 637 FOR MEDICARE OP, ALT 636 FOR OP/ED): Performed by: INTERNAL MEDICINE

## 2024-09-13 PROCEDURE — 2500000001 HC RX 250 WO HCPCS SELF ADMINISTERED DRUGS (ALT 637 FOR MEDICARE OP): Performed by: INTERNAL MEDICINE

## 2024-09-13 PROCEDURE — 80048 BASIC METABOLIC PNL TOTAL CA: CPT

## 2024-09-13 PROCEDURE — 97116 GAIT TRAINING THERAPY: CPT | Mod: GP,CQ

## 2024-09-13 PROCEDURE — 84132 ASSAY OF SERUM POTASSIUM: CPT | Performed by: NURSE PRACTITIONER

## 2024-09-13 PROCEDURE — 2500000002 HC RX 250 W HCPCS SELF ADMINISTERED DRUGS (ALT 637 FOR MEDICARE OP, ALT 636 FOR OP/ED): Performed by: NURSE PRACTITIONER

## 2024-09-13 PROCEDURE — 2500000001 HC RX 250 WO HCPCS SELF ADMINISTERED DRUGS (ALT 637 FOR MEDICARE OP): Performed by: STUDENT IN AN ORGANIZED HEALTH CARE EDUCATION/TRAINING PROGRAM

## 2024-09-13 RX ORDER — LOPERAMIDE HYDROCHLORIDE 2 MG/1
2 CAPSULE ORAL 4 TIMES DAILY PRN
Start: 2024-09-13

## 2024-09-13 RX ORDER — LISINOPRIL 20 MG/1
20 TABLET ORAL DAILY
Status: DISCONTINUED | OUTPATIENT
Start: 2024-09-14 | End: 2024-09-17 | Stop reason: HOSPADM

## 2024-09-13 RX ORDER — OXYCODONE HYDROCHLORIDE 5 MG/1
10 TABLET ORAL EVERY 4 HOURS PRN
Status: DISCONTINUED | OUTPATIENT
Start: 2024-09-13 | End: 2024-09-17 | Stop reason: HOSPADM

## 2024-09-13 RX ORDER — POTASSIUM CHLORIDE 20 MEQ/1
40 TABLET, EXTENDED RELEASE ORAL ONCE
Status: COMPLETED | OUTPATIENT
Start: 2024-09-13 | End: 2024-09-13

## 2024-09-13 RX ORDER — LISINOPRIL 20 MG/1
20 TABLET ORAL DAILY
Start: 2024-09-14

## 2024-09-13 RX ORDER — OXYCODONE HYDROCHLORIDE 5 MG/1
5 TABLET ORAL EVERY 4 HOURS PRN
Start: 2024-09-13

## 2024-09-13 RX ORDER — AMLODIPINE BESYLATE 10 MG/1
10 TABLET ORAL DAILY
Start: 2024-09-13 | End: 2024-10-13

## 2024-09-13 RX ORDER — ONDANSETRON 4 MG/1
4 TABLET, ORALLY DISINTEGRATING ORAL EVERY 8 HOURS PRN
Qty: 20 TABLET | Refills: 0 | Status: SHIPPED | OUTPATIENT
Start: 2024-09-13 | End: 2024-10-13

## 2024-09-13 RX ORDER — METFORMIN HYDROCHLORIDE 500 MG/1
500 TABLET ORAL
Start: 2024-09-13 | End: 2024-10-13

## 2024-09-13 RX ORDER — NAPROXEN SODIUM 220 MG/1
81 TABLET, FILM COATED ORAL 2 TIMES DAILY
Qty: 60 TABLET | Refills: 0 | Status: SHIPPED | OUTPATIENT
Start: 2024-09-13 | End: 2024-10-13

## 2024-09-13 RX ORDER — CYCLOBENZAPRINE HCL 5 MG
5 TABLET ORAL 3 TIMES DAILY
Qty: 90 TABLET | Refills: 0 | Status: SHIPPED | OUTPATIENT
Start: 2024-09-13 | End: 2024-10-13

## 2024-09-13 RX ORDER — OXYCODONE HYDROCHLORIDE 5 MG/1
5 TABLET ORAL EVERY 4 HOURS PRN
Status: DISCONTINUED | OUTPATIENT
Start: 2024-09-13 | End: 2024-09-17 | Stop reason: HOSPADM

## 2024-09-13 RX ORDER — POTASSIUM CHLORIDE 14.9 MG/ML
20 INJECTION INTRAVENOUS
Status: COMPLETED | OUTPATIENT
Start: 2024-09-13 | End: 2024-09-13

## 2024-09-13 RX ORDER — ACETAMINOPHEN 325 MG/1
650 TABLET ORAL EVERY 4 HOURS PRN
Start: 2024-09-13

## 2024-09-13 RX ORDER — LOPERAMIDE HYDROCHLORIDE 2 MG/1
2 CAPSULE ORAL 4 TIMES DAILY PRN
Status: DISCONTINUED | OUTPATIENT
Start: 2024-09-13 | End: 2024-09-17 | Stop reason: HOSPADM

## 2024-09-13 ASSESSMENT — PAIN DESCRIPTION - ORIENTATION
ORIENTATION: LEFT

## 2024-09-13 ASSESSMENT — PAIN SCALES - GENERAL
PAINLEVEL_OUTOF10: 8
PAINLEVEL_OUTOF10: 6
PAINLEVEL_OUTOF10: 8

## 2024-09-13 ASSESSMENT — PAIN SCALES - WONG BAKER
WONGBAKER_NUMERICALRESPONSE: NO HURT
WONGBAKER_NUMERICALRESPONSE: HURTS WORST
WONGBAKER_NUMERICALRESPONSE: NO HURT
WONGBAKER_NUMERICALRESPONSE: NO HURT

## 2024-09-13 ASSESSMENT — PAIN SCALES - PAIN ASSESSMENT IN ADVANCED DEMENTIA (PAINAD)
TOTALSCORE: 0
BREATHING: NORMAL
FACIALEXPRESSION: SMILING OR INEXPRESSIVE
BODYLANGUAGE: RELAXED
CONSOLABILITY: NO NEED TO CONSOLE

## 2024-09-13 ASSESSMENT — COGNITIVE AND FUNCTIONAL STATUS - GENERAL
WALKING IN HOSPITAL ROOM: A LOT
DAILY ACTIVITIY SCORE: 24
MOBILITY SCORE: 24
CLIMB 3 TO 5 STEPS WITH RAILING: TOTAL
TURNING FROM BACK TO SIDE WHILE IN FLAT BAD: A LITTLE
MOVING FROM LYING ON BACK TO SITTING ON SIDE OF FLAT BED WITH BEDRAILS: A LITTLE
MOBILITY SCORE: 15
STANDING UP FROM CHAIR USING ARMS: A LITTLE
MOVING TO AND FROM BED TO CHAIR: A LITTLE

## 2024-09-13 ASSESSMENT — PAIN INTENSITY VAS: VAS_PAIN_BASICVITALS_IP: 0

## 2024-09-13 ASSESSMENT — PAIN DESCRIPTION - LOCATION
LOCATION: LEG
LOCATION: KNEE

## 2024-09-13 ASSESSMENT — PAIN - FUNCTIONAL ASSESSMENT: PAIN_FUNCTIONAL_ASSESSMENT: 0-10

## 2024-09-13 NOTE — PROGRESS NOTES
Physical Therapy    Physical Therapy Treatment    Patient Name: Gabe Linder Jr.  MRN: [id number]  Department: [place] [address]  Room: [address] [place] [address]  Today's Date: 9/13/2024  Time Calculation  Start Time: 1328  Stop Time: 1351  Time Calculation (min): 23 min         Assessment/Plan   PT Assessment  End of Session Communication: Bedside nurse  Assessment Comment: able to walk very limited distance at this time  End of Session Patient Position: Alarm on, Bed, 3 rail up  PT Plan  Inpatient/Swing Bed or Outpatient: Inpatient  PT Plan  Treatment/Interventions: Bed mobility, Transfer training, Gait training  PT Plan: Ongoing PT  PT Frequency: 5 times per week  PT Discharge Recommendations: High intensity level of continued care  PT Recommended Transfer Status: Assist x1  PT - OK to Discharge: Yes (per POC)      General Visit Information:   PT  Visit  PT Received On: 09/13/24  General  Reason for Referral: Dificulty walking;  s/p left Knee I&D with Dr Mancia  Referred By: Dr. Monroy  Prior to Session Communication: Bedside nurse  Patient Position Received: Bed, 3 rail up, Alarm off, not on at start of session  Preferred Learning Style: auditory, kinesthetic, written  General Comment: pt agreeable to tx, increased time req for all mobility 2/2 pain.  shahid concerns with mobility at this time.    Subjective   Precautions:  Precautions  LE Weight Bearing Status: Weight Bearing as Tolerated  Medical Precautions: Fall precautions    Vital Signs (Past 2hrs)                 Objective   Pain:  Pain Assessment  0-10 (Numeric) Pain Score: 8 (increased with activity but not rated)  Pain Type: Acute pain  Pain Location: Knee  Pain Orientation: Left  Pain Interventions: Medication (See MAR)  Cognition:  Cognition  Overall Cognitive Status: Within Functional Limits  Coordination:     Postural Control:  Static Sitting Balance  Static Sitting-Comment/Number of Minutes: pt performed at EOB with SBA, no overt LOB  Static Standing Balance  Static  Standing-Comment/Number of Minutes: pt performed static standing balance with UE support at RW and  min assist,  x5 min. flexed forward VC for up right posture, unable to put much weight in LLE  Extremity/Trunk Assessments:    Activity Tolerance:     Treatments:            Bed Mobility  Bed Mobility: Yes  Bed Mobility 1  Bed Mobility 1: Supine to sitting, Sitting to supine  Level of Assistance 1: Close supervision  Bed Mobility Comments 1: pt uses BUE to bring LLE over EOB, slow to complete    Ambulation/Gait Training  Ambulation/Gait Training Performed: Yes  Ambulation/Gait Training 1  Surface 1: Level tile  Device 1: Rolling walker  Gait Support Devices: Gait belt  Assistance 1: Minimum assistance  Quality of Gait 1: Antalgic, Forward flexed posture  Comments/Distance (ft) 1: 5x2. (pt performed wtih very small steps, decreased height and length.  VC for up right posture.  instability noted throughout, heavy use of BUE to maintain balance.  very limited distance with safety concerns at this time 2/2 pain.)  Transfer 1  Technique 1: Sit to stand, Stand to sit  Transfer Device 1: Walker  Transfer Level of Assistance 1: Minimum assistance  Trials/Comments 1: vc/tc for hand placment on solid sitting surface and not RW. VC for LLE placement.  bed elevated to perform.    Outcome Measures:  [facility] Basic Mobility  Turning from your back to your side while in a flat bed without using bedrails: A little  Moving from lying on your back to sitting on the side of a flat bed without using bedrails: A little  Moving to and from bed to chair (including a wheelchair): A little  Standing up from a chair using your arms (e.g. wheelchair or bedside chair): A little  To walk in hospital room: A lot  Climbing 3-5 steps with railing: Total  Basic Mobility - Total Score: 15    Education Documentation  Mobility Training, taught by Darinel Fernandez PTA at 9/13/2024  2:02 PM.  Learner: Patient  Readiness: Acceptance  Method:  Explanation  Response: Verbalizes Understanding    Education Comments  No comments found.        OP EDUCATION:       Encounter Problems       Encounter Problems (Active)       PT Problem       PT Goal 1 (Progressing)       Start:  09/12/24    Expected End:  09/26/24       Pt able to perform bed mobility modified independent.           PT Goal 2 (Progressing)       Start:  09/12/24    Expected End:  09/26/24       Pt able to complete all transfers with SBA.            PT Goal 3 (Progressing)       Start:  09/12/24    Expected End:  09/26/24       Pt able to ambulate 50 feet with front wheeled walker and SBA.              PT Problem       Patient will improve L knee AROM to 0-90 degrees to normalize gait pattern.        PT Goal 4 (Progressing)       Start:  09/12/24    Expected End:  09/26/24

## 2024-09-13 NOTE — PROGRESS NOTES
Gabe Linder  is a 39 y.o. male     Patient agreeable to go to SNF  Struggling with therapy with severe pain in the knee    Review of Systems     Constitutional: Feeling poorly  Cardiovascular: no chest pain   Respiratory: no cough, wheezing or shortness of breath a  Gastrointestinal: no abdominal pain, no constipation, no melena, no nausea, no diarrhea, no vomiting and no blood in stools.   Neurological: no headache,   All other systems have been reviewed and are negative for complaint.       Vitals:    09/13/24 1511   BP: (!) 157/92   Pulse: 107   Resp: 18   Temp: 36.1 °C (97 °F)   SpO2: 97%        Scheduled medications  amLODIPine, 10 mg, oral, Daily  aspirin, 81 mg, oral, BID  cefTRIAXone, 2 g, intravenous, q24h  cyclobenzaprine, 5 mg, oral, TID  [Held by provider] docusate sodium, 100 mg, oral, BID  insulin lispro, 0-5 Units, subcutaneous, Before meals & nightly  lidocaine, 5 mL, infiltration, Once  [START ON 9/14/2024] lisinopril, 20 mg, oral, Daily  metFORMIN, 500 mg, oral, BID  pantoprazole, 40 mg, oral, Daily before breakfast   Or  pantoprazole, 40 mg, intravenous, Daily before breakfast  [Held by provider] polyethylene glycol, 17 g, oral, Daily      Continuous medications       PRN medications  PRN medications: acetaminophen **OR** acetaminophen **OR** acetaminophen, alum-mag hydroxide-simeth, guaiFENesin, hydrALAZINE, ketorolac, loperamide, melatonin, ondansetron **OR** ondansetron, oxyCODONE, oxyCODONE    Lab Review   Results from last 7 days   Lab Units 09/13/24  0458 09/12/24  0649 09/11/24  0511   WBC AUTO x10*3/uL 17.2* 17.4* 16.4*   HEMOGLOBIN g/dL 12.5* 11.5* 11.8*   HEMATOCRIT % 36.8* 33.3* 34.5*   PLATELETS AUTO x10*3/uL 544* 498* 418     Results from last 7 days   Lab Units 09/13/24  0458 09/12/24  0648 09/11/24  0511   SODIUM mmol/L 137 137 135*   POTASSIUM mmol/L 2.9* 3.0* 3.1*   CHLORIDE mmol/L 97* 100 97*   CO2 mmol/L 29 25 29   BUN mg/dL 9 13 11   CREATININE mg/dL 0.72 0.68 0.73    CALCIUM mg/dL 9.0 8.7 8.9   GLUCOSE mg/dL 137* 154* 158*              Bedside Midline Imaging   Final Result      XR chest 1 view   Final Result   No airspace consolidation or pleural effusion.        MACRO:   None        Signed by: Haja Johnson 9/10/2024 2:53 AM   Dictation workstation:   [id number]      XR knee left 3 views   Final Result   Large suprapatellar effusion.        No acute fracture or dislocation.        MACRO:   None        Signed by: Tyson Yu 9/10/2024 8:14 AM   Dictation workstation:   [id number]      CT thoracic spine w IV contrast   Final Result   No bony abnormality or soft tissue inflammation to suggest   osteomyelitis or discitis are noted.        No stenoses of the thoracic spine are noted.        MACRO:   None        Signed by: Regino Kurtz 9/9/2024 10:14 AM   Dictation workstation:   [id number]      CT lumbar spine w IV contrast   Final Result   Addendum (preliminary) 1 of 1   Interpreted By:  Regino Kurtz,    ADDENDUM:   Enhanced images of the lumbar spine were obtained with coronal and   sagittal reconstructions using 75 mL Omnipaque 350.        The paraspinous and spinal canal soft tissues enhance normally with   no abnormality suggesting infection noted.        Signed by: Regino Kurtz 9/9/2024 10:15 AM        -------- ORIGINAL REPORT --------   Dictation workstation:   [id number]      Final   No bony erosions or inflammatory soft tissue changes suggestive of   osteomyelitis/discitis in the lumbar spine are noted.        MACRO:   None        Signed by: Regino Kurtz 9/9/2024 10:11 AM   Dictation workstation:   [id number]      MR kidney wo IV contrast   Final Result   The questioned left renal lesion (2 cm) likely corresponds to a   hemorrhagic/proteinaceous cyst within limits of noncontrast enhanced   examination.        Hepatic steatosis. Hepatosplenomegaly.        MACRO   None        Signed by: Breana Pimentel 9/9/2024 8:11 AM   Dictation workstation:   [id number]       Lower extremity venous duplex bilateral   Final Result   No sonographic evidence for deep vein thrombosis within the evaluated   veins of the bilateral lower extremities.        MACRO:   None        Signed by: Jeffery Mendez 9/9/2024 5:52 AM   Dictation workstation:   [id number]      CT head wo IV contrast   Final Result   1. No acute intracranial abnormality identified.   2. Small chronic appearing defect along the left parietal calvarium   with overlying soft tissue density/scarring. Correlate for prior   procedure such as dallas hole at this site.             If there is concern for ongoing intracranial abnormality then MRI may   be performed for further follow-up.        MACRO:   None        Signed by: Sha Lombardo 9/5/2024 7:35 PM   Dictation workstation:   [id number]      CT chest abdomen pelvis wo IV contrast   Final Result   1. Hepatomegaly and hepatic steatosis.   2. Soft tissue attenuating exophytic lesion arising from the   posterior aspect of the left kidney, incompletely characterized on   this exam. Recommend follow-up renal mass CT or MRI for further   assessment.        MACRO:   None        Signed by: Sha Lombardo 9/5/2024 7:53 PM   Dictation workstation:   [id number]      XR chest 2 views   Final Result   No acute pathologic findings are identified on this exam.        MACRO:   none        Signed by: Gabe White 9/5/2024 4:56 PM   Dictation workstation:   [id number]            Physical Exam    Constitutional   General appearance: Alert   Pulmonary   Respiratory assessment: No respiratory distress, normal respiratory rhythm and effort.    Auscultation of Lungs: Clear bilateral breath sounds.   Cardiovascular   Auscultation of heart: Apical pulse normal, heart rate and rhythm normal, normal S1 and S2, no murmurs and no pericardial rub.    Exam for edema: No peripheral edema.   Abdomen   Abdominal Exam: No bruits, normal bowel sounds, soft, non-tender, no abdominal mass palpated.    Liver and  Spleen exam: No hepato-splenomegaly.   Musculoskeletal   Significant tenderness in the left lower extremity with some swelling  Neurologic   Cranial nerves: Nerves 2-12 were intact, no focal neuro defects.         Assessment/Plan      #Gram-negative bacteremia  Septic arthritis of left knee  S/p I&D  Continue IV antibiotics  Will need 4 weeks of IV antibiotics    #Uncontrolled hypertension  Increase lisinopril    #Hypokalemia  Replenish    #Diarrhea  Stool cultures negative      #New onset diabetes  20 metformin          #Generalized myalgias and arthralgias  Elevated CRP RP and ESR noted  Likely brought on by the sepsis

## 2024-09-13 NOTE — PROGRESS NOTES
09/13/24 1208   Current Planned Discharge Disposition   Current Planned Discharge Disposition SNF     TCC met with pt at bedside. Pt stated he can not dc home if he can't walk. Pt wants [facility] as [facility] choice. Pt is agreeable SNF placement if needed.    TCC asked [facility] to start auth.

## 2024-09-13 NOTE — PROGRESS NOTES
Gabe Linder Jr. is a 39 y.o. male on day 8 of admission presenting with Sepsis (Multi).      Subjective   RAMONE. Flexeril helped pain. Still endorsing a lot of difficulty with standing/ambulating     .  Output by Drain (mL) 09/11/24 0700 - 09/11/24 1859 09/11/24 1900 - 09/12/24 0659 09/12/24 0700 - 09/12/24 1859 09/12/24 1900 - 09/13/24 0659 09/13/24 0700 - 09/13/24 0847   Closed/Suction Drain 1 Left;Anterior Knee Bulb 10 Fr.   20          Objective     PE:  Constitutional: A&Ox3, calm and cooperative, NAD  Eyes: EOMI, clear sclera   Cardiovascular: Normal rate and regular rhythm. No murmurs  Respiratory/Thorax: CTAB, on RA  Genitourinary: Voiding independently   Musculoskeletal: left knee mepilex CDI w/o surrounding erythema or drainage, prior drain site covered with CDI dressing.  2+ DP/PT, <2 CRT. Compartments soft, compressible. ACE in place  Extremities: No peripheral edema  Neurological: A&Ox3, No focal deficits   Psychological: Appropriate mood and behavior            Last Recorded Vitals  Vitals:    09/12/24 1935 09/13/24 0421 09/13/24 0641 09/13/24 0759   BP: (!) 164/96 (!) 150/92 (!) 158/92 (!) 160/91   BP Location: Left arm Left arm     Patient Position: Lying Lying  Lying   Pulse: 105 95 98 102   Resp: 18 18  18   Temp: 36.6 °C (97.9 °F) 36.9 °C (98.5 °F)  36.3 °C (97.4 °F)   TempSrc: Oral Temporal     SpO2: 97% 96%  99%   Weight:       Height:             Relevant Results    Imaging:     .=== 09/05/24 ===    XR CHEST 1 VIEW    - Impression -  No airspace consolidation or pleural effusion.    MACRO:  None    Signed by: Haja Johnson 9/10/2024 2:53 AM  Dictation workstation:   [id number]   .=== 09/05/24 ===    CT LUMBAR SPINE W IV CONTRAST    Addendum 9/9/2024 10:15 AM -------------------------------------------------  Interpreted By:  Regino Kurtz,  ADDENDUM:  Enhanced images of the lumbar spine were obtained with coronal and  sagittal reconstructions using 75 mL Omnipaque 350.    The paraspinous and  spinal canal soft tissues enhance normally with  no abnormality suggesting infection noted.    Signed by: Regino Kurtz 9/9/2024 10:15 AM    -------- ORIGINAL REPORT --------  Dictation workstation:   [id number]    - Impression -  No bony erosions or inflammatory soft tissue changes suggestive of  osteomyelitis/discitis in the lumbar spine are noted.    MACRO:  None    Signed by: Regino Kurtz 9/9/2024 10:11 AM  Dictation workstation:   [id number]         Lab Results:   Lab Results   Component Value Date    WBC 17.2 (H) 09/13/2024    HGB 12.5 (L) 09/13/2024    HCT 36.8 (L) 09/13/2024    MCV 79 (L) 09/13/2024     (H) 09/13/2024     Lab Results   Component Value Date    GLUCOSE 137 (H) 09/13/2024    CALCIUM 9.0 09/13/2024     09/13/2024    K 2.9 (LL) 09/13/2024    CO2 29 09/13/2024    CL 97 (L) 09/13/2024    BUN 9 09/13/2024    CREATININE 0.72 09/13/2024         Estimated Creatinine Clearance: 125 mL/min (by C-G formula based on SCr of 0.72 mg/dL).  C-Reactive Protein   Date/Time Value Ref Range Status   09/09/2024 05:36 AM 18.33 (H) <1.00 mg/dL Final         Assessment/Plan   Gabe Linder Jr is a 40 yo male who is admitted with bacterial H.influenza. He developed Left knee pain and swelling and underwent aspiration with concern for septic arthritis.     now POD #3  left Knee I&D with Dr Mancia  Intra-op findings : left knee cloudy synovial fluid concerning for septic arthritis.   Intra-op cultures: NGTD      A/P:  - Afebrile, VSS, hypertensive  - WBAT with FWW  - PT/OOB/ mobilize  - discontinue bowel regimen due to diarrhea  - DVT prophylaxis, ADE/SCD/ASA 81 mg BID  - Continue current pain control regimen, continue flexeril TID MELODIE  - ID on board, on CTX, mildline placed 9/12  - monitor intra-op cultures  - supportive care    Dispo: Discussed with Dr. Mancia.  Okay for discharge from orthopedic perspective. Follow up in 2 weeks      I spent 35 minutes in the professional and overall care of  this patient.      Casandra Ya PA-C

## 2024-09-13 NOTE — CARE PLAN
The patient's goals for the shift include remain safe    The clinical goals for the shift include pt will remain safe and free of falls through out the shift    Over the shift, the patient did not make progress toward the following goals. Barriers to progression include . Recommendations to address these barriers include .

## 2024-09-13 NOTE — PROGRESS NOTES
"  INFECTIOUS DISEASE DAILY PROGRESS NOTE    SUBJECTIVE:    No new symptoms. Continues to improve overall. Afebrile.    OBJECTIVE:  VITALS (Last 24 Hours)  BP (!) 158/92   Pulse 98   Temp 36.9 °C (98.5 °F) (Temporal)   Resp 18   Ht 1.803 m (5' 11\")   Wt 95.3 kg (210 lb 1.6 oz)   SpO2 96%   BMI 29.30 kg/m²     PHYSICAL EXAM:  Gen - NAD, in bed  Lungs - no wh  Abd - soft, no ttp, BS present  MSK - left knee with surg dressing/drain  Skin - no rash    ABX: IV CTX    LABS:  Lab Results   Component Value Date    WBC 17.4 (H) 09/12/2024    HGB 11.5 (L) 09/12/2024    HCT 33.3 (L) 09/12/2024    MCV 79 (L) 09/12/2024     (H) 09/12/2024     Lab Results   Component Value Date    GLUCOSE 154 (H) 09/12/2024    CALCIUM 8.7 09/12/2024     09/12/2024    K 3.0 (L) 09/12/2024    CO2 25 09/12/2024     09/12/2024    BUN 13 09/12/2024    CREATININE 0.68 09/12/2024     Estimated Creatinine Clearance: 125 mL/min (by C-G formula based on SCr of 0.68 mg/dL).    ASSESSMENT/PLAN:    H. Flu Bacteremia - unclear original source of infection but is improving  Left Knee Native Joint Septic Arthritis - ortho evaluated, s/p arthrocentesis 9/9 with 100cc cloudy fluid. 26K WBCs, negative crystal exam. S/p OR 9/10 for washout. No additional growth from OR cultures.  Exophytic Left Kidney Lesion - looks like a hemorrhagic or proteinaceous cyst on MRI  Leukocytosis - reactive to OR and Decadron     IV CTX 2g Q24H plan through 10/8/24.    Midline in place.    On discharge - once weekly labs CBC/diff, CMP, ESR, CRP fax to Dr. Sidhu [phone].    Monitoring for adverse effects of abx such as rash/itching - none.     Will sign off. Please call back with questions. Thanks!    Yifan Sidhu MD  [facility]  Office #[phone]      "

## 2024-09-13 NOTE — PROGRESS NOTES
09/13/24 1431   Current Planned Discharge Disposition   Current Planned Discharge Disposition SNF     TCC spoke with admissions at [facility] on the phone. Able to accept pt. Facility will start auth. Stated would have an answer back until Monday.

## 2024-09-14 LAB
ANION GAP SERPL CALC-SCNC: 12 MMOL/L (ref 10–20)
BUN SERPL-MCNC: 8 MG/DL (ref 6–23)
CALCIUM SERPL-MCNC: 8.9 MG/DL (ref 8.6–10.3)
CHLORIDE SERPL-SCNC: 97 MMOL/L (ref 98–107)
CO2 SERPL-SCNC: 30 MMOL/L (ref 21–32)
CREAT SERPL-MCNC: 0.89 MG/DL (ref 0.5–1.3)
EGFRCR SERPLBLD CKD-EPI 2021: >90 ML/MIN/1.73M*2
ERYTHROCYTE [DISTWIDTH] IN BLOOD BY AUTOMATED COUNT: 12.8 % (ref 11.5–14.5)
GLUCOSE BLD MANUAL STRIP-MCNC: 144 MG/DL (ref 74–99)
GLUCOSE BLD MANUAL STRIP-MCNC: 165 MG/DL (ref 74–99)
GLUCOSE BLD MANUAL STRIP-MCNC: 171 MG/DL (ref 74–99)
GLUCOSE SERPL-MCNC: 152 MG/DL (ref 74–99)
HCT VFR BLD AUTO: 34.1 % (ref 41–52)
HGB BLD-MCNC: 11.6 G/DL (ref 13.5–17.5)
MCH RBC QN AUTO: 26.6 PG (ref 26–34)
MCHC RBC AUTO-ENTMCNC: 34 G/DL (ref 32–36)
MCV RBC AUTO: 78 FL (ref 80–100)
NRBC BLD-RTO: 0 /100 WBCS (ref 0–0)
PLATELET # BLD AUTO: 601 X10*3/UL (ref 150–450)
POTASSIUM SERPL-SCNC: 3.2 MMOL/L (ref 3.5–5.3)
RBC # BLD AUTO: 4.36 X10*6/UL (ref 4.5–5.9)
SODIUM SERPL-SCNC: 136 MMOL/L (ref 136–145)
WBC # BLD AUTO: 19.2 X10*3/UL (ref 4.4–11.3)

## 2024-09-14 PROCEDURE — 2500000002 HC RX 250 W HCPCS SELF ADMINISTERED DRUGS (ALT 637 FOR MEDICARE OP, ALT 636 FOR OP/ED): Performed by: INTERNAL MEDICINE

## 2024-09-14 PROCEDURE — 2500000004 HC RX 250 GENERAL PHARMACY W/ HCPCS (ALT 636 FOR OP/ED): Performed by: FAMILY MEDICINE

## 2024-09-14 PROCEDURE — 2500000001 HC RX 250 WO HCPCS SELF ADMINISTERED DRUGS (ALT 637 FOR MEDICARE OP): Performed by: NURSE PRACTITIONER

## 2024-09-14 PROCEDURE — 36415 COLL VENOUS BLD VENIPUNCTURE: CPT

## 2024-09-14 PROCEDURE — 2500000001 HC RX 250 WO HCPCS SELF ADMINISTERED DRUGS (ALT 637 FOR MEDICARE OP): Performed by: INTERNAL MEDICINE

## 2024-09-14 PROCEDURE — 2500000002 HC RX 250 W HCPCS SELF ADMINISTERED DRUGS (ALT 637 FOR MEDICARE OP, ALT 636 FOR OP/ED): Performed by: FAMILY MEDICINE

## 2024-09-14 PROCEDURE — 1100000001 HC PRIVATE ROOM DAILY

## 2024-09-14 PROCEDURE — 85027 COMPLETE CBC AUTOMATED: CPT

## 2024-09-14 PROCEDURE — 2500000004 HC RX 250 GENERAL PHARMACY W/ HCPCS (ALT 636 FOR OP/ED): Performed by: NURSE PRACTITIONER

## 2024-09-14 PROCEDURE — 80048 BASIC METABOLIC PNL TOTAL CA: CPT

## 2024-09-14 PROCEDURE — 2500000002 HC RX 250 W HCPCS SELF ADMINISTERED DRUGS (ALT 637 FOR MEDICARE OP, ALT 636 FOR OP/ED): Performed by: NURSE PRACTITIONER

## 2024-09-14 PROCEDURE — 82947 ASSAY GLUCOSE BLOOD QUANT: CPT

## 2024-09-14 PROCEDURE — 2500000001 HC RX 250 WO HCPCS SELF ADMINISTERED DRUGS (ALT 637 FOR MEDICARE OP): Performed by: STUDENT IN AN ORGANIZED HEALTH CARE EDUCATION/TRAINING PROGRAM

## 2024-09-14 RX ORDER — POTASSIUM CHLORIDE 20 MEQ/1
40 TABLET, EXTENDED RELEASE ORAL ONCE
Status: COMPLETED | OUTPATIENT
Start: 2024-09-14 | End: 2024-09-14

## 2024-09-14 RX ORDER — KETOROLAC TROMETHAMINE 30 MG/ML
15 INJECTION, SOLUTION INTRAMUSCULAR; INTRAVENOUS ONCE
Status: COMPLETED | OUTPATIENT
Start: 2024-09-14 | End: 2024-09-14

## 2024-09-14 ASSESSMENT — PAIN SCALES - PAIN ASSESSMENT IN ADVANCED DEMENTIA (PAINAD)
CONSOLABILITY: NO NEED TO CONSOLE
BODYLANGUAGE: RELAXED

## 2024-09-14 ASSESSMENT — COGNITIVE AND FUNCTIONAL STATUS - GENERAL
WALKING IN HOSPITAL ROOM: A LITTLE
DAILY ACTIVITIY SCORE: 24
MOBILITY SCORE: 22
CLIMB 3 TO 5 STEPS WITH RAILING: A LITTLE
MOBILITY SCORE: 24
DAILY ACTIVITIY SCORE: 24

## 2024-09-14 ASSESSMENT — PAIN SCALES - GENERAL
PAINLEVEL_OUTOF10: 8
PAINLEVEL_OUTOF10: 9
PAINLEVEL_OUTOF10: 3
PAINLEVEL_OUTOF10: 7
PAINLEVEL_OUTOF10: 3
PAINLEVEL_OUTOF10: 8
PAINLEVEL_OUTOF10: 8

## 2024-09-14 ASSESSMENT — PAIN DESCRIPTION - DESCRIPTORS
DESCRIPTORS: ACHING

## 2024-09-14 ASSESSMENT — PAIN - FUNCTIONAL ASSESSMENT
PAIN_FUNCTIONAL_ASSESSMENT: 0-10

## 2024-09-14 ASSESSMENT — PAIN DESCRIPTION - LOCATION
LOCATION: KNEE

## 2024-09-14 ASSESSMENT — PAIN DESCRIPTION - ORIENTATION
ORIENTATION: LEFT

## 2024-09-14 ASSESSMENT — PAIN SCALES - WONG BAKER: WONGBAKER_NUMERICALRESPONSE: HURTS WHOLE LOT

## 2024-09-14 NOTE — CARE PLAN
Problem: Fall/Injury  Goal: Not fall by end of shift  Outcome: Progressing  Goal: Be free from injury by end of the shift  Outcome: Progressing  Goal: Verbalize understanding of personal risk factors for fall in the hospital  Outcome: Progressing  Goal: Verbalize understanding of risk factor reduction measures to prevent injury from fall in the home  Outcome: Progressing  Goal: Use assistive devices by end of the shift  Outcome: Progressing  Goal: Pace activities to prevent fatigue by end of the shift  Outcome: Progressing   The patient's goals for the shift include remain safe    The clinical goals for the shift include pt will remain comfortable throughout the shift    Over the shift, the patient did not make progress toward the following goals. Barriers to progression include ambulation. Recommendations to address these barriers include rehab.

## 2024-09-14 NOTE — PROGRESS NOTES
Gabe Oneilfield Francis is a 39 y.o. male     Patient agreeable to go to SNF  Struggling with therapy with severe pain in the knee    Review of Systems     Constitutional: Feeling poorly  Cardiovascular: no chest pain   Respiratory: no cough, wheezing or shortness of breath a  Gastrointestinal: no abdominal pain, no constipation, no melena, no does have constipatoin   Neurological: no headache,   All other systems have been reviewed and are negative for complaint.       Vitals:    09/14/24 1608   BP: 150/80   Pulse: 109   Resp: 17   Temp: 36.6 °C (97.8 °F)   SpO2: 92%        Scheduled medications  amLODIPine, 10 mg, oral, Daily  aspirin, 81 mg, oral, BID  cefTRIAXone, 2 g, intravenous, q24h  cyclobenzaprine, 5 mg, oral, TID  [Held by provider] docusate sodium, 100 mg, oral, BID  insulin lispro, 0-5 Units, subcutaneous, Before meals & nightly  lidocaine, 5 mL, infiltration, Once  lisinopril, 20 mg, oral, Daily  metFORMIN, 500 mg, oral, BID  pantoprazole, 40 mg, oral, Daily before breakfast   Or  pantoprazole, 40 mg, intravenous, Daily before breakfast  [Held by provider] polyethylene glycol, 17 g, oral, Daily      Continuous medications       PRN medications  PRN medications: acetaminophen **OR** acetaminophen **OR** acetaminophen, alum-mag hydroxide-simeth, guaiFENesin, hydrALAZINE, loperamide, melatonin, ondansetron **OR** ondansetron, oxyCODONE, oxyCODONE    Lab Review   Results from last 7 days   Lab Units 09/14/24  0752 09/13/24  0458 09/12/24  0649   WBC AUTO x10*3/uL 19.2* 17.2* 17.4*   HEMOGLOBIN g/dL 11.6* 12.5* 11.5*   HEMATOCRIT % 34.1* 36.8* 33.3*   PLATELETS AUTO x10*3/uL 601* 544* 498*     Results from last 7 days   Lab Units 09/14/24  0752 09/13/24  1636 09/13/24  0458 09/12/24  0648   SODIUM mmol/L 136  --  137 137   POTASSIUM mmol/L 3.2* 3.4* 2.9* 3.0*   CHLORIDE mmol/L 97*  --  97* 100   CO2 mmol/L 30  --  29 25   BUN mg/dL 8  --  9 13   CREATININE mg/dL 0.89  --  0.72 0.68   CALCIUM mg/dL 8.9  --  9.0  8.7   GLUCOSE mg/dL 152*  --  137* 154*              Bedside Midline Imaging   Final Result      XR chest 1 view   Final Result   No airspace consolidation or pleural effusion.        MACRO:   None        Signed by: Haja Johnson 9/10/2024 2:53 AM   Dictation workstation:   [id number]      XR knee left 3 views   Final Result   Large suprapatellar effusion.        No acute fracture or dislocation.        MACRO:   None        Signed by: Tyson Yu 9/10/2024 8:14 AM   Dictation workstation:   [id number]      CT thoracic spine w IV contrast   Final Result   No bony abnormality or soft tissue inflammation to suggest   osteomyelitis or discitis are noted.        No stenoses of the thoracic spine are noted.        MACRO:   None        Signed by: Regino Kurtz 9/9/2024 10:14 AM   Dictation workstation:   [id number]      CT lumbar spine w IV contrast   Final Result   Addendum (preliminary) 1 of 1   Interpreted By:  Regino Kurtz,    ADDENDUM:   Enhanced images of the lumbar spine were obtained with coronal and   sagittal reconstructions using 75 mL Omnipaque 350.        The paraspinous and spinal canal soft tissues enhance normally with   no abnormality suggesting infection noted.        Signed by: Regino Kurtz 9/9/2024 10:15 AM        -------- ORIGINAL REPORT --------   Dictation workstation:   [id number]      Final   No bony erosions or inflammatory soft tissue changes suggestive of   osteomyelitis/discitis in the lumbar spine are noted.        MACRO:   None        Signed by: Regino Kurtz 9/9/2024 10:11 AM   Dictation workstation:   [id number]      MR kidney wo IV contrast   Final Result   The questioned left renal lesion (2 cm) likely corresponds to a   hemorrhagic/proteinaceous cyst within limits of noncontrast enhanced   examination.        Hepatic steatosis. Hepatosplenomegaly.        MACRO   None        Signed by: Breana Pimentel 9/9/2024 8:11 AM   Dictation workstation:   [id number]      Lower extremity venous  duplex bilateral   Final Result   No sonographic evidence for deep vein thrombosis within the evaluated   veins of the bilateral lower extremities.        MACRO:   None        Signed by: Jeffery Mendez 9/9/2024 5:52 AM   Dictation workstation:   [id number]      CT head wo IV contrast   Final Result   1. No acute intracranial abnormality identified.   2. Small chronic appearing defect along the left parietal calvarium   with overlying soft tissue density/scarring. Correlate for prior   procedure such as dallas hole at this site.             If there is concern for ongoing intracranial abnormality then MRI may   be performed for further follow-up.        MACRO:   None        Signed by: Sha Lombardo 9/5/2024 7:35 PM   Dictation workstation:   [id number]      CT chest abdomen pelvis wo IV contrast   Final Result   1. Hepatomegaly and hepatic steatosis.   2. Soft tissue attenuating exophytic lesion arising from the   posterior aspect of the left kidney, incompletely characterized on   this exam. Recommend follow-up renal mass CT or MRI for further   assessment.        MACRO:   None        Signed by: Sha Lombardo 9/5/2024 7:53 PM   Dictation workstation:   [id number]      XR chest 2 views   Final Result   No acute pathologic findings are identified on this exam.        MACRO:   none        Signed by: Gabe White 9/5/2024 4:56 PM   Dictation workstation:   [id number]            Physical Exam    Constitutional   General appearance: Alert   Pulmonary   Respiratory assessment: No respiratory distress, normal respiratory rhythm and effort.    Auscultation of Lungs: Clear bilateral breath sounds.   Cardiovascular   Auscultation of heart: Apical pulse normal, heart rate and rhythm normal, normal S1 and S2, no murmurs and no pericardial rub.    Exam for edema: No peripheral edema.   Abdomen   Abdominal Exam: No bruits, normal bowel sounds, soft, non-tender, no abdominal mass palpated.    Liver and Spleen exam: No  hepato-splenomegaly.   Musculoskeletal   Left knee in wrap  Neurologic   Cranial nerves: Nerves 2-12 were intact, no focal neuro defects.         Assessment/Plan      #Gram-negative bacteremia  Septic arthritis of left knee  S/p I&D  Continue IV antibiotics  Will need 4 weeks of IV antibiotics    #Uncontrolled hypertension  Increase lisinopril    #Hypokalemia  Replenish    #Diarrhea  Stool cultures negative      #New onset diabetes  20 metformin          #Generalized myalgias and arthralgias  Elevated CRP RP and ESR noted  Likely brought on by the sepsis    Hypokalemia supplemnt  B P control is better

## 2024-09-14 NOTE — CARE PLAN
The patient's goals for the shift include remain safe    The clinical goals for the shift include Pt will have decreased pain throughout this shift.    Problem: Fall/Injury  Goal: Not fall by end of shift  Outcome: Progressing  Goal: Be free from injury by end of the shift  Outcome: Progressing  Goal: Verbalize understanding of personal risk factors for fall in the hospital  Outcome: Progressing  Goal: Verbalize understanding of risk factor reduction measures to prevent injury from fall in the home  Outcome: Progressing  Goal: Use assistive devices by end of the shift  Outcome: Progressing  Goal: Pace activities to prevent fatigue by end of the shift  Outcome: Progressing

## 2024-09-14 NOTE — PROGRESS NOTES
Physical Therapy                 Therapy Communication Note    Patient Name: Gabe Linder Jr.  MRN: [id number]  Department: [place] [address]  Room: [address] [place] [address]  Today's Date: 9/14/2024     Discipline: Physical Therapy    Missed Visit Reason: Missed Visit Reason: Patient refused    Missed Time: Attempt    Comment: Patient refused therapy at this time.

## 2024-09-15 ENCOUNTER — APPOINTMENT (OUTPATIENT)
Dept: RADIOLOGY | Facility: HOSPITAL | Age: 39
DRG: 854 | End: 2024-09-15
Payer: COMMERCIAL

## 2024-09-15 LAB
ANION GAP SERPL CALC-SCNC: 17 MMOL/L (ref 10–20)
BACTERIA BLD CULT: NORMAL
BUN SERPL-MCNC: 16 MG/DL (ref 6–23)
CALCIUM SERPL-MCNC: 9 MG/DL (ref 8.6–10.3)
CHLORIDE SERPL-SCNC: 95 MMOL/L (ref 98–107)
CO2 SERPL-SCNC: 26 MMOL/L (ref 21–32)
CREAT SERPL-MCNC: 1.44 MG/DL (ref 0.5–1.3)
EGFRCR SERPLBLD CKD-EPI 2021: 63 ML/MIN/1.73M*2
ERYTHROCYTE [DISTWIDTH] IN BLOOD BY AUTOMATED COUNT: 12.9 % (ref 11.5–14.5)
GLUCOSE BLD MANUAL STRIP-MCNC: 128 MG/DL (ref 74–99)
GLUCOSE BLD MANUAL STRIP-MCNC: 129 MG/DL (ref 74–99)
GLUCOSE BLD MANUAL STRIP-MCNC: 142 MG/DL (ref 74–99)
GLUCOSE BLD MANUAL STRIP-MCNC: 151 MG/DL (ref 74–99)
GLUCOSE BLD MANUAL STRIP-MCNC: 187 MG/DL (ref 74–99)
GLUCOSE BLD MANUAL STRIP-MCNC: 200 MG/DL (ref 74–99)
GLUCOSE SERPL-MCNC: 125 MG/DL (ref 74–99)
HCT VFR BLD AUTO: 32.1 % (ref 41–52)
HGB BLD-MCNC: 10.9 G/DL (ref 13.5–17.5)
MCH RBC QN AUTO: 26.6 PG (ref 26–34)
MCHC RBC AUTO-ENTMCNC: 34 G/DL (ref 32–36)
MCV RBC AUTO: 78 FL (ref 80–100)
NRBC BLD-RTO: 0 /100 WBCS (ref 0–0)
PLATELET # BLD AUTO: 521 X10*3/UL (ref 150–450)
POTASSIUM SERPL-SCNC: 3.5 MMOL/L (ref 3.5–5.3)
RBC # BLD AUTO: 4.1 X10*6/UL (ref 4.5–5.9)
SARS-COV-2 RNA RESP QL NAA+PROBE: NOT DETECTED
SODIUM SERPL-SCNC: 134 MMOL/L (ref 136–145)
WBC # BLD AUTO: 17.4 X10*3/UL (ref 4.4–11.3)

## 2024-09-15 PROCEDURE — 2500000002 HC RX 250 W HCPCS SELF ADMINISTERED DRUGS (ALT 637 FOR MEDICARE OP, ALT 636 FOR OP/ED): Performed by: NURSE PRACTITIONER

## 2024-09-15 PROCEDURE — 1100000001 HC PRIVATE ROOM DAILY

## 2024-09-15 PROCEDURE — 2500000001 HC RX 250 WO HCPCS SELF ADMINISTERED DRUGS (ALT 637 FOR MEDICARE OP): Performed by: STUDENT IN AN ORGANIZED HEALTH CARE EDUCATION/TRAINING PROGRAM

## 2024-09-15 PROCEDURE — 2500000004 HC RX 250 GENERAL PHARMACY W/ HCPCS (ALT 636 FOR OP/ED): Performed by: NURSE PRACTITIONER

## 2024-09-15 PROCEDURE — 2500000001 HC RX 250 WO HCPCS SELF ADMINISTERED DRUGS (ALT 637 FOR MEDICARE OP): Performed by: INTERNAL MEDICINE

## 2024-09-15 PROCEDURE — 82374 ASSAY BLOOD CARBON DIOXIDE: CPT

## 2024-09-15 PROCEDURE — 87635 SARS-COV-2 COVID-19 AMP PRB: CPT | Performed by: INTERNAL MEDICINE

## 2024-09-15 PROCEDURE — 97535 SELF CARE MNGMENT TRAINING: CPT | Mod: GO

## 2024-09-15 PROCEDURE — 85027 COMPLETE CBC AUTOMATED: CPT

## 2024-09-15 PROCEDURE — 2500000001 HC RX 250 WO HCPCS SELF ADMINISTERED DRUGS (ALT 637 FOR MEDICARE OP): Performed by: NURSE PRACTITIONER

## 2024-09-15 PROCEDURE — 71045 X-RAY EXAM CHEST 1 VIEW: CPT | Performed by: RADIOLOGY

## 2024-09-15 PROCEDURE — 87040 BLOOD CULTURE FOR BACTERIA: CPT | Mod: AHULAB | Performed by: INTERNAL MEDICINE

## 2024-09-15 PROCEDURE — 82947 ASSAY GLUCOSE BLOOD QUANT: CPT

## 2024-09-15 PROCEDURE — 2500000002 HC RX 250 W HCPCS SELF ADMINISTERED DRUGS (ALT 637 FOR MEDICARE OP, ALT 636 FOR OP/ED): Performed by: INTERNAL MEDICINE

## 2024-09-15 PROCEDURE — 71045 X-RAY EXAM CHEST 1 VIEW: CPT

## 2024-09-15 PROCEDURE — 36415 COLL VENOUS BLD VENIPUNCTURE: CPT | Performed by: INTERNAL MEDICINE

## 2024-09-15 ASSESSMENT — PAIN SCALES - GENERAL
PAINLEVEL_OUTOF10: 7
PAINLEVEL_OUTOF10: 7
PAINLEVEL_OUTOF10: 3
PAINLEVEL_OUTOF10: 9
PAINLEVEL_OUTOF10: 8
PAINLEVEL_OUTOF10: 1
PAINLEVEL_OUTOF10: 4
PAINLEVEL_OUTOF10: 7

## 2024-09-15 ASSESSMENT — COGNITIVE AND FUNCTIONAL STATUS - GENERAL
WALKING IN HOSPITAL ROOM: A LITTLE
DAILY ACTIVITIY SCORE: 24
MOBILITY SCORE: 22
WALKING IN HOSPITAL ROOM: A LITTLE
DAILY ACTIVITIY SCORE: 19
PERSONAL GROOMING: A LITTLE
CLIMB 3 TO 5 STEPS WITH RAILING: A LITTLE
DAILY ACTIVITIY SCORE: 24
MOBILITY SCORE: 22
HELP NEEDED FOR BATHING: A LOT
DRESSING REGULAR LOWER BODY CLOTHING: A LOT
CLIMB 3 TO 5 STEPS WITH RAILING: A LITTLE

## 2024-09-15 ASSESSMENT — PAIN DESCRIPTION - DESCRIPTORS
DESCRIPTORS: ACHING

## 2024-09-15 ASSESSMENT — PAIN DESCRIPTION - LOCATION
LOCATION: KNEE
LOCATION: KNEE

## 2024-09-15 ASSESSMENT — PAIN - FUNCTIONAL ASSESSMENT
PAIN_FUNCTIONAL_ASSESSMENT: 0-10

## 2024-09-15 ASSESSMENT — ACTIVITIES OF DAILY LIVING (ADL)
EFFECT OF PAIN ON DAILY ACTIVITIES: OT TO TOLERANCE
HOME_MANAGEMENT_TIME_ENTRY: 23

## 2024-09-15 ASSESSMENT — PAIN DESCRIPTION - ORIENTATION
ORIENTATION: LEFT
ORIENTATION: LEFT

## 2024-09-15 NOTE — PROGRESS NOTES
09/15/24 0828   Current Planned Discharge Disposition   Current Planned Discharge Disposition SNF     Once med ready plan is to discharge to [facility], clinical updates sent to facility through care ports, per notes pt refused PT 9/15, auth started but will not know if accepted until Monday per notes, I will continue to monitor for discharge planning.

## 2024-09-15 NOTE — CARE PLAN
The patient's goals for the shift include Pt. will have a safe, restful and uneventful evening    The clinical goals for the shift include Pt. will be afebrile by end of shift      Problem: Fall/Injury  Goal: Not fall by end of shift  Outcome: Progressing  Goal: Be free from injury by end of the shift  Outcome: Progressing  Goal: Verbalize understanding of personal risk factors for fall in the hospital  Outcome: Progressing  Goal: Verbalize understanding of risk factor reduction measures to prevent injury from fall in the home  Outcome: Progressing  Goal: Use assistive devices by end of the shift  Outcome: Progressing  Goal: Pace activities to prevent fatigue by end of the shift  Outcome: Progressing     Problem: Pain - Adult  Goal: Verbalizes/displays adequate comfort level or baseline comfort level  Outcome: Progressing     Problem: Safety - Adult  Goal: Free from fall injury  Outcome: Progressing     Problem: Discharge Planning  Goal: Discharge to home or other facility with appropriate resources  Outcome: Progressing     Problem: Chronic Conditions and Co-morbidities  Goal: Patient's chronic conditions and co-morbidity symptoms are monitored and maintained or improved  Outcome: Progressing     Problem: Diabetes  Goal: Achieve decreasing blood glucose levels by end of shift  Outcome: Progressing  Goal: Increase stability of blood glucose readings by end of shift  Outcome: Progressing  Goal: Decrease in ketones present in urine by end of shift  Outcome: Progressing  Goal: Maintain electrolyte levels within acceptable range throughout shift  Outcome: Progressing  Goal: Maintain glucose levels >70mg/dl to <250mg/dl throughout shift  Outcome: Progressing  Goal: No changes in neurological exam by end of shift  Outcome: Progressing  Goal: Learn about and adhere to nutrition recommendations by end of shift  Outcome: Progressing  Goal: Vital signs within normal range for age by end of shift  Outcome: Progressing  Goal:  Increase self care and/or family involovement by end of shift  Outcome: Progressing  Goal: Receive DSME education by end of shift  Outcome: Progressing     Problem: Pain  Goal: Takes deep breaths with improved pain control throughout the shift  Outcome: Progressing  Goal: Turns in bed with improved pain control throughout the shift  Outcome: Progressing  Goal: Walks with improved pain control throughout the shift  Outcome: Progressing  Goal: Performs ADL's with improved pain control throughout shift  Outcome: Progressing  Goal: Participates in PT with improved pain control throughout the shift  Outcome: Progressing  Goal: Free from opioid side effects throughout the shift  Outcome: Progressing  Goal: Free from acute confusion related to pain meds throughout the shift  Outcome: Progressing

## 2024-09-15 NOTE — PROGRESS NOTES
Occupational Therapy    OT Treatment    Patient Name: Gabe Linder Jr.  MRN: [id number]  Department: [place] [address]  Room: [address] [place] [address]  Today's Date: 9/15/2024  Time Calculation  Start Time: 0915  Stop Time: 0938  Time Calculation (min): 23 min        Assessment:  OT Assessment: Pt progressing with OT this session requiring CGA for transfers and extended time to ambulate due to pain.  Prognosis: Good  Barriers to Discharge: None  Evaluation/Treatment Tolerance: Patient limited by pain  Medical Staff Made Aware: Yes  End of Session Communication: Bedside nurse  End of Session Patient Position: Up in chair, Alarm on  OT Assessment Results: Decreased ADL status, Decreased endurance, Decreased IADLs  Prognosis: Good  Barriers to Discharge: None  Evaluation/Treatment Tolerance: Patient limited by pain  Medical Staff Made Aware: Yes  Strengths: Ability to acquire knowledge, Access to adaptive/assistive products, Attitude of self, Capable of completing ADLs semi/independent, Coping skills, Insight into problems  Plan:  Treatment Interventions: ADL retraining, Functional transfer training, UE strengthening/ROM, Endurance training, Compensatory technique education  OT Frequency: 3 times per week  OT Discharge Recommendations: High intensity level of continued care  Equipment Recommended upon Discharge: Wheeled walker  OT Recommended Transfer Status: Assist of 1  OT - OK to Discharge: Yes  Treatment Interventions: ADL retraining, Functional transfer training, UE strengthening/ROM, Endurance training, Compensatory technique education    Subjective   Previous Visit Info:  OT Last Visit  OT Received On: 09/15/24  General:  General  Reason for Referral: Dificulty walking;  s/p left Knee I&D with Dr Mancia  Referred By: Dr. Monroy  Past Medical History Relevant to Rehab:   Past Medical History:   Diagnosis Date    Arthritis, septic (Multi)     ETOH abuse     GERD (gastroesophageal reflux disease)     Haemophilus influenzae infection      HTN (hypertension)     Type 2 diabetes mellitus (Multi)        Prior to Session Communication: Bedside nurse  Patient Position Received: Bed, 3 rail up, Alarm off, not on at start of session  Preferred Learning Style: auditory, kinesthetic, written  General Comment: Pt agreeable to OT  Precautions:  LE Weight Bearing Status: Weight Bearing as Tolerated  Medical Precautions: Fall precautions    Pain:  Pain Assessment  Pain Assessment: 0-10  0-10 (Numeric) Pain Score: 8  Pain Type: Acute pain  Pain Location: Knee  Pain Orientation: Left  Effect of Pain on Daily Activities: OT to tolerance    Objective    Cognition:  Cognition  Overall Cognitive Status: Within Functional Limits     Activities of Daily Living: Grooming  Grooming Level of Assistance: Close supervision  Grooming Where Assessed: Standing sinkside  Grooming Comments: Pt stood at the sink to complete grooming tasks, pt demo's good balance requiring close SUP for safety    Toileting  Toileting Level of Assistance: Close supervision  Where Assessed: Toilet  Toileting Comments: Pt able to complete toileting tasks with SUP and cues for use of grab bar for safety  Functional Standing Tolerance:     Bed Mobility/Transfers: Bed Mobility  Bed Mobility: Yes  Bed Mobility 1  Bed Mobility 1: Supine to sitting  Level of Assistance 1: Close supervision    Transfers  Transfer: Yes  Transfer 1  Technique 1: Sit to stand, Stand to sit  Transfer Device 1: Walker  Transfer Level of Assistance 1: Contact guard, Close supervision  Trials/Comments 1: CGA to stand from the bed progressing to close SUP, bed elevated; close SUP when sitting  Transfers 2  Transfer From 2: Toilet to  Transfer to 2: Chair with arms  Transfer Device 2: Walker  Transfer Level of Assistance 2: Contact guard  Trials/Comments 2: CGA for safety, pt able to take steps from the toilet to the chair using the FWW, extended time due to pain    Toilet Transfers  Toilet Transfer From: Bed  Toilet Transfer Type:  To  Toilet Transfer to: Standard toilet  Toilet Transfer Technique: Ambulating  Toilet Transfers: Contact guard  Toilet Transfers Comments: CGA for safety, cues for use of grab bar when sitting  Sitting Balance:  Static Sitting Balance  Static Sitting-Balance Support: Feet supported  Static Sitting-Level of Assistance: Distant supervision  Dynamic Sitting Balance  Dynamic Sitting-Balance Support: Feet supported  Dynamic Sitting-Level of Assistance: Distant supervision  Standing Balance:  Static Standing Balance  Static Standing-Balance Support: Bilateral upper extremity supported, No upper extremity supported  Static Standing-Level of Assistance: Contact guard  Static Standing-Comment/Number of Minutes: Pt ehsan's good balance standing with the FWW and standing at the sink to complete tasks  Dynamic Standing Balance  Dynamic Standing-Balance Support: Bilateral upper extremity supported  Dynamic Standing-Level of Assistance: Contact guard  Dynamic Standing-Balance: Reaching for objects, Forward lean        Other Activity: Pt's ehsan's fair balance with functional ambulation requiring CGA and the FWW for stability.        RUE   RUE : Within Functional Limits and LUE   LUE: Within Functional Limits    Outcome Measures:[facility] Daily Activity  Putting on and taking off regular lower body clothing: A lot  Bathing (including washing, rinsing, drying): A lot  Putting on and taking off regular upper body clothing: None  Toileting, which includes using toilet, bedpan or urinal: None  Taking care of personal grooming such as brushing teeth: A little  Eating Meals: None  Daily Activity - Total Score: 19    Education Documentation  Body Mechanics, taught by Kathryn Mcintosh OT at 9/15/2024  9:53 AM.  Learner: Patient  Readiness: Acceptance  Method: Explanation, Demonstration  Response: Verbalizes Understanding    Precautions, taught by Kathryn Mcintosh OT at 9/15/2024  9:53 AM.  Learner: Patient  Readiness: Acceptance  Method:  Explanation, Demonstration  Response: Verbalizes Understanding    ADL Training, taught by Kathryn Mcintosh OT at 9/15/2024  9:53 AM.  Learner: Patient  Readiness: Acceptance  Method: Explanation, Demonstration  Response: Verbalizes Understanding    Education Comments  No comments found.           Goals:  Encounter Problems       Encounter Problems (Active)       ADLs       Patient will perform UB and LB bathing with modified independent level of assistance. (Progressing)       Start:  09/12/24            Patient with complete upper body dressing with independent level of assistance donning and doffing all UE clothes with no adaptive equipment. (Progressing)       Start:  09/12/24            Patient with complete lower body dressing with modified independent level of assistance donning and doffing all LE clothes  with PRN adaptive equipment. (Progressing)       Start:  09/12/24            Patient will complete daily grooming tasks brushing teeth and washing face/hair with independent level of assistance and PRN adaptive equipment. (Progressing)       Start:  09/12/24            Patient will complete toileting including hygiene clothing management/hygiene with independent level of assistance. (Progressing)       Start:  09/12/24               BALANCE       Pt will maintain dynamic standing balance during ADL task with modified independent level of assistance in order to demonstrate decreased risk of falling and improved postural control. (Progressing)       Start:  09/12/24               MOBILITY       Patient will perform Functional mobility mod  Household distances/Community Distances with modified independent level of assistance and least restrictive device in order to improve safety and functional mobility. (Progressing)       Start:  09/12/24               TRANSFERS       Patient will complete sit to stand transfer with independent level of assistance and least restrictive device in order to improve safety and  prepare for out of bed mobility. (Progressing)       Start:  09/12/24

## 2024-09-15 NOTE — PROGRESS NOTES
09/15/24 0840   Current Planned Discharge Disposition   Current Planned Discharge Disposition Rehab     Per notes [facility] received auth for patient, I did talk to the patient at his bedside he is agreeable to discharge to [facility], I did reach out to facility to see if they can accept today, patient is med ready, I will continue to monitor for discharge planning.    11:28 transport canceled Dc on hold, ID consult placed will continue to monitor

## 2024-09-15 NOTE — CARE PLAN
The patient's goals for the shift include Pt. will have a safe, restful and uneventful evening    The clinical goals for the shift include Patient will remain afebrile by end of shift today.  Problem: Fall/Injury  Goal: Not fall by end of shift  9/15/2024 1425 by Fazal Nassar RN  Outcome: Progressing  9/15/2024 1424 by Fazal Nassar RN  Outcome: Progressing  Goal: Be free from injury by end of the shift  9/15/2024 1425 by Fazal Nassar RN  Outcome: Progressing  9/15/2024 1424 by Fazal Nassar RN  Outcome: Progressing  Goal: Verbalize understanding of personal risk factors for fall in the hospital  9/15/2024 1425 by Fazal Nassar RN  Outcome: Progressing  9/15/2024 1424 by Fazal Nassar RN  Outcome: Progressing  Goal: Verbalize understanding of risk factor reduction measures to prevent injury from fall in the home  9/15/2024 1425 by Fazal Nassar RN  Outcome: Progressing  9/15/2024 1424 by Fazal Nassar RN  Outcome: Progressing  Goal: Use assistive devices by end of the shift  9/15/2024 1425 by Fazal Nassar RN  Outcome: Progressing  9/15/2024 1424 by Fazal Nassar RN  Outcome: Progressing  Goal: Pace activities to prevent fatigue by end of the shift  9/15/2024 1425 by Fazal Nassar RN  Outcome: Progressing  9/15/2024 1424 by Fazal Nassar RN  Outcome: Progressing

## 2024-09-15 NOTE — PROGRESS NOTES
"                                                                                                               '' Infectious Disease Progress Note''        Interval Events:  Patient spiked fever  No new symptoms except cough  No diarrhea  WBC 17K  Estimated Creatinine Clearance: 80.3 mL/min (A) (by C-G formula based on SCr of 1.44 mg/dL (H)).      Current antibiotic:  Ceftriaxone    Physical Exam:  /79 (BP Location: Left arm, Patient Position: Lying)   Pulse 108   Temp 36 °C (96.8 °F) (Oral)   Resp 17   Ht 1.803 m (5' 11\")   Wt 95.3 kg (210 lb 1.6 oz)   SpO2 92%   BMI 29.30 kg/m²   General: NAD, nontoxic appearing  Skin: no new rash  Resp: lungs CTA b/l  CV:  normal S1/S2, no murmur   Abd: soft, non-tender  Ext: Left knee swelling, SS is healing, L PICC      Lab Results:  Reviewed    Micro:  9/9 left knee synovial fluid culture: Negative  9/5 blood culture: haemophilus influenza    Imaging: reviewed         Assessment:    -Fever and cough  -Haemophilus influenza bacteremia  -Left knee septic arthritis s/p I&D 9/10-> Cx neg  -Exophytic left kidney lesion  -Leukocytosis      Plan/Recommendations:  -Continue ceftriaxone  -Repeat blood culture  -Chest x-ray  -Covid swab  -LLE duplex to r/o DVT  -fever chart      Please call ID with any concerns or questions.  Doe Pt    Matt Owens MD  [facility]  #[phone]      "

## 2024-09-16 VITALS
WEIGHT: 210.1 LBS | HEIGHT: 71 IN | OXYGEN SATURATION: 94 % | TEMPERATURE: 99.9 F | RESPIRATION RATE: 18 BRPM | BODY MASS INDEX: 29.41 KG/M2 | HEART RATE: 110 BPM | SYSTOLIC BLOOD PRESSURE: 132 MMHG | DIASTOLIC BLOOD PRESSURE: 91 MMHG

## 2024-09-16 LAB
ALBUMIN SERPL BCP-MCNC: 2.8 G/DL (ref 3.4–5)
ALP SERPL-CCNC: 92 U/L (ref 33–120)
ALT SERPL W P-5'-P-CCNC: 19 U/L (ref 10–52)
ANION GAP SERPL CALC-SCNC: 13 MMOL/L (ref 10–20)
AST SERPL W P-5'-P-CCNC: 19 U/L (ref 9–39)
BASOPHILS # BLD MANUAL: 0 X10*3/UL (ref 0–0.1)
BASOPHILS NFR BLD MANUAL: 0 %
BILIRUB DIRECT SERPL-MCNC: 0.1 MG/DL (ref 0–0.3)
BILIRUB SERPL-MCNC: 0.3 MG/DL (ref 0–1.2)
BUN SERPL-MCNC: 15 MG/DL (ref 6–23)
CALCIUM SERPL-MCNC: 8.6 MG/DL (ref 8.6–10.3)
CHLORIDE SERPL-SCNC: 96 MMOL/L (ref 98–107)
CO2 SERPL-SCNC: 28 MMOL/L (ref 21–32)
CREAT SERPL-MCNC: 1.18 MG/DL (ref 0.5–1.3)
EGFRCR SERPLBLD CKD-EPI 2021: 80 ML/MIN/1.73M*2
EOSINOPHIL # BLD MANUAL: 0.46 X10*3/UL (ref 0–0.7)
EOSINOPHIL NFR BLD MANUAL: 3 %
ERYTHROCYTE [DISTWIDTH] IN BLOOD BY AUTOMATED COUNT: 12.9 % (ref 11.5–14.5)
ERYTHROCYTE [DISTWIDTH] IN BLOOD BY AUTOMATED COUNT: 12.9 % (ref 11.5–14.5)
GLUCOSE BLD MANUAL STRIP-MCNC: 133 MG/DL (ref 74–99)
GLUCOSE BLD MANUAL STRIP-MCNC: 148 MG/DL (ref 74–99)
GLUCOSE BLD MANUAL STRIP-MCNC: 155 MG/DL (ref 74–99)
GLUCOSE SERPL-MCNC: 125 MG/DL (ref 74–99)
HCT VFR BLD AUTO: 33.5 % (ref 41–52)
HCT VFR BLD AUTO: 33.5 % (ref 41–52)
HGB BLD-MCNC: 11.2 G/DL (ref 13.5–17.5)
HGB BLD-MCNC: 11.2 G/DL (ref 13.5–17.5)
IMM GRANULOCYTES # BLD AUTO: 0.61 X10*3/UL (ref 0–0.7)
IMM GRANULOCYTES NFR BLD AUTO: 4 % (ref 0–0.9)
LYMPHOCYTES # BLD MANUAL: 2.28 X10*3/UL (ref 1.2–4.8)
LYMPHOCYTES NFR BLD MANUAL: 15 %
MCH RBC QN AUTO: 26.3 PG (ref 26–34)
MCH RBC QN AUTO: 26.3 PG (ref 26–34)
MCHC RBC AUTO-ENTMCNC: 33.4 G/DL (ref 32–36)
MCHC RBC AUTO-ENTMCNC: 33.4 G/DL (ref 32–36)
MCV RBC AUTO: 79 FL (ref 80–100)
MCV RBC AUTO: 79 FL (ref 80–100)
METAMYELOCYTES # BLD MANUAL: 0.3 X10*3/UL
METAMYELOCYTES NFR BLD MANUAL: 2 %
MONOCYTES # BLD MANUAL: 0.61 X10*3/UL (ref 0.1–1)
MONOCYTES NFR BLD MANUAL: 4 %
NEUTROPHILS # BLD MANUAL: 10.33 X10*3/UL (ref 1.2–7.7)
NEUTS BAND # BLD MANUAL: 1.06 X10*3/UL (ref 0–0.7)
NEUTS BAND NFR BLD MANUAL: 7 %
NEUTS SEG # BLD MANUAL: 9.27 X10*3/UL (ref 1.2–7)
NEUTS SEG NFR BLD MANUAL: 61 %
NRBC BLD-RTO: 0 /100 WBCS (ref 0–0)
NRBC BLD-RTO: 0 /100 WBCS (ref 0–0)
OVALOCYTES BLD QL SMEAR: ABNORMAL
PLATELET # BLD AUTO: 685 X10*3/UL (ref 150–450)
PLATELET # BLD AUTO: 685 X10*3/UL (ref 150–450)
POTASSIUM SERPL-SCNC: 3.4 MMOL/L (ref 3.5–5.3)
PROT SERPL-MCNC: 6.6 G/DL (ref 6.4–8.2)
RBC # BLD AUTO: 4.26 X10*6/UL (ref 4.5–5.9)
RBC # BLD AUTO: 4.26 X10*6/UL (ref 4.5–5.9)
RBC MORPH BLD: ABNORMAL
SODIUM SERPL-SCNC: 134 MMOL/L (ref 136–145)
TARGETS BLD QL SMEAR: ABNORMAL
TOTAL CELLS COUNTED BLD: 100
VARIANT LYMPHS # BLD MANUAL: 1.22 X10*3/UL (ref 0–0.5)
VARIANT LYMPHS NFR BLD: 8 %
WBC # BLD AUTO: 15.2 X10*3/UL (ref 4.4–11.3)
WBC # BLD AUTO: 15.2 X10*3/UL (ref 4.4–11.3)

## 2024-09-16 PROCEDURE — 80053 COMPREHEN METABOLIC PANEL: CPT

## 2024-09-16 PROCEDURE — 85027 COMPLETE CBC AUTOMATED: CPT | Performed by: INTERNAL MEDICINE

## 2024-09-16 PROCEDURE — 1100000001 HC PRIVATE ROOM DAILY

## 2024-09-16 PROCEDURE — 2500000001 HC RX 250 WO HCPCS SELF ADMINISTERED DRUGS (ALT 637 FOR MEDICARE OP): Performed by: NURSE PRACTITIONER

## 2024-09-16 PROCEDURE — 2500000001 HC RX 250 WO HCPCS SELF ADMINISTERED DRUGS (ALT 637 FOR MEDICARE OP): Performed by: STUDENT IN AN ORGANIZED HEALTH CARE EDUCATION/TRAINING PROGRAM

## 2024-09-16 PROCEDURE — 2500000002 HC RX 250 W HCPCS SELF ADMINISTERED DRUGS (ALT 637 FOR MEDICARE OP, ALT 636 FOR OP/ED): Performed by: NURSE PRACTITIONER

## 2024-09-16 PROCEDURE — 97530 THERAPEUTIC ACTIVITIES: CPT | Mod: GP,CQ

## 2024-09-16 PROCEDURE — 2500000004 HC RX 250 GENERAL PHARMACY W/ HCPCS (ALT 636 FOR OP/ED): Performed by: NURSE PRACTITIONER

## 2024-09-16 PROCEDURE — 2500000001 HC RX 250 WO HCPCS SELF ADMINISTERED DRUGS (ALT 637 FOR MEDICARE OP): Performed by: INTERNAL MEDICINE

## 2024-09-16 PROCEDURE — 82947 ASSAY GLUCOSE BLOOD QUANT: CPT

## 2024-09-16 PROCEDURE — 85007 BL SMEAR W/DIFF WBC COUNT: CPT | Performed by: INTERNAL MEDICINE

## 2024-09-16 PROCEDURE — 2500000002 HC RX 250 W HCPCS SELF ADMINISTERED DRUGS (ALT 637 FOR MEDICARE OP, ALT 636 FOR OP/ED): Performed by: INTERNAL MEDICINE

## 2024-09-16 PROCEDURE — 97116 GAIT TRAINING THERAPY: CPT | Mod: GP,CQ

## 2024-09-16 PROCEDURE — 85027 COMPLETE CBC AUTOMATED: CPT

## 2024-09-16 PROCEDURE — 84075 ASSAY ALKALINE PHOSPHATASE: CPT | Performed by: INTERNAL MEDICINE

## 2024-09-16 PROCEDURE — 99232 SBSQ HOSP IP/OBS MODERATE 35: CPT | Performed by: INTERNAL MEDICINE

## 2024-09-16 RX ORDER — POTASSIUM CHLORIDE 20 MEQ/1
20 TABLET, EXTENDED RELEASE ORAL ONCE
Status: COMPLETED | OUTPATIENT
Start: 2024-09-16 | End: 2024-09-16

## 2024-09-16 ASSESSMENT — COGNITIVE AND FUNCTIONAL STATUS - GENERAL
TURNING FROM BACK TO SIDE WHILE IN FLAT BAD: A LITTLE
CLIMB 3 TO 5 STEPS WITH RAILING: TOTAL
MOBILITY SCORE: 16
MOVING TO AND FROM BED TO CHAIR: A LITTLE
DAILY ACTIVITIY SCORE: 24
CLIMB 3 TO 5 STEPS WITH RAILING: A LITTLE
MOVING FROM LYING ON BACK TO SITTING ON SIDE OF FLAT BED WITH BEDRAILS: A LITTLE
WALKING IN HOSPITAL ROOM: A LITTLE
WALKING IN HOSPITAL ROOM: A LITTLE
MOBILITY SCORE: 22
STANDING UP FROM CHAIR USING ARMS: A LITTLE

## 2024-09-16 ASSESSMENT — PAIN - FUNCTIONAL ASSESSMENT
PAIN_FUNCTIONAL_ASSESSMENT: 0-10

## 2024-09-16 ASSESSMENT — PAIN DESCRIPTION - DESCRIPTORS
DESCRIPTORS: ACHING
DESCRIPTORS: THROBBING
DESCRIPTORS: ACHING
DESCRIPTORS: THROBBING

## 2024-09-16 ASSESSMENT — PAIN SCALES - WONG BAKER
WONGBAKER_NUMERICALRESPONSE: HURTS WHOLE LOT
WONGBAKER_NUMERICALRESPONSE: HURTS WHOLE LOT

## 2024-09-16 ASSESSMENT — PAIN DESCRIPTION - LOCATION
LOCATION: KNEE

## 2024-09-16 ASSESSMENT — PAIN SCALES - PAIN ASSESSMENT IN ADVANCED DEMENTIA (PAINAD): TOTALSCORE: MEDICATION (SEE MAR)

## 2024-09-16 ASSESSMENT — PAIN SCALES - GENERAL
PAINLEVEL_OUTOF10: 8
PAINLEVEL_OUTOF10: 8
PAINLEVEL_OUTOF10: 5 - MODERATE PAIN
PAINLEVEL_OUTOF10: 8

## 2024-09-16 ASSESSMENT — PAIN DESCRIPTION - ORIENTATION
ORIENTATION: LEFT
ORIENTATION: LEFT

## 2024-09-16 NOTE — PROGRESS NOTES
Gabe Oneilfield Francis is a 39 y.o. male     Has had multiple episodes of fevers through the weekend  ID doing additional workup    Review of Systems     Constitutional: Feeling poorly  Cardiovascular: no chest pain   Respiratory: no cough, wheezing or shortness of breath a  Gastrointestinal: no abdominal pain, no constipation, no melena, no nausea, no diarrhea, no vomiting and no blood in stools.   Neurological: no headache,   All other systems have been reviewed and are negative for complaint.       Vitals:    09/16/24 0739   BP: 135/76   Pulse: 106   Resp: 16   Temp: 36.6 °C (97.8 °F)   SpO2: 95%        Scheduled medications  amLODIPine, 10 mg, oral, Daily  aspirin, 81 mg, oral, BID  cefTRIAXone, 2 g, intravenous, q24h  cyclobenzaprine, 5 mg, oral, TID  [Held by provider] docusate sodium, 100 mg, oral, BID  insulin lispro, 0-5 Units, subcutaneous, Before meals & nightly  lidocaine, 5 mL, infiltration, Once  lisinopril, 20 mg, oral, Daily  metFORMIN, 500 mg, oral, BID  pantoprazole, 40 mg, oral, Daily before breakfast   Or  pantoprazole, 40 mg, intravenous, Daily before breakfast  [Held by provider] polyethylene glycol, 17 g, oral, Daily      Continuous medications       PRN medications  PRN medications: acetaminophen **OR** acetaminophen **OR** acetaminophen, alum-mag hydroxide-simeth, guaiFENesin, hydrALAZINE, loperamide, melatonin, ondansetron **OR** ondansetron, oxyCODONE, oxyCODONE    Lab Review   Results from last 7 days   Lab Units 09/16/24  0527 09/15/24  0634 09/14/24  0752   WBC AUTO x10*3/uL 15.2* 17.4* 19.2*   HEMOGLOBIN g/dL 11.2* 10.9* 11.6*   HEMATOCRIT % 33.5* 32.1* 34.1*   PLATELETS AUTO x10*3/uL 685* 521* 601*     Results from last 7 days   Lab Units 09/16/24  0527 09/15/24  0634 09/14/24  0752   SODIUM mmol/L 134* 134* 136   POTASSIUM mmol/L 3.4* 3.5 3.2*   CHLORIDE mmol/L 96* 95* 97*   CO2 mmol/L 28 26 30   BUN mg/dL 15 16 8   CREATININE mg/dL 1.18 1.44* 0.89   CALCIUM mg/dL 8.6 9.0 8.9   PROTEIN  TOTAL g/dL 6.6  --   --    BILIRUBIN TOTAL mg/dL 0.3  --   --    ALK PHOS U/L 92  --   --    ALT U/L 19  --   --    AST U/L 19  --   --    GLUCOSE mg/dL 125* 125* 152*              XR chest 1 view   Final Result   1.  No evidence of acute cardiopulmonary process.                  MACRO:   None        Signed by: Andi Castillo 9/16/2024 8:44 AM   Dictation workstation:   [id number]      Bedside Midline Imaging   Final Result      XR chest 1 view   Final Result   No airspace consolidation or pleural effusion.        MACRO:   None        Signed by: Haja Johnson 9/10/2024 2:53 AM   Dictation workstation:   [id number]      XR knee left 3 views   Final Result   Large suprapatellar effusion.        No acute fracture or dislocation.        MACRO:   None        Signed by: Tyson Yu 9/10/2024 8:14 AM   Dictation workstation:   [id number]      CT thoracic spine w IV contrast   Final Result   No bony abnormality or soft tissue inflammation to suggest   osteomyelitis or discitis are noted.        No stenoses of the thoracic spine are noted.        MACRO:   None        Signed by: Regino Kurtz 9/9/2024 10:14 AM   Dictation workstation:   [id number]      CT lumbar spine w IV contrast   Final Result   Addendum (preliminary) 1 of 1   Interpreted By:  Regino Kurtz,    ADDENDUM:   Enhanced images of the lumbar spine were obtained with coronal and   sagittal reconstructions using 75 mL Omnipaque 350.        The paraspinous and spinal canal soft tissues enhance normally with   no abnormality suggesting infection noted.        Signed by: Regino Kurtz 9/9/2024 10:15 AM        -------- ORIGINAL REPORT --------   Dictation workstation:   [id number]      Final   No bony erosions or inflammatory soft tissue changes suggestive of   osteomyelitis/discitis in the lumbar spine are noted.        MACRO:   None        Signed by: Regino Kurtz 9/9/2024 10:11 AM   Dictation workstation:   [id number]      MR kidney wo IV contrast   Final  Result   The questioned left renal lesion (2 cm) likely corresponds to a   hemorrhagic/proteinaceous cyst within limits of noncontrast enhanced   examination.        Hepatic steatosis. Hepatosplenomegaly.        MACRO   None        Signed by: Breana Pimentel 9/9/2024 8:11 AM   Dictation workstation:   [id number]      Lower extremity venous duplex bilateral   Final Result   No sonographic evidence for deep vein thrombosis within the evaluated   veins of the bilateral lower extremities.        MACRO:   None        Signed by: Jeffery Mendez 9/9/2024 5:52 AM   Dictation workstation:   [id number]      CT head wo IV contrast   Final Result   1. No acute intracranial abnormality identified.   2. Small chronic appearing defect along the left parietal calvarium   with overlying soft tissue density/scarring. Correlate for prior   procedure such as dallas hole at this site.             If there is concern for ongoing intracranial abnormality then MRI may   be performed for further follow-up.        MACRO:   None        Signed by: Sha Lombardo 9/5/2024 7:35 PM   Dictation workstation:   [id number]      CT chest abdomen pelvis wo IV contrast   Final Result   1. Hepatomegaly and hepatic steatosis.   2. Soft tissue attenuating exophytic lesion arising from the   posterior aspect of the left kidney, incompletely characterized on   this exam. Recommend follow-up renal mass CT or MRI for further   assessment.        MACRO:   None        Signed by: Sha Lombardo 9/5/2024 7:53 PM   Dictation workstation:   [id number]      XR chest 2 views   Final Result   No acute pathologic findings are identified on this exam.        MACRO:   none        Signed by: Gabe White 9/5/2024 4:56 PM   Dictation workstation:   [id number]            Physical Exam    Constitutional   General appearance: Alert   Pulmonary   Respiratory assessment: No respiratory distress, normal respiratory rhythm and effort.    Auscultation of Lungs: Clear bilateral  breath sounds.   Cardiovascular   Auscultation of heart: Apical pulse normal, heart rate and rhythm normal, normal S1 and S2, no murmurs and no pericardial rub.    Exam for edema: No peripheral edema.   Abdomen   Abdominal Exam: No bruits, normal bowel sounds, soft, non-tender, no abdominal mass palpated.    Liver and Spleen exam: No hepato-splenomegaly.   Musculoskeletal   Significant tenderness in the left lower extremity with some swelling  Neurologic   Cranial nerves: Nerves 2-12 were intact, no focal neuro defects.         Assessment/Plan      #Gram-negative bacteremia  Septic arthritis of left knee  S/p I&D  Continue IV antibiotics  Will need 4 weeks of IV antibiotics  Because of uncontrolled fevers getting additional workup    # Hypertension  Continue current medications    #Diabetes mellitus  Continue metformin    *Hypokalemia\  Replenish

## 2024-09-16 NOTE — PROGRESS NOTES
Gabe Linder  is a 39 y.o. male     Patient agreeable to go to SNF  Struggling with therapy with severe pain in the knee  Also did have fever hence dw ID to reeval and hold dc    Review of Systems     Constitutional: Feeling ok  Cardiovascular: no chest pain   Respiratory: no cough, wheezing or shortness of breath a  Gastrointestinal: no abdominal pain, no constipation, no melena, no does have constipatoin   Neurological: no headache,   All other systems have been reviewed and are negative for complaint.       Vitals:   BP: 131/77   Pulse: 97   Resp: 17   Temp: 36.9 °C (98.5 °F)   SpO2: 96%        Scheduled medications  amLODIPine, 10 mg, oral, Daily  aspirin, 81 mg, oral, BID  cefTRIAXone, 2 g, intravenous, q24h  cyclobenzaprine, 5 mg, oral, TID  [Held by provider] docusate sodium, 100 mg, oral, BID  insulin lispro, 0-5 Units, subcutaneous, Before meals & nightly  lidocaine, 5 mL, infiltration, Once  lisinopril, 20 mg, oral, Daily  metFORMIN, 500 mg, oral, BID  pantoprazole, 40 mg, oral, Daily before breakfast   Or  pantoprazole, 40 mg, intravenous, Daily before breakfast  [Held by provider] polyethylene glycol, 17 g, oral, Daily      Continuous medications       PRN medications  PRN medications: acetaminophen **OR** acetaminophen **OR** acetaminophen, alum-mag hydroxide-simeth, guaiFENesin, hydrALAZINE, loperamide, melatonin, ondansetron **OR** ondansetron, oxyCODONE, oxyCODONE    Lab Review   Results from last 7 days   Lab Units 09/15/24  0634 09/14/24  0752 09/13/24  0458   WBC AUTO x10*3/uL 17.4* 19.2* 17.2*   HEMOGLOBIN g/dL 10.9* 11.6* 12.5*   HEMATOCRIT % 32.1* 34.1* 36.8*   PLATELETS AUTO x10*3/uL 521* 601* 544*     Results from last 7 days   Lab Units 09/15/24  0634 09/14/24  0752 09/13/24  1636 09/13/24  0458   SODIUM mmol/L 134* 136  --  137   POTASSIUM mmol/L 3.5 3.2* 3.4* 2.9*   CHLORIDE mmol/L 95* 97*  --  97*   CO2 mmol/L 26 30  --  29   BUN mg/dL 16 8  --  9   CREATININE mg/dL 1.44* 0.89  --   0.72   CALCIUM mg/dL 9.0 8.9  --  9.0   GLUCOSE mg/dL 125* 152*  --  137*              Bedside Midline Imaging   Final Result      XR chest 1 view   Final Result   No airspace consolidation or pleural effusion.        MACRO:   None        Signed by: Haja Johnson 9/10/2024 2:53 AM   Dictation workstation:   [id number]      XR knee left 3 views   Final Result   Large suprapatellar effusion.        No acute fracture or dislocation.        MACRO:   None        Signed by: Tyson Yu 9/10/2024 8:14 AM   Dictation workstation:   [id number]      CT thoracic spine w IV contrast   Final Result   No bony abnormality or soft tissue inflammation to suggest   osteomyelitis or discitis are noted.        No stenoses of the thoracic spine are noted.        MACRO:   None        Signed by: Regino Kurtz 9/9/2024 10:14 AM   Dictation workstation:   [id number]      CT lumbar spine w IV contrast   Final Result   Addendum (preliminary) 1 of 1   Interpreted By:  Regino Kurtz,    ADDENDUM:   Enhanced images of the lumbar spine were obtained with coronal and   sagittal reconstructions using 75 mL Omnipaque 350.        The paraspinous and spinal canal soft tissues enhance normally with   no abnormality suggesting infection noted.        Signed by: Regino Kurtz 9/9/2024 10:15 AM        -------- ORIGINAL REPORT --------   Dictation workstation:   [id number]      Final   No bony erosions or inflammatory soft tissue changes suggestive of   osteomyelitis/discitis in the lumbar spine are noted.        MACRO:   None        Signed by: Regino Kurtz 9/9/2024 10:11 AM   Dictation workstation:   [id number]      MR kidney wo IV contrast   Final Result   The questioned left renal lesion (2 cm) likely corresponds to a   hemorrhagic/proteinaceous cyst within limits of noncontrast enhanced   examination.        Hepatic steatosis. Hepatosplenomegaly.        MACRO   None        Signed by: Breana Pimentel 9/9/2024 8:11 AM   Dictation workstation:    [id number]      Lower extremity venous duplex bilateral   Final Result   No sonographic evidence for deep vein thrombosis within the evaluated   veins of the bilateral lower extremities.        MACRO:   None        Signed by: Jeffery Mendez 9/9/2024 5:52 AM   Dictation workstation:   [id number]      CT head wo IV contrast   Final Result   1. No acute intracranial abnormality identified.   2. Small chronic appearing defect along the left parietal calvarium   with overlying soft tissue density/scarring. Correlate for prior   procedure such as [place] hole at this site.             If there is concern for ongoing intracranial abnormality then MRI may   be performed for further follow-up.        MACRO:   None        Signed by: Sha Lombardo 9/5/2024 7:35 PM   Dictation workstation:   [id number]      CT chest abdomen pelvis wo IV contrast   Final Result   1. Hepatomegaly and hepatic steatosis.   2. Soft tissue attenuating exophytic lesion arising from the   posterior aspect of the left kidney, incompletely characterized on   this exam. Recommend follow-up renal mass CT or MRI for further   assessment.        MACRO:   None        Signed by: Sha Lombardo 9/5/2024 7:53 PM   Dictation workstation:   [id number]      XR chest 2 views   Final Result   No acute pathologic findings are identified on this exam.        MACRO:   none        Signed by: Gabe White 9/5/2024 4:56 PM   Dictation workstation:   [id number]      XR chest 1 view    (Results Pending)         Physical Exam    Constitutional   General appearance: Alert   Pulmonary   Respiratory assessment: No respiratory distress, normal respiratory rhythm and effort.    Auscultation of Lungs: Clear bilateral breath sounds.   Cardiovascular   Auscultation of heart: Apical pulse normal, heart rate and rhythm normal, normal S1 and S2, no murmurs and no pericardial rub.    Exam for edema: No peripheral edema.   Abdomen   Abdominal Exam: No bruits, normal bowel sounds,  soft, non-tender, no abdominal mass palpated.    Liver and Spleen exam: No hepato-splenomegaly.   Musculoskeletal   Left knee in wrap  Neurologic   Cranial nerves: Nerves 2-12 were intact, no focal neuro defects.         Assessment/Plan      #Gram-negative bacteremia  Septic arthritis of left knee  S/p I&D  Continue IV antibiotics  Will need 4 weeks of IV antibiotics    #Uncontrolled hypertension  Increase lisinopril    #Hypokalemia  Replenish    #Diarrhea  Stool cultures negative      #New onset diabetes  metformin          #Generalized myalgias and arthralgias  Elevated CRP RP and ESR noted  Likely brought on by the sepsis    B P control is better    Cont with current   ID following dw ID  Dc plan once cleared by ID

## 2024-09-16 NOTE — CARE PLAN
The patient's goals for the shift include Pt. will have a safe, restful and uneventful evening    The clinical goals for the shift include Patient will remain afebrile by end of shift today.    Over the shift, the patient did make progress toward the following goals. Barriers to progression include . Recommendations to address these barriers include .

## 2024-09-16 NOTE — PROGRESS NOTES
"  INFECTIOUS DISEASE DAILY PROGRESS NOTE    SUBJECTIVE:    Called back over the weekend due to fever - 103 consistent. WBC is coming down now. Fever curve is getting better now. Blood cx x1 9/15 pending. COVID negative. CXR clear. No new symptoms. No other joint pains.     OBJECTIVE:  VITALS (Last 24 Hours)  /76 (BP Location: Left arm, Patient Position: Lying)   Pulse 106   Temp 36.6 °C (97.8 °F) (Temporal)   Resp 16   Ht 1.803 m (5' 11\")   Wt 95.3 kg (210 lb 1.6 oz)   SpO2 95%   BMI 29.30 kg/m²     PHYSICAL EXAM:  Gen - NAD, in bed  Abd - soft, no ttp, BS present  MSK - left knee with surg dressing, drain has been removed  MSK - other joints all seem fine  Skin - no rash    ABX: IV CTX    LABS:  Lab Results   Component Value Date    WBC 15.2 (H) 09/16/2024    HGB 11.2 (L) 09/16/2024    HCT 33.5 (L) 09/16/2024    MCV 79 (L) 09/16/2024     (H) 09/16/2024     Lab Results   Component Value Date    GLUCOSE 125 (H) 09/16/2024    CALCIUM 8.6 09/16/2024     (L) 09/16/2024    K 3.4 (L) 09/16/2024    CO2 28 09/16/2024    CL 96 (L) 09/16/2024    BUN 15 09/16/2024    CREATININE 1.18 09/16/2024     Estimated Creatinine Clearance: 98 mL/min (by C-G formula based on SCr of 1.18 mg/dL).    IMAGING:  CXR 9/15  Impression:     1.  No evidence of acute cardiopulmonary process.     ASSESSMENT/PLAN:    H. Flu Bacteremia - unclear original source of infection but is improving  Left Knee Native Joint Septic Arthritis - ortho evaluated, s/p arthrocentesis 9/9 with 100cc cloudy fluid. 26K WBCs, negative crystal exam. S/p OR 9/10 for washout. No additional growth from OR cultures.  Exophytic Left Kidney Lesion - looks like a hemorrhagic or proteinaceous cyst on MRI  Leukocytosis - suspected reactive to OR and Decadron, improving  Fever - new fever on 9/14 up to 103. No rash to suggest drug reaction. No new symptoms. CXR clear, COVID negative.      Will add differential to AM CBC and also check LFTs.    IV CTX 2g " Q24H plan through 10/8/24. Midline in place.    On discharge - once weekly labs CBC/diff, CMP, ESR, CRP fax to Dr. Sidhu [phone].    Monitoring for adverse effects of abx such as rash/itching - none.     Will follow. Thanks!    Yifan Sidhu MD  [facility]  Office #[phone]

## 2024-09-16 NOTE — PROGRESS NOTES
Physical Therapy    Physical Therapy Treatment    Patient Name: Gabe Linder Jr.  MRN: [id number]  Department: [place] [address]  Room: [address] [place] [address]  Today's Date: 9/16/2024  Time Calculation  Start Time: 1342  Stop Time: 1410  Time Calculation (min): 28 min         Assessment/Plan   PT Assessment  End of Session Communication: Bedside nurse  Assessment Comment: pt able to walk in room  End of Session Patient Position: Alarm on, Bed, 3 rail up  PT Plan  Inpatient/Swing Bed or Outpatient: Inpatient  PT Plan  Treatment/Interventions: Transfer training, Bed mobility, Gait training  PT Plan: Ongoing PT  PT Frequency: 5 times per week  PT Discharge Recommendations: High intensity level of continued care  PT Recommended Transfer Status: Assist x1  PT - OK to Discharge: Yes (per POC)      General Visit Information:   PT  Visit  PT Received On: 09/16/24  General  Reason for Referral: Dificulty walking;  s/p left Knee I&D with Dr Mancia  Referred By: Dr. Monroy  Prior to Session Communication: Bedside nurse  Patient Position Received: Bed, 3 rail up, Alarm off, not on at start of session  Preferred Learning Style: auditory, kinesthetic, written  General Comment: pt agreeable to tx    Subjective   Precautions:  Precautions  LE Weight Bearing Status: Weight Bearing as Tolerated  Medical Precautions: Fall precautions    Vital Signs (Past 2hrs)                 Objective   Pain:  Pain Assessment  0-10 (Numeric) Pain Score: 8  Pain Type: Surgical pain  Pain Location: Knee  Pain Orientation: Left  Pain Descriptors: Throbbing  Pain Interventions: Medication (See MAR)  Cognition:  Cognition  Overall Cognitive Status: Within Functional Limits  Coordination:     Postural Control:  Static Sitting Balance  Static Sitting-Comment/Number of Minutes: pt performed at EOB with SBA, pt performed x5 min  Static Standing Balance  Static Standing-Comment/Number of Minutes: pt performed static standing balance with UE support at RW and  CGA,  x3  min  Extremity/Trunk Assessments:    Activity Tolerance:     Treatments:            Bed Mobility  Bed Mobility: Yes  Bed Mobility 1  Bed Mobility 1: Supine to sitting, Sitting to supine  Level of Assistance 1: Close supervision  Bed Mobility Comments 1: HOB elevated.  pt completed slowly    Ambulation/Gait Training  Ambulation/Gait Training Performed: Yes  Ambulation/Gait Training 1  Surface 1: Level tile  Device 1: Rolling walker  Gait Support Devices: Gait belt  Assistance 1: Contact guard  Quality of Gait 1: Antalgic  Comments/Distance (ft) 1: 15x2, 10x2 (pt performed with step through gait pattern, decreased step length/height.  pt req x2 standing rest break.  x1 seated rest break.  no overtLOB. increased time req)  Transfer 1  Technique 1: Sit to stand, Stand to sit  Transfer Device 1: Walker  Transfer Level of Assistance 1: Contact guard  Trials/Comments 1: vc/tc for hand placment on solid sitting surface and not RW. as well as LLE placement.  pt performed x3    Outcome Measures:  [facility] Basic Mobility  Turning from your back to your side while in a flat bed without using bedrails: A little  Moving from lying on your back to sitting on the side of a flat bed without using bedrails: A little  Moving to and from bed to chair (including a wheelchair): A little  Standing up from a chair using your arms (e.g. wheelchair or bedside chair): A little  To walk in hospital room: A little  Climbing 3-5 steps with railing: Total  Basic Mobility - Total Score: 16    Education Documentation  Mobility Training, taught by Darinel Fernandez PTA at 9/16/2024  4:27 PM.  Learner: Patient  Readiness: Acceptance  Method: Explanation  Response: Verbalizes Understanding    Education Comments  No comments found.        OP EDUCATION:  Outpatient Education  Education Comment: educated pt on importance of OOB activity    Encounter Problems       Encounter Problems (Active)       PT Problem       PT Goal 1 (Progressing)       Start:   09/12/24    Expected End:  09/26/24       Pt able to perform bed mobility modified independent.           PT Goal 2 (Progressing)       Start:  09/12/24    Expected End:  09/26/24       Pt able to complete all transfers with SBA.            PT Goal 3 (Progressing)       Start:  09/12/24    Expected End:  09/26/24       Pt able to ambulate 50 feet with front wheeled walker and SBA.              PT Problem       Patient will improve L knee AROM to 0-90 degrees to normalize gait pattern.        PT Goal 4 (Progressing)       Start:  09/12/24    Expected End:  09/26/24               Pain - Adult

## 2024-09-16 NOTE — NURSING NOTE
Pt discharge held over the weekend 2/2 fever. States pain is 7-8/10 especially when working with therapy. Blood sugars have been well controlled. Pt states he is understanding better about what to eat vs what not after doing some further research. He is anticipated to go to SNF for IVABX. No further questions at this time. Will follow while appropriate.

## 2024-09-17 ENCOUNTER — HOME CARE VISIT (OUTPATIENT)
Dept: HOME HEALTH SERVICES | Facility: HOME HEALTH | Age: 39
End: 2024-09-17

## 2024-09-17 VITALS
BODY MASS INDEX: 29.41 KG/M2 | OXYGEN SATURATION: 96 % | TEMPERATURE: 98.2 F | SYSTOLIC BLOOD PRESSURE: 122 MMHG | WEIGHT: 210.1 LBS | HEART RATE: 107 BPM | RESPIRATION RATE: 16 BRPM | DIASTOLIC BLOOD PRESSURE: 65 MMHG | HEIGHT: 71 IN

## 2024-09-17 LAB
ANION GAP SERPL CALC-SCNC: 15 MMOL/L (ref 10–20)
BUN SERPL-MCNC: 16 MG/DL (ref 6–23)
CALCIUM SERPL-MCNC: 8.7 MG/DL (ref 8.6–10.3)
CHLORIDE SERPL-SCNC: 94 MMOL/L (ref 98–107)
CO2 SERPL-SCNC: 28 MMOL/L (ref 21–32)
CREAT SERPL-MCNC: 1.14 MG/DL (ref 0.5–1.3)
EGFRCR SERPLBLD CKD-EPI 2021: 83 ML/MIN/1.73M*2
FUNGUS SPEC CULT: NORMAL
FUNGUS SPEC FUNGUS STN: NORMAL
GLUCOSE BLD MANUAL STRIP-MCNC: 143 MG/DL (ref 74–99)
GLUCOSE BLD MANUAL STRIP-MCNC: 147 MG/DL (ref 74–99)
GLUCOSE BLD MANUAL STRIP-MCNC: 167 MG/DL (ref 74–99)
GLUCOSE SERPL-MCNC: 123 MG/DL (ref 74–99)
HOLD SPECIMEN: NORMAL
POTASSIUM SERPL-SCNC: 3.6 MMOL/L (ref 3.5–5.3)
SODIUM SERPL-SCNC: 133 MMOL/L (ref 136–145)

## 2024-09-17 PROCEDURE — 2500000001 HC RX 250 WO HCPCS SELF ADMINISTERED DRUGS (ALT 637 FOR MEDICARE OP): Performed by: STUDENT IN AN ORGANIZED HEALTH CARE EDUCATION/TRAINING PROGRAM

## 2024-09-17 PROCEDURE — 2500000001 HC RX 250 WO HCPCS SELF ADMINISTERED DRUGS (ALT 637 FOR MEDICARE OP): Performed by: NURSE PRACTITIONER

## 2024-09-17 PROCEDURE — 80048 BASIC METABOLIC PNL TOTAL CA: CPT

## 2024-09-17 PROCEDURE — 2500000004 HC RX 250 GENERAL PHARMACY W/ HCPCS (ALT 636 FOR OP/ED): Performed by: NURSE PRACTITIONER

## 2024-09-17 PROCEDURE — 97535 SELF CARE MNGMENT TRAINING: CPT | Mod: GO

## 2024-09-17 PROCEDURE — 2500000001 HC RX 250 WO HCPCS SELF ADMINISTERED DRUGS (ALT 637 FOR MEDICARE OP): Performed by: INTERNAL MEDICINE

## 2024-09-17 PROCEDURE — 82947 ASSAY GLUCOSE BLOOD QUANT: CPT

## 2024-09-17 PROCEDURE — 2500000002 HC RX 250 W HCPCS SELF ADMINISTERED DRUGS (ALT 637 FOR MEDICARE OP, ALT 636 FOR OP/ED): Performed by: INTERNAL MEDICINE

## 2024-09-17 PROCEDURE — 99232 SBSQ HOSP IP/OBS MODERATE 35: CPT | Performed by: NURSE PRACTITIONER

## 2024-09-17 ASSESSMENT — COGNITIVE AND FUNCTIONAL STATUS - GENERAL
MOBILITY SCORE: 24
HELP NEEDED FOR BATHING: A LITTLE
DRESSING REGULAR LOWER BODY CLOTHING: A LOT
DAILY ACTIVITIY SCORE: 20
DAILY ACTIVITIY SCORE: 24
PERSONAL GROOMING: A LITTLE

## 2024-09-17 ASSESSMENT — PAIN - FUNCTIONAL ASSESSMENT
PAIN_FUNCTIONAL_ASSESSMENT: 0-10

## 2024-09-17 ASSESSMENT — PAIN SCALES - GENERAL
PAINLEVEL_OUTOF10: 8
PAINLEVEL_OUTOF10: 5 - MODERATE PAIN
PAINLEVEL_OUTOF10: 0 - NO PAIN

## 2024-09-17 ASSESSMENT — ACTIVITIES OF DAILY LIVING (ADL): HOME_MANAGEMENT_TIME_ENTRY: 17

## 2024-09-17 ASSESSMENT — PAIN SCALES - WONG BAKER: WONGBAKER_NUMERICALRESPONSE: HURTS EVEN MORE

## 2024-09-17 ASSESSMENT — PAIN DESCRIPTION - LOCATION
LOCATION: KNEE
LOCATION: KNEE

## 2024-09-17 ASSESSMENT — PAIN SCALES - PAIN ASSESSMENT IN ADVANCED DEMENTIA (PAINAD): TOTALSCORE: MEDICATION (SEE MAR)

## 2024-09-17 ASSESSMENT — PAIN DESCRIPTION - ORIENTATION: ORIENTATION: LEFT

## 2024-09-17 NOTE — PROGRESS NOTES
"  INFECTIOUS DISEASE DAILY PROGRESS NOTE    SUBJECTIVE:    No new issues. Feels fine. Pain improving in the left knee overall. No fevers now.    OBJECTIVE:  VITALS (Last 24 Hours)  /66 (BP Location: Left arm, Patient Position: Lying)   Pulse 99   Temp 36.8 °C (98.2 °F) (Oral)   Resp 16   Ht 1.803 m (5' 11\")   Wt 95.3 kg (210 lb 1.6 oz)   SpO2 95%   BMI 29.30 kg/m²     PHYSICAL EXAM:  Gen - NAD, in bed  MSK - left knee with surg dressing, drain has been removed  Skin - no rashes    ABX: IV CTX    LABS:  Lab Results   Component Value Date    WBC 15.2 (H) 09/16/2024    WBC 15.2 (H) 09/16/2024    HGB 11.2 (L) 09/16/2024    HGB 11.2 (L) 09/16/2024    HCT 33.5 (L) 09/16/2024    HCT 33.5 (L) 09/16/2024    MCV 79 (L) 09/16/2024    MCV 79 (L) 09/16/2024     (H) 09/16/2024     (H) 09/16/2024     Lab Results   Component Value Date    GLUCOSE 123 (H) 09/17/2024    CALCIUM 8.7 09/17/2024     (L) 09/17/2024    K 3.6 09/17/2024    CO2 28 09/17/2024    CL 94 (L) 09/17/2024    BUN 16 09/17/2024    CREATININE 1.14 09/17/2024     Estimated Creatinine Clearance: 101.5 mL/min (by C-G formula based on SCr of 1.14 mg/dL).    IMAGING:  CXR 9/15  Impression:     1.  No evidence of acute cardiopulmonary process.     ASSESSMENT/PLAN:    H. Flu Bacteremia - unclear original source of infection but is improving  Left Knee Native Joint Septic Arthritis - ortho evaluated, s/p arthrocentesis 9/9 with 100cc cloudy fluid. 26K WBCs, negative crystal exam. S/p OR 9/10 for washout. No additional growth from OR cultures.  Exophytic Left Kidney Lesion - looks like a hemorrhagic or proteinaceous cyst on MRI  Leukocytosis - suspected reactive to OR and Decadron, improving  Fever - new fever on 9/14 up to 103. No rash to suggest drug reaction. No new symptoms. CXR clear, COVID negative.      No additional infection suspected. Overall improving. OK to discharge today.    IV CTX 2g Q24H plan through 10/8/24. Midline in " place.    On discharge - once weekly labs CBC/diff, CMP, ESR, CRP fax to Dr. Sidhu [phone].    Monitoring for adverse effects of abx such as rash/itching - none.     Will sign off. Please call back with questions. Thanks! D/w rest of team    Yifan Sidhu MD  [facility]  Office #[phone]

## 2024-09-17 NOTE — PROGRESS NOTES
09/17/24 0725   Discharge Planning   Expected Discharge Disposition Rehab     Patient with fevers over weekend. ID with further workup. Plan for IV ceftriaxone through 10-8. RX attached in [facility]port. Current dc plan [facility], auth good through 9-19.    Addendum 1446- Patient med ready for dc. Transport requested by [facility]. Medical team and patient updated.

## 2024-09-17 NOTE — PROGRESS NOTES
Physical Therapy                 Therapy Communication Note    Patient Name: Gabe Linder Jr.  MRN: [id number]  Department: [place] [address]  Room: [address] [place] [address]  Today's Date: 9/17/2024     Discipline: Physical Therapy    Missed Visit Reason: Missed Visit Reason:  (pt  declined stating he was discharging shortly to rehab.)    Missed Time: Attempt    Comment:

## 2024-09-17 NOTE — ASSESSMENT & PLAN NOTE
Gabe Oneilfield Francis Is a 38 y/o Male PMH: erectile dysfunction,  presenting to the ED with multiple complaints including generalized body aches, nausea, vomiting/nausea, diarrhea and headaches. Patient subsequently admitted further further monitoring of above symptoms and ID consult.    #Sepsis  #Gram Negative Bacteremia H.Influenza  #Left knee pain  #Left knee Native Joint Septic Arthritis  -afebrile   -Ortho following;  -ID following;   -IV CTX2g Q24h, plan for 4 weeks of IV abx therapy via midline through 10/08/24.  -s/p left knee I&D  9/10  -PT/OT recomm. High intensity

## 2024-09-17 NOTE — PROGRESS NOTES
9/17/2024 3:17 PM SO notified that patient will be discharged at 3:30 PM to  Acute Rehab. Leora HORTON

## 2024-09-17 NOTE — PROGRESS NOTES
Occupational Therapy    OT Treatment    Patient Name: Gabe Linder Jr.  MRN: [id number]  Department: [facility] [address]  Room: [address] [place] [address]  Today's Date: 9/17/2024  Time Calculation  Start Time: 0922  Stop Time: 0939  Time Calculation (min): 17 min        Assessment:  OT Assessment: Pt progressing towards goals this date, SBA for toilet transfers and standing grooming tasks at sink. Use of FWW for mobility to/from restroom and for stabilization with grooming tasks at sink. Continue to recommend continued therapy at discharge.  Prognosis: Good  Barriers to Discharge: None  Evaluation/Treatment Tolerance: Patient tolerated treatment well  Medical Staff Made Aware: Yes  End of Session Communication: Bedside nurse  End of Session Patient Position: Up in chair, Alarm on  OT Assessment Results: Decreased ADL status, Decreased endurance, Decreased IADLs  Prognosis: Good  Barriers to Discharge: None  Evaluation/Treatment Tolerance: Patient tolerated treatment well  Medical Staff Made Aware: Yes  Strengths: Ability to acquire knowledge, Access to adaptive/assistive products, Attitude of self, Capable of completing ADLs semi/independent, Coping skills, Housing layout, Insight into problems  Plan:  Treatment Interventions: ADL retraining, Equipment evaluation/education, Compensatory technique education, Functional transfer training, UE strengthening/ROM, Endurance training  OT Frequency: 3 times per week  OT Discharge Recommendations: High intensity level of continued care  Equipment Recommended upon Discharge: Wheeled walker  OT Recommended Transfer Status: Assist of 1  OT - OK to Discharge: Yes  Treatment Interventions: ADL retraining, Equipment evaluation/education, Compensatory technique education, Functional transfer training, UE strengthening/ROM, Endurance training    Subjective   Previous Visit Info:  OT Last Visit  OT Received On: 09/17/24  General:  General  Reason for Referral: Dificulty walking;  s/p left Knee I&D with   Gavino  Past Medical History Relevant to Rehab:   Past Medical History:   Diagnosis Date    Arthritis, septic (Multi)     ETOH abuse     GERD (gastroesophageal reflux disease)     Haemophilus influenzae infection     HTN (hypertension)     Type 2 diabetes mellitus (Multi)        Prior to Session Communication: Bedside nurse  Patient Position Received: Bed, 3 rail up, Alarm off, not on at start of session  Preferred Learning Style: auditory, kinesthetic, written  General Comment: pt agreeable to tx  Precautions:  LE Weight Bearing Status: Weight Bearing as Tolerated  Medical Precautions: Fall precautions    Vital Signs (Past 2hrs)                 Pain:  Pain Assessment  Pain Assessment: 0-10  0-10 (Numeric) Pain Score: 8  Pain Type: Acute pain  Pain Location: Knee  Pain Orientation: Left  Pain Interventions: Repositioned, Ambulation/increased activity    Objective    Cognition:  Cognition  Overall Cognitive Status: Within Functional Limits  Attention: Within Functional Limits  Memory: Within Funtional Limits  Safety/Judgement: Within Functional Limits  Insight: Within function limits  Impulsive: Within functional limits  Coordination:  Movements are Fluid and Coordinated: Yes  Activities of Daily Living: Grooming  Grooming Level of Assistance: Close supervision  Grooming Where Assessed: Standing sinkside  Grooming Comments: completed facial and oral hygiene standing at sink with use of FWW and SBA for safety. no LOB noted.    Toileting  Toileting Level of Assistance: Close supervision  Where Assessed: Toilet  Toileting Comments: grab bars to assist with transfer, SBA for safety; simulated.  Functional Standing Tolerance:  Time: ~4-5 minutes  Activity: grooming at sink  Functional Standing Tolerance Comments: good safety awareness, use of FWW for stability  Bed Mobility/Transfers: Bed Mobility  Bed Mobility: Yes  Bed Mobility 1  Bed Mobility 1: Supine to sitting  Level of Assistance 1: Distant supervision  Bed  Mobility Comments 1: HOB elevated, pt assisting LLE with use of BUE to advance towards EOB    Transfers  Transfer: Yes  Transfer 1  Technique 1: Sit to stand, Stand to sit  Transfer Device 1: Walker  Transfer Level of Assistance 1: Close supervision  Trials/Comments 1: from EOB    Toilet Transfers  Toilet Transfer to: Standard toilet  Toilet Transfer Technique: Ambulating  Toilet Transfers: Supervision  Toilet Transfers Comments: grab bars to assist    Functional Mobility:  Functional Mobility  Functional Mobility Performed: Yes  Functional Mobility 1  Surface 1: Level tile  Device 1: Rolling walker  Assistance 1: Close supervision  Comments 1: to/from restroom, no LOB noted. increased time needed  Sitting Balance:  Static Sitting Balance  Static Sitting-Balance Support: Feet supported  Static Sitting-Level of Assistance: Distant supervision  Dynamic Sitting Balance  Dynamic Sitting-Balance Support: Feet supported  Dynamic Sitting-Level of Assistance: Distant supervision  Dynamic Sitting-Balance: Lateral lean, Forward lean  Standing Balance:  Static Standing Balance  Static Standing-Balance Support: No upper extremity supported  Static Standing-Level of Assistance: Close supervision  Dynamic Standing Balance  Dynamic Standing-Balance Support: Bilateral upper extremity supported  Dynamic Standing-Level of Assistance: Close supervision  Dynamic Standing-Balance: Turning            Other Activity:       RUE   RUE : Within Functional Limits and LUE   LUE: Within Functional Limits        Outcome Measures:[facility] Daily Activity  Putting on and taking off regular lower body clothing: A lot  Bathing (including washing, rinsing, drying): A little  Putting on and taking off regular upper body clothing: None  Toileting, which includes using toilet, bedpan or urinal: None  Taking care of personal grooming such as brushing teeth: A little  Eating Meals: None  Daily Activity - Total Score: 20        Education Documentation  Body  Mechanics, taught by Kareen Lo OT at 9/17/2024 10:20 AM.  Learner: Patient  Readiness: Acceptance  Method: Explanation  Response: Verbalizes Understanding    Precautions, taught by Kareen Lo OT at 9/17/2024 10:20 AM.  Learner: Patient  Readiness: Acceptance  Method: Explanation  Response: Verbalizes Understanding    ADL Training, taught by Kareen Lo OT at 9/17/2024 10:20 AM.  Learner: Patient  Readiness: Acceptance  Method: Explanation  Response: Verbalizes Understanding    Education Comments  No comments found.        OP EDUCATION:       Goals:  Encounter Problems       Encounter Problems (Active)       ADLs       Patient will perform UB and LB bathing with modified independent level of assistance. (Progressing)       Start:  09/12/24            Patient with complete upper body dressing with independent level of assistance donning and doffing all UE clothes with no adaptive equipment. (Progressing)       Start:  09/12/24            Patient with complete lower body dressing with modified independent level of assistance donning and doffing all LE clothes  with PRN adaptive equipment. (Progressing)       Start:  09/12/24            Patient will complete daily grooming tasks brushing teeth and washing face/hair with independent level of assistance and PRN adaptive equipment. (Progressing)       Start:  09/12/24            Patient will complete toileting including hygiene clothing management/hygiene with independent level of assistance. (Progressing)       Start:  09/12/24               BALANCE       Pt will maintain dynamic standing balance during ADL task with modified independent level of assistance in order to demonstrate decreased risk of falling and improved postural control. (Progressing)       Start:  09/12/24               MOBILITY       Patient will perform Functional mobility mod  Household distances/Community Distances with modified independent level of assistance and least restrictive  device in order to improve safety and functional mobility. (Progressing)       Start:  09/12/24               TRANSFERS       Patient will complete sit to stand transfer with independent level of assistance and least restrictive device in order to improve safety and prepare for out of bed mobility. (Progressing)       Start:  09/12/24

## 2024-09-17 NOTE — PROGRESS NOTES
"Gabe Linder Jr. is a 39 y.o. male on day 13 of admission presenting with Sepsis (Multi).    Subjective   Patient alert. VSS on RA. No complaints or concerns this am.  Objective     Physical Exam  Vitals reviewed.   HENT:      Head: Normocephalic.      Right Ear: Tympanic membrane normal.      Nose: Nose normal.      Mouth/Throat:      Mouth: Mucous membranes are moist.   Eyes:      Pupils: Pupils are equal, round, and reactive to light.   Cardiovascular:      Rate and Rhythm: Normal rate.   Pulmonary:      Effort: Pulmonary effort is normal.   Abdominal:      General: Abdomen is flat.   Genitourinary:     Comments: deferred  Musculoskeletal:         General: Normal range of motion.      Cervical back: Normal range of motion.      Comments: Left knee wrapped in ace   Skin:     General: Skin is warm.      Capillary Refill: Capillary refill takes less than 2 seconds.   Neurological:      General: No focal deficit present.      Mental Status: He is alert and oriented to person, place, and time. Mental status is at baseline.   Psychiatric:         Mood and Affect: Mood normal.         Behavior: Behavior normal.         Thought Content: Thought content normal.         Judgment: Judgment normal.         Last Recorded Vitals  Blood pressure 131/66, pulse 99, temperature 36.8 °C (98.2 °F), temperature source Oral, resp. rate 16, height 1.803 m (5' 11\"), weight 95.3 kg (210 lb 1.6 oz), SpO2 95%.  Intake/Output last 3 Shifts:  I/O last 3 completed shifts:  In: 600 (6.3 mL/kg) [P.O.:600]  Out: 1850 (19.4 mL/kg) [Urine:1850 (0.5 mL/kg/hr)]  Weight: 95.3 kg     Relevant Results  Results for orders placed or performed during the hospital encounter of 09/05/24 (from the past 24 hour(s))   POCT GLUCOSE   Result Value Ref Range    POCT Glucose 155 (H) 74 - 99 mg/dL   POCT GLUCOSE   Result Value Ref Range    POCT Glucose 133 (H) 74 - 99 mg/dL   Basic metabolic panel   Result Value Ref Range    Glucose 123 (H) 74 - 99 mg/dL    " Sodium 133 (L) 136 - 145 mmol/L    Potassium 3.6 3.5 - 5.3 mmol/L    Chloride 94 (L) 98 - 107 mmol/L    Bicarbonate 28 21 - 32 mmol/L    Anion Gap 15 10 - 20 mmol/L    Urea Nitrogen 16 6 - 23 mg/dL    Creatinine 1.14 0.50 - 1.30 mg/dL    eGFR 83 >60 mL/min/1.73m*2    Calcium 8.7 8.6 - 10.3 mg/dL   Lavender Top   Result Value Ref Range    Extra Tube Hold for add-ons.    POCT GLUCOSE   Result Value Ref Range    POCT Glucose 143 (H) 74 - 99 mg/dL   POCT GLUCOSE   Result Value Ref Range    POCT Glucose 147 (H) 74 - 99 mg/dL       Assessment/Plan   Assessment & Plan  Sepsis (Multi)    Bacterial infection of knee joint (Multi)  Gabe Linder Jr. Is a 38 y/o Male PMH: erectile dysfunction,  presenting to the ED with multiple complaints including generalized body aches, nausea, vomiting/nausea, diarrhea and headaches. Patient subsequently admitted further further monitoring of above symptoms and ID consult.    #Sepsis  #Gram Negative Bacteremia H.Influenza  #Left knee pain  #Left knee Native Joint Septic Arthritis  -afebrile   -Ortho following;  -ID following;   -IV CTX2g Q24h, plan for 4 weeks of IV abx therapy via midline through 10/08/24.  -s/p left knee I&D  9/10  -PT/OT recomm. High intensity      #Exophytic left kidney lesion  -MRI kidney-looks like hemorrhagic or proteinaceous cyst on MRI  -monitor for now      #HTN  -last recorded bp 131/66  -Continue home medications including amlodipine 10mg PO daily, and lisinopril 20mg PO daily      #DM II  -Accucheck per protocol  -Continue home medications including Metformin  -stable      #DVT prophylaxis  -low risk    DC plan:  DC to SNF ([facility]) when medically stable. Anticipate dc today after ID clearance. Interdisciplinary rounding completed with Attending Provider, Bedside RN and TCC.  Labs, results and plan of care discussed and reviewed with .         I spent 20 minutes in the professional and overall care of this patient.  Yaneth Shell, APRN-CNP

## 2024-09-17 NOTE — PROGRESS NOTES
"Music Therapy Note    Therapy Session  Referral Type: New referral this admission  Visit Type: Follow-up visit  Session Start Time: 1527  Session End Time: 1534  Intervention Delivery: In-person  Conflict of Service: None  Family Present for Session: None     Pre-assessment  Unable to Assess Reason: Outcomes not assessed  Pain Score: 8  Mood/Affect: Cooperative  Verbalized Emotional State: Acceptance         Treatment/Interventions  Areas of Focus: Emotional support  Music Therapy Interventions: Empathic listening/validating emotions    Post-assessment  Unable to Assess Reason: Outcomes not evaluated  Verbalized Emotional State: Gratitude  Total Session Time (min): 7 minutes    Narrative  Assessment Detail: Upon arrival, MTI found pt awake in bed, looking at phone. Upon assessment, pt shared that his pain was still high, though expressed disinterest in services, as he was getting discharged very soon. Pt shared appreciation for visit and began discussing concerns and challenges with hospitalization and discharge.  Plan: MTI engaged pt in social conversation to offer emotional support and validation through pt's transition to discharge.  Intervention: Pt participated by discussing his experience in the hospital and his medical condition. MTI offered empathic support through attunement to pt and verbal validation.  Evaluation: Pt responded to intervention by making plans to care for himself while at home. Pt shared his mother's wisdom \"if you don't make time for yourself, the universe will make time for you,\" and expressed that is what he experienced in this admission. Pt shared struggles with coming to terms with not being able to be as active as he once was. Pt shared interest in finding a hobby or activity that supports his emotional and physical wellbeing, but said that he \"doesn't even know what he enjoys.\" Through discussion about interest and passion for music, pt concluded that he is going to learn to play the " ning after discharge as a resource to support his overall health and wellbeing.  Follow-up: MTI will follow up, pending discharge.          Expressive Therapies Note

## 2024-09-17 NOTE — DISCHARGE SUMMARY
Discharge Diagnosis  Sepsis (Multi)    Issues Requiring Follow-Up  IV antibiotics for 4 weeks with follow-up with orthopedics and infectious disease    Test Results Pending At Discharge  Pending Labs       Order Current Status    Extra Urine Gray Tube Collected (09/06/24 0204)    Urinalysis with Reflex Culture and Microscopic In process    Blood Culture Preliminary result    Fungal Culture/Smear Preliminary result            Hospital Course  Patient with a past medical history of erectile dysfunction was doing well until a few days ago when he started having generalized aches pains and stiffness  Had a couple of bouts of loose stool but no other symptoms  No dysuria or frequency chest pain shortness of breath cough  Because of the persistent pain and stiffness came to the emergency room he was noted to have elevated white counts  Patient continued to have fevers and generalized pains  After multiple tests the source was localized to his left knee  It was drained and was revealed to be septic  Orthopedics was involved  After discussion infectious disease a PICC line was put in for 4 weeks of antibiotics  He will be discharged to skilled nursing facility for continued antibiotics and therapy  Also has a new onset diagnosis of diabetes  For which we started the patient on metformin  Will discharge to skilled nursing facility in stable condition       Pertinent Physical Exam At Time of Discharge  Physical Exam    Constitutional   General appearance: Alert and in no acute distress.     Pulmonary   Respiratory assessment: No respiratory distress, normal respiratory rhythm and effort.    Auscultation of Lungs: Clear bilateral breath sounds.   Cardiovascular   Auscultation of heart: Apical pulse normal, heart rate and rhythm normal, normal S1 and S2, no murmurs and no pericardial rub.    Exam for edema: No peripheral edema.   Abdomen   Abdominal Exam: No bruits, normal bowel sounds, soft, non-tender, no abdominal mass  palpated.    Liver and Spleen exam: No hepato-splenomegaly.   Musculoskeletal     Inspection of digits and nails: No clubbing or cyanosis of the fingernails.    Inspection/palpation of joints, bones and muscles: Tender knee  Skin   Skin inspection: Normal skin color and pigmentation, normal skin turgor and no visible rash.   Neurologic   Cranial nerves: Nerves 2-12 were intact, no focal neuro defects.    Home Medications     Medication List      START taking these medications     acetaminophen 325 mg tablet; Commonly known as: Tylenol; Take 2 tablets   (650 mg) by mouth every 4 hours if needed for mild pain (1 - 3).   alcohol swabs pads, medicated; Check blood glucose 3-4 times a day   amLODIPine 10 mg tablet; Commonly known as: Norvasc; Take 1 tablet (10   mg) by mouth once daily.   aspirin 81 mg chewable tablet; Chew 1 tablet (81 mg) 2 times a day.   blood sugar diagnostic strip; Check blood glucose 3-4 times a day   blood-glucose meter misc; Check blood glucose 3-4 times a day   cefTRIAXone 2 gram/50 mL IV; Commonly known as: Rocephin; Infuse 50 mL   (2 g) at 100 mL/hr over 30 minutes into a venous catheter once every 24   hours for 26 days. Once weekly labs CBC/diff, CMP, ESR, CRP fax to Dr. Sidhu [phone]. Stop date 10/8/24.   cyclobenzaprine 5 mg tablet; Commonly known as: Flexeril; Take 1 tablet   (5 mg) by mouth 3 times a day.   lancets 33 gauge misc; Check blood glucose 3-4 times a day   lisinopril 20 mg tablet; Take 1 tablet (20 mg) by mouth once daily. Do   not fill before September 14, 2024.   loperamide 2 mg capsule; Commonly known as: Imodium; Take 1 capsule (2   mg) by mouth 4 times a day as needed for diarrhea.   metFORMIN 500 mg tablet; Commonly known as: Glucophage; Take 1 tablet   (500 mg) by mouth 2 times daily (morning and late afternoon).   ondansetron ODT 4 mg disintegrating tablet; Commonly known as:   Zofran-ODT; Take 1 tablet (4 mg) by mouth every 8 hours if needed for   nausea or  vomiting.   oxyCODONE 5 mg immediate release tablet; Commonly known as: Roxicodone;   Take 1 tablet (5 mg) by mouth every 4 hours if needed for moderate pain (4   - 6) or severe pain (7 - 10).       Outpatient Follow-Up  No future appointments.    Patient seen at bedside. Events from the last visit reviewed. Discussed with staff. Results of tests and investigations from last visit reviewed and discussed with patient/Family. Electronic chart on [facility] reviewed. Input / Recommendations  from consultants  appreciated and reviewed and agreed with.     discharge summary and profile completed. medications reviewed and discussed with patient and family.  scripts completed and signed.     total discharge time in excess of 30 minutes.    Lio Monroy MD

## 2024-09-17 NOTE — PROGRESS NOTES
"Music Therapy Note    Therapy Session  Referral Type: Pain rounds  Visit Type: New visit  Session Start Time: 1136  Session End Time: 1139  Intervention Delivery: In-person  Conflict of Service: None  Family Present for Session: None     Pre-assessment  Unable to Assess Reason: Outcomes not assessed  Mood/Affect: Cooperative  Verbalized Emotional State: Acceptance         Treatment/Interventions  Music Therapy Interventions: Assessment    Post-assessment  Unable to Assess Reason: Did not provide expressive therapy intervention  Total Session Time (min): 3 minutes    Narrative  Assessment Detail: Upon arrival, MTI found pt in bed, scrolling through social media on phone. Upon assessment, pt expressed his pain being \"alright\" and demonstrated interest in services. During conversation, pt continued fidgeting with phone and cups near bedside, constantly switching posititions in bed. MTI introduced services to pt for pain management. Pt was agreeable to services, sharing that he likes all kinds of music, other than [place].  Follow-up: MT/MTI will follow up as able.    Education Documentation  Relaxation, taught by Thi Marie at 9/17/2024 12:48 PM.  Learner: Patient  Readiness: Acceptance  Method: Explanation  Response: Verbalizes Understanding    Focal Points, taught by Thi Marie at 9/17/2024 12:48 PM.  Learner: Patient  Readiness: Acceptance  Method: Explanation  Response: Verbalizes Understanding    Pain Management, taught by Thi Marie at 9/17/2024 12:48 PM.  Learner: Patient  Readiness: Acceptance  Method: Explanation  Response: Verbalizes Understanding            "

## 2024-09-17 NOTE — CARE PLAN
The patient's goals for the shift include Pt. will have a safe, restful and uneventful evening    The clinical goals for the shift include pain management      Problem: Fall/Injury  Goal: Not fall by end of shift  Outcome: Progressing     Problem: Pain - Adult  Goal: Verbalizes/displays adequate comfort level or baseline comfort level  Outcome: Progressing     Problem: Safety - Adult  Goal: Free from fall injury  Outcome: Progressing     Problem: Discharge Planning  Goal: Discharge to home or other facility with appropriate resources  Outcome: Met     Problem: Chronic Conditions and Co-morbidities  Goal: Patient's chronic conditions and co-morbidity symptoms are monitored and maintained or improved  Outcome: Progressing

## 2024-09-17 NOTE — CARE PLAN
The patient's goals for the shift include Pt. will have a safe, restful and uneventful evening    The clinical goals for the shift include pain management    Problem: Fall/Injury  Goal: Not fall by end of shift  Outcome: Progressing  Goal: Be free from injury by end of the shift  Outcome: Progressing  Goal: Verbalize understanding of personal risk factors for fall in the hospital  Outcome: Progressing  Goal: Verbalize understanding of risk factor reduction measures to prevent injury from fall in the home  Outcome: Progressing  Goal: Use assistive devices by end of the shift  Outcome: Progressing  Goal: Pace activities to prevent fatigue by end of the shift  Outcome: Progressing     Problem: Pain - Adult  Goal: Verbalizes/displays adequate comfort level or baseline comfort level  Outcome: Progressing     Problem: Diabetes  Goal: Achieve decreasing blood glucose levels by end of shift  Outcome: Progressing  Goal: Increase stability of blood glucose readings by end of shift  Outcome: Progressing  Goal: Decrease in ketones present in urine by end of shift  Outcome: Progressing  Goal: Maintain electrolyte levels within acceptable range throughout shift  Outcome: Progressing  Goal: Maintain glucose levels >70mg/dl to <250mg/dl throughout shift  Outcome: Progressing  Goal: No changes in neurological exam by end of shift  Outcome: Progressing  Goal: Learn about and adhere to nutrition recommendations by end of shift  Outcome: Progressing  Goal: Vital signs within normal range for age by end of shift  Outcome: Progressing  Goal: Increase self care and/or family involovement by end of shift  Outcome: Progressing  Goal: Receive DSME education by end of shift  Outcome: Progressing

## 2024-09-18 ENCOUNTER — LAB REQUISITION (OUTPATIENT)
Dept: LAB | Facility: HOSPITAL | Age: 39
End: 2024-09-18
Payer: COMMERCIAL

## 2024-09-19 LAB — BACTERIA BLD CULT: NORMAL

## 2024-09-22 ENCOUNTER — HOME HEALTH ADMISSION (OUTPATIENT)
Dept: HOME HEALTH SERVICES | Facility: HOME HEALTH | Age: 39
End: 2024-09-22
Payer: COMMERCIAL

## 2024-09-23 ENCOUNTER — LAB REQUISITION (OUTPATIENT)
Dept: LAB | Facility: HOSPITAL | Age: 39
End: 2024-09-23
Payer: COMMERCIAL

## 2024-09-23 ENCOUNTER — TELEPHONE (OUTPATIENT)
Dept: PRIMARY CARE | Facility: CLINIC | Age: 39
End: 2024-09-23

## 2024-09-23 ENCOUNTER — DOCUMENTATION (OUTPATIENT)
Dept: HOME HEALTH SERVICES | Facility: HOME HEALTH | Age: 39
End: 2024-09-23

## 2024-09-23 DIAGNOSIS — Z51.89 ENCOUNTER FOR OTHER SPECIFIED AFTERCARE: ICD-10-CM

## 2024-09-23 LAB
ALBUMIN SERPL BCP-MCNC: 3.3 G/DL (ref 3.4–5)
ANION GAP SERPL CALC-SCNC: 17 MMOL/L (ref 10–20)
BUN SERPL-MCNC: 13 MG/DL (ref 6–23)
CALCIUM SERPL-MCNC: 9.3 MG/DL (ref 8.6–10.3)
CHLORIDE SERPL-SCNC: 98 MMOL/L (ref 98–107)
CO2 SERPL-SCNC: 28 MMOL/L (ref 21–32)
CREAT SERPL-MCNC: 1 MG/DL (ref 0.5–1.3)
EGFRCR SERPLBLD CKD-EPI 2021: >90 ML/MIN/1.73M*2
FUNGUS SPEC CULT: NORMAL
FUNGUS SPEC FUNGUS STN: NORMAL
GLUCOSE SERPL-MCNC: 112 MG/DL (ref 74–99)
PHOSPHATE SERPL-MCNC: 5.7 MG/DL (ref 2.5–4.9)
POTASSIUM SERPL-SCNC: 4.6 MMOL/L (ref 3.5–5.3)
SODIUM SERPL-SCNC: 138 MMOL/L (ref 136–145)

## 2024-09-23 PROCEDURE — 80069 RENAL FUNCTION PANEL: CPT

## 2024-09-23 NOTE — HH CARE COORDINATION
Home Care received a Referral for Infusion, Nursing, Physical Therapy, and Occupational Therapy. We have processed the referral for a Start of Care on 9/25.     If you have any questions or concerns, please feel free to contact us at [phone]. Follow the prompts, enter your five digit zip code, and you will be directed to your care team on [facility].

## 2024-09-24 ENCOUNTER — HOME INFUSION (OUTPATIENT)
Dept: INFUSION THERAPY | Age: 39
End: 2024-09-24
Payer: COMMERCIAL

## 2024-09-24 ENCOUNTER — LAB REQUISITION (OUTPATIENT)
Dept: LAB | Facility: HOSPITAL | Age: 39
End: 2024-09-24
Payer: COMMERCIAL

## 2024-09-24 DIAGNOSIS — Z51.89 ENCOUNTER FOR OTHER SPECIFIED AFTERCARE: ICD-10-CM

## 2024-09-24 LAB
ALBUMIN SERPL BCP-MCNC: 3.4 G/DL (ref 3.4–5)
ANION GAP SERPL CALC-SCNC: 14 MMOL/L (ref 10–20)
BUN SERPL-MCNC: 16 MG/DL (ref 6–23)
CALCIUM SERPL-MCNC: 9.2 MG/DL (ref 8.6–10.3)
CHLORIDE SERPL-SCNC: 98 MMOL/L (ref 98–107)
CO2 SERPL-SCNC: 29 MMOL/L (ref 21–32)
CREAT SERPL-MCNC: 0.99 MG/DL (ref 0.5–1.3)
EGFRCR SERPLBLD CKD-EPI 2021: >90 ML/MIN/1.73M*2
GLUCOSE SERPL-MCNC: 121 MG/DL (ref 74–99)
PHOSPHATE SERPL-MCNC: 5.5 MG/DL (ref 2.5–4.9)
POTASSIUM SERPL-SCNC: 4.4 MMOL/L (ref 3.5–5.3)
SODIUM SERPL-SCNC: 137 MMOL/L (ref 136–145)

## 2024-09-24 PROCEDURE — 80069 RENAL FUNCTION PANEL: CPT

## 2024-09-24 NOTE — PROGRESS NOTES
REVIEWED PT INFO AS CORRECT.   DX...  SEPTIC KNEE ARTHRITIS  REVIEWED ALLERGIES... NKDA  PMH...NONE  NO MEDICATION INTERACTIONS.  REVIEWED RELEVANT BASELINE VALUES  LAB ARE ... WEEKLY CBC/D, CMP, ESR, CRP TO DR. MURRAY  PT HAS …..  MIDLINE (PLACED 09/12/245)...  FLUSH PER [facility] PROTOCOL.  CONTINUE MED THRU TENTATIVE STOP DATE …. 10/8/24  MED PLACED … MB+  CARE PLAN DONE PREVIOUSLY     PRABHATENKRISTINE BEING DISCHARGED FROM REHAB FACILITY ON IV CEFTRIAXONE FOR SEPTIC KNEE ARTHRITIS HEVER 10/8.  LESLIE MURRAY TO FOLLOW AT HOME.  SL MIDLINE FOR THERAPY.  REVIEW OF HOMEGOING MEDS AND NO INTERACTIONS OR DUPLICATIONS NOTED.  GOAL OF THERAPY IS TO RESOLVE INFECTION WHILE MONITORING FOR SIDE EFFECTS AS LISTED IN THE CP.    [facility] SPOKE WITH PATIENT AND VERIFIED ADDRESS AND PHONE NUMBER. AGREES TO DELIVERY OF CEFTRIAXONE BETWEEN 5-8PM TO HOME ADDRESS.  TO CALL BEFORE ARRIVAL.    PROCESSED FILL FOR 7 CEFTRIAXONE MB+ FOR 09/24/24 MIX AND STRAIGHT DEL.. FILL IS A 7 DAY SUPPLY  DOS 9/25 THRU 10/01/24.      NF FOR 9/30 FOR PATIENT PROGRESS AND LABS IF AVAILABLE. REFILL REMAINDER OF CEFTRIAXONE FOR OVN DEL.

## 2024-09-25 ENCOUNTER — HOME CARE VISIT (OUTPATIENT)
Dept: HOME HEALTH SERVICES | Facility: HOME HEALTH | Age: 39
End: 2024-09-25
Payer: COMMERCIAL

## 2024-09-25 VITALS
HEART RATE: 110 BPM | OXYGEN SATURATION: 97 % | TEMPERATURE: 98.9 F | DIASTOLIC BLOOD PRESSURE: 70 MMHG | SYSTOLIC BLOOD PRESSURE: 110 MMHG | RESPIRATION RATE: 18 BRPM

## 2024-09-25 PROCEDURE — G0299 HHS/HOSPICE OF RN EA 15 MIN: HCPCS

## 2024-09-25 ASSESSMENT — ENCOUNTER SYMPTOMS
APPETITE LEVEL: GOOD
MUSCLE WEAKNESS: 1
DENIES PAIN: 1
LIMITED RANGE OF MOTION: 1
LAST BOWEL MOVEMENT: 67108
CHANGE IN APPETITE: UNCHANGED
PERSON REPORTING PAIN: PATIENT

## 2024-09-25 ASSESSMENT — ACTIVITIES OF DAILY LIVING (ADL)
ENTERING_EXITING_HOME: SUPERVISION
OASIS_M1830: 01

## 2024-09-26 ENCOUNTER — HOME CARE VISIT (OUTPATIENT)
Dept: HOME HEALTH SERVICES | Facility: HOME HEALTH | Age: 39
End: 2024-09-26
Payer: COMMERCIAL

## 2024-09-26 PROCEDURE — G0299 HHS/HOSPICE OF RN EA 15 MIN: HCPCS

## 2024-09-27 ENCOUNTER — HOME INFUSION (OUTPATIENT)
Dept: INFUSION THERAPY | Age: 39
End: 2024-09-27
Payer: COMMERCIAL

## 2024-09-27 NOTE — PROGRESS NOTES
Chart Review: Patient ordered Ceftriaxone 2g IV q24 through 10/8 ID appt with Dr Sidhu for Left Knee Septic Arthritis    Labs to be drawn 9/30 by homecare RN  Message left for patient regarding delivery on 10/1    Dispense x7 doses of Ceftriaxone 2g in MB_+ for cmpd on 9/30 and delivery on 10/1  Infusions 10/2 through 10/8    POC 10/8

## 2024-09-30 ENCOUNTER — HOME CARE VISIT (OUTPATIENT)
Dept: HOME HEALTH SERVICES | Facility: HOME HEALTH | Age: 39
End: 2024-09-30
Payer: COMMERCIAL

## 2024-09-30 ENCOUNTER — LAB REQUISITION (OUTPATIENT)
Dept: LAB | Facility: HOSPITAL | Age: 39
End: 2024-09-30
Payer: COMMERCIAL

## 2024-09-30 VITALS
DIASTOLIC BLOOD PRESSURE: 70 MMHG | SYSTOLIC BLOOD PRESSURE: 120 MMHG | RESPIRATION RATE: 20 BRPM | OXYGEN SATURATION: 97 % | HEART RATE: 98 BPM | TEMPERATURE: 98.6 F

## 2024-09-30 DIAGNOSIS — M00.862 ARTHRITIS DUE TO OTHER BACTERIA, LEFT KNEE (MULTI): ICD-10-CM

## 2024-09-30 LAB
ALBUMIN SERPL BCP-MCNC: 4.1 G/DL (ref 3.4–5)
ALP SERPL-CCNC: 66 U/L (ref 33–120)
ALT SERPL W P-5'-P-CCNC: 17 U/L (ref 10–52)
ANION GAP SERPL CALC-SCNC: 13 MMOL/L (ref 10–20)
AST SERPL W P-5'-P-CCNC: 14 U/L (ref 9–39)
BASOPHILS # BLD AUTO: 0.09 X10*3/UL (ref 0–0.1)
BASOPHILS NFR BLD AUTO: 1.4 %
BILIRUB SERPL-MCNC: 0.3 MG/DL (ref 0–1.2)
BUN SERPL-MCNC: 11 MG/DL (ref 6–23)
CALCIUM SERPL-MCNC: 9.4 MG/DL (ref 8.6–10.3)
CHLORIDE SERPL-SCNC: 98 MMOL/L (ref 98–107)
CO2 SERPL-SCNC: 29 MMOL/L (ref 21–32)
CREAT SERPL-MCNC: 0.98 MG/DL (ref 0.5–1.3)
CRP SERPL-MCNC: 0.85 MG/DL
EGFRCR SERPLBLD CKD-EPI 2021: >90 ML/MIN/1.73M*2
EOSINOPHIL # BLD AUTO: 1.64 X10*3/UL (ref 0–0.7)
EOSINOPHIL NFR BLD AUTO: 24.9 %
ERYTHROCYTE [DISTWIDTH] IN BLOOD BY AUTOMATED COUNT: 13.5 % (ref 11.5–14.5)
ERYTHROCYTE [SEDIMENTATION RATE] IN BLOOD BY WESTERGREN METHOD: 70 MM/H (ref 0–15)
FUNGUS SPEC CULT: NORMAL
FUNGUS SPEC FUNGUS STN: NORMAL
GLUCOSE SERPL-MCNC: 146 MG/DL (ref 74–99)
HCT VFR BLD AUTO: 35.2 % (ref 41–52)
HGB BLD-MCNC: 11.5 G/DL (ref 13.5–17.5)
IMM GRANULOCYTES # BLD AUTO: 0.02 X10*3/UL (ref 0–0.7)
IMM GRANULOCYTES NFR BLD AUTO: 0.3 % (ref 0–0.9)
LYMPHOCYTES # BLD AUTO: 2.43 X10*3/UL (ref 1.2–4.8)
LYMPHOCYTES NFR BLD AUTO: 36.9 %
MCH RBC QN AUTO: 25.6 PG (ref 26–34)
MCHC RBC AUTO-ENTMCNC: 32.7 G/DL (ref 32–36)
MCV RBC AUTO: 78 FL (ref 80–100)
MONOCYTES # BLD AUTO: 0.39 X10*3/UL (ref 0.1–1)
MONOCYTES NFR BLD AUTO: 5.9 %
NEUTROPHILS # BLD AUTO: 2.02 X10*3/UL (ref 1.2–7.7)
NEUTROPHILS NFR BLD AUTO: 30.6 %
NRBC BLD-RTO: 0 /100 WBCS (ref 0–0)
PLATELET # BLD AUTO: 473 X10*3/UL (ref 150–450)
POTASSIUM SERPL-SCNC: 3.9 MMOL/L (ref 3.5–5.3)
PROT SERPL-MCNC: 7.1 G/DL (ref 6.4–8.2)
RBC # BLD AUTO: 4.5 X10*6/UL (ref 4.5–5.9)
SODIUM SERPL-SCNC: 136 MMOL/L (ref 136–145)
WBC # BLD AUTO: 6.6 X10*3/UL (ref 4.4–11.3)

## 2024-09-30 PROCEDURE — 85652 RBC SED RATE AUTOMATED: CPT

## 2024-09-30 PROCEDURE — 80053 COMPREHEN METABOLIC PANEL: CPT

## 2024-09-30 PROCEDURE — G0299 HHS/HOSPICE OF RN EA 15 MIN: HCPCS

## 2024-09-30 PROCEDURE — 86140 C-REACTIVE PROTEIN: CPT

## 2024-09-30 PROCEDURE — 85025 COMPLETE CBC W/AUTO DIFF WBC: CPT

## 2024-09-30 SDOH — ECONOMIC STABILITY: FOOD INSECURITY: MEALS PER DAY: 3

## 2024-09-30 ASSESSMENT — ENCOUNTER SYMPTOMS
PERSON REPORTING PAIN: PATIENT
CHANGE IN APPETITE: UNCHANGED
APPETITE LEVEL: FAIR
PAIN SEVERITY GOAL: 0/10
PAIN LOCATION - PAIN QUALITY: ACHY
PAIN: 1
SUBJECTIVE PAIN PROGRESSION: UNCHANGED
PAIN LOCATION: GENERALIZED
LOWEST PAIN SEVERITY IN PAST 24 HOURS: 0/10
PAIN LOCATION - PAIN SEVERITY: 0/10
PAIN LOCATION - PAIN FREQUENCY: INTERMITTENT
HIGHEST PAIN SEVERITY IN PAST 24 HOURS: 0/10

## 2024-10-01 ENCOUNTER — HOME CARE VISIT (OUTPATIENT)
Dept: HOME HEALTH SERVICES | Facility: HOME HEALTH | Age: 39
End: 2024-10-01
Payer: COMMERCIAL

## 2024-10-01 VITALS
DIASTOLIC BLOOD PRESSURE: 70 MMHG | SYSTOLIC BLOOD PRESSURE: 118 MMHG | OXYGEN SATURATION: 96 % | TEMPERATURE: 97.7 F | HEART RATE: 78 BPM

## 2024-10-01 PROCEDURE — G0151 HHCP-SERV OF PT,EA 15 MIN: HCPCS

## 2024-10-01 PROCEDURE — G0152 HHCP-SERV OF OT,EA 15 MIN: HCPCS

## 2024-10-01 ASSESSMENT — ACTIVITIES OF DAILY LIVING (ADL)
WASHING_LB_CURRENT_FUNCTION: INDEPENDENT
DRESSING_LB_CURRENT_FUNCTION: INDEPENDENT
WASHING_UPB_CURRENT_FUNCTION: INDEPENDENT
DRESSING_UB_CURRENT_FUNCTION: INDEPENDENT

## 2024-10-01 ASSESSMENT — PAIN SCALES - PAIN ASSESSMENT IN ADVANCED DEMENTIA (PAINAD)
NEGVOCALIZATION: 0 - NONE.
BODYLANGUAGE: 0
CONSOLABILITY: 0 - NO NEED TO CONSOLE.
FACIALEXPRESSION: 0
NEGVOCALIZATION: 0
BREATHING: 0
TOTALSCORE: 0
FACIALEXPRESSION: 0 - SMILING OR INEXPRESSIVE.
CONSOLABILITY: 0
BODYLANGUAGE: 0 - RELAXED.

## 2024-10-01 ASSESSMENT — ENCOUNTER SYMPTOMS
SUBJECTIVE PAIN PROGRESSION: GRADUALLY IMPROVING
PAIN LOCATION - PAIN SEVERITY: 4/10
HIGHEST PAIN SEVERITY IN PAST 24 HOURS: 6/10
PERSON REPORTING PAIN: PATIENT
PAIN LOCATION - PAIN QUALITY: ACHE
PAIN: 1
PAIN LOCATION - PAIN DURATION: CONSTANT
PAIN: 1
PAIN LOCATION: LEFT KNEE
LOWEST PAIN SEVERITY IN PAST 24 HOURS: 3/10
PAIN LOCATION - RELIEVING FACTORS: REST
PAIN LOCATION - PAIN FREQUENCY: CONSTANT
PAIN LOCATION - EXACERBATING FACTORS: MOBILITY

## 2024-10-02 ASSESSMENT — ENCOUNTER SYMPTOMS
LIMITED RANGE OF MOTION: 1
MUSCLE WEAKNESS: 1

## 2024-10-02 NOTE — CASE COMMUNICATION
DISCHARGE SUMMARY:    DISCIPLINE: PT  DATE OF DISCIPLINE DISCHARGE: 10124  REASON FOR DISCHARGE: goals met  EVALUATION OF GOALS: y  SUMMARY OF CARE PROVIDED: hep instruction   DISCHARGE INSTRUCTIONS GIVEN: yes  SERVICES REMAINING:   NOMNC OBTAINED: na

## 2024-10-05 VITALS
RESPIRATION RATE: 19 BRPM | HEART RATE: 78 BPM | HEART RATE: 80 BPM | SYSTOLIC BLOOD PRESSURE: 122 MMHG | TEMPERATURE: 98.8 F | SYSTOLIC BLOOD PRESSURE: 126 MMHG | RESPIRATION RATE: 19 BRPM | DIASTOLIC BLOOD PRESSURE: 70 MMHG | TEMPERATURE: 98.6 F | DIASTOLIC BLOOD PRESSURE: 70 MMHG | OXYGEN SATURATION: 100 % | OXYGEN SATURATION: 100 %

## 2024-10-05 SDOH — ECONOMIC STABILITY: FOOD INSECURITY: MEALS PER DAY: 3

## 2024-10-05 ASSESSMENT — ENCOUNTER SYMPTOMS
PAIN LOCATION - PAIN QUALITY: ACHY
LOWEST PAIN SEVERITY IN PAST 24 HOURS: 0/10
APPETITE LEVEL: FAIR
HIGHEST PAIN SEVERITY IN PAST 24 HOURS: 0/10
PAIN: 1
PAIN LOCATION - PAIN FREQUENCY: INTERMITTENT
PAIN SEVERITY GOAL: 0/10
LOWEST PAIN SEVERITY IN PAST 24 HOURS: 0/10
PAIN LOCATION - PAIN SEVERITY: 0/10
PAIN LOCATION - PAIN SEVERITY: 0/10
APPETITE LEVEL: GOOD
HIGHEST PAIN SEVERITY IN PAST 24 HOURS: 0/10
PAIN SEVERITY GOAL: 0/10
PERSON REPORTING PAIN: PATIENT
PAIN LOCATION - PAIN FREQUENCY: INTERMITTENT
PAIN LOCATION: GENERALIZED
PAIN: 1
SUBJECTIVE PAIN PROGRESSION: UNCHANGED
CHANGE IN APPETITE: INCREASED
CHANGE IN APPETITE: UNCHANGED
SUBJECTIVE PAIN PROGRESSION: UNCHANGED
PERSON REPORTING PAIN: PATIENT
PAIN LOCATION - PAIN QUALITY: ACHY
PAIN LOCATION: GENERALIZED

## 2024-10-08 ENCOUNTER — APPOINTMENT (OUTPATIENT)
Dept: PRIMARY CARE | Facility: CLINIC | Age: 39
End: 2024-10-08
Payer: COMMERCIAL

## 2024-10-08 ENCOUNTER — HOME INFUSION (OUTPATIENT)
Dept: INFUSION THERAPY | Age: 39
End: 2024-10-08

## 2024-10-08 VITALS
SYSTOLIC BLOOD PRESSURE: 125 MMHG | WEIGHT: 189 LBS | HEIGHT: 71 IN | BODY MASS INDEX: 26.46 KG/M2 | HEART RATE: 84 BPM | DIASTOLIC BLOOD PRESSURE: 82 MMHG

## 2024-10-08 DIAGNOSIS — Z79.4 TYPE 2 DIABETES MELLITUS WITHOUT COMPLICATION, WITH LONG-TERM CURRENT USE OF INSULIN (MULTI): ICD-10-CM

## 2024-10-08 DIAGNOSIS — R21 RASH AND NONSPECIFIC SKIN ERUPTION: Primary | ICD-10-CM

## 2024-10-08 DIAGNOSIS — M00.869 BACTERIAL INFECTION OF KNEE JOINT (MULTI): Primary | ICD-10-CM

## 2024-10-08 DIAGNOSIS — R65.20 SEPSIS WITH ACUTE RENAL FAILURE WITHOUT SEPTIC SHOCK, DUE TO UNSPECIFIED ORGANISM, UNSPECIFIED ACUTE RENAL FAILURE TYPE (MULTI): ICD-10-CM

## 2024-10-08 DIAGNOSIS — M25.50 ARTHRALGIA, UNSPECIFIED JOINT: ICD-10-CM

## 2024-10-08 DIAGNOSIS — N17.9 SEPSIS WITH ACUTE RENAL FAILURE WITHOUT SEPTIC SHOCK, DUE TO UNSPECIFIED ORGANISM, UNSPECIFIED ACUTE RENAL FAILURE TYPE (MULTI): ICD-10-CM

## 2024-10-08 DIAGNOSIS — A41.9 SEPSIS WITH ACUTE RENAL FAILURE WITHOUT SEPTIC SHOCK, DUE TO UNSPECIFIED ORGANISM, UNSPECIFIED ACUTE RENAL FAILURE TYPE (MULTI): ICD-10-CM

## 2024-10-08 DIAGNOSIS — R03.0 BLOOD PRESSURE ELEVATED WITHOUT HISTORY OF HTN: ICD-10-CM

## 2024-10-08 DIAGNOSIS — E11.9 TYPE 2 DIABETES MELLITUS WITHOUT COMPLICATION, WITH LONG-TERM CURRENT USE OF INSULIN (MULTI): ICD-10-CM

## 2024-10-08 PROBLEM — R56.9 SEIZURES (MULTI): Status: ACTIVE | Noted: 2024-10-08

## 2024-10-08 PROBLEM — R53.83 FATIGUE: Status: ACTIVE | Noted: 2024-10-08

## 2024-10-08 PROBLEM — R68.82 DECREASED LIBIDO: Status: ACTIVE | Noted: 2024-10-08

## 2024-10-08 PROBLEM — N52.9 ERECTILE DYSFUNCTION: Status: ACTIVE | Noted: 2024-10-08

## 2024-10-08 PROBLEM — R19.7 DIARRHEA: Status: ACTIVE | Noted: 2024-10-08

## 2024-10-08 PROBLEM — F41.8 ANXIETY ASSOCIATED WITH DEPRESSION: Status: ACTIVE | Noted: 2024-10-08

## 2024-10-08 PROBLEM — J45.909 ASTHMA: Status: ACTIVE | Noted: 2024-10-08

## 2024-10-08 LAB
ALBUMIN SERPL BCP-MCNC: 4 G/DL (ref 3.4–5)
ALP SERPL-CCNC: 70 U/L (ref 33–120)
ALT SERPL W P-5'-P-CCNC: 38 U/L (ref 10–52)
ANION GAP SERPL CALC-SCNC: 16 MMOL/L (ref 10–20)
AST SERPL W P-5'-P-CCNC: 18 U/L (ref 9–39)
BASOPHILS # BLD AUTO: 0.06 X10*3/UL (ref 0–0.1)
BASOPHILS NFR BLD AUTO: 1 %
BILIRUB SERPL-MCNC: 0.3 MG/DL (ref 0–1.2)
BUN SERPL-MCNC: 13 MG/DL (ref 6–23)
CALCIUM SERPL-MCNC: 9.7 MG/DL (ref 8.6–10.6)
CHLORIDE SERPL-SCNC: 101 MMOL/L (ref 98–107)
CHOLEST SERPL-MCNC: 172 MG/DL (ref 0–199)
CHOLESTEROL/HDL RATIO: 3.4
CO2 SERPL-SCNC: 26 MMOL/L (ref 21–32)
CREAT SERPL-MCNC: 0.92 MG/DL (ref 0.5–1.3)
EGFRCR SERPLBLD CKD-EPI 2021: >90 ML/MIN/1.73M*2
EOSINOPHIL # BLD AUTO: 0.75 X10*3/UL (ref 0–0.7)
EOSINOPHIL NFR BLD AUTO: 12.5 %
ERYTHROCYTE [DISTWIDTH] IN BLOOD BY AUTOMATED COUNT: 14.2 % (ref 11.5–14.5)
GLUCOSE SERPL-MCNC: 188 MG/DL (ref 74–99)
HCT VFR BLD AUTO: 34.8 % (ref 41–52)
HDLC SERPL-MCNC: 50.5 MG/DL
HGB BLD-MCNC: 11.4 G/DL (ref 13.5–17.5)
IMM GRANULOCYTES # BLD AUTO: 0.01 X10*3/UL (ref 0–0.7)
IMM GRANULOCYTES NFR BLD AUTO: 0.2 % (ref 0–0.9)
LDLC SERPL CALC-MCNC: 65 MG/DL
LYMPHOCYTES # BLD AUTO: 1.9 X10*3/UL (ref 1.2–4.8)
LYMPHOCYTES NFR BLD AUTO: 31.6 %
MCH RBC QN AUTO: 26 PG (ref 26–34)
MCHC RBC AUTO-ENTMCNC: 32.8 G/DL (ref 32–36)
MCV RBC AUTO: 79 FL (ref 80–100)
MONOCYTES # BLD AUTO: 0.41 X10*3/UL (ref 0.1–1)
MONOCYTES NFR BLD AUTO: 6.8 %
NEUTROPHILS # BLD AUTO: 2.88 X10*3/UL (ref 1.2–7.7)
NEUTROPHILS NFR BLD AUTO: 47.9 %
NON HDL CHOLESTEROL: 122 MG/DL (ref 0–149)
NRBC BLD-RTO: 0 /100 WBCS (ref 0–0)
PLATELET # BLD AUTO: 352 X10*3/UL (ref 150–450)
POTASSIUM SERPL-SCNC: 3.8 MMOL/L (ref 3.5–5.3)
PROT SERPL-MCNC: 7.1 G/DL (ref 6.4–8.2)
RBC # BLD AUTO: 4.39 X10*6/UL (ref 4.5–5.9)
SODIUM SERPL-SCNC: 139 MMOL/L (ref 136–145)
TRIGL SERPL-MCNC: 283 MG/DL (ref 0–149)
VLDL: 57 MG/DL (ref 0–40)
WBC # BLD AUTO: 6 X10*3/UL (ref 4.4–11.3)

## 2024-10-08 PROCEDURE — 3046F HEMOGLOBIN A1C LEVEL >9.0%: CPT | Performed by: FAMILY MEDICINE

## 2024-10-08 PROCEDURE — 85025 COMPLETE CBC W/AUTO DIFF WBC: CPT

## 2024-10-08 PROCEDURE — 4010F ACE/ARB THERAPY RXD/TAKEN: CPT | Performed by: FAMILY MEDICINE

## 2024-10-08 PROCEDURE — 3008F BODY MASS INDEX DOCD: CPT | Performed by: FAMILY MEDICINE

## 2024-10-08 PROCEDURE — 3074F SYST BP LT 130 MM HG: CPT | Performed by: FAMILY MEDICINE

## 2024-10-08 PROCEDURE — 80061 LIPID PANEL: CPT

## 2024-10-08 PROCEDURE — 1036F TOBACCO NON-USER: CPT | Performed by: FAMILY MEDICINE

## 2024-10-08 PROCEDURE — 99214 OFFICE O/P EST MOD 30 MIN: CPT | Performed by: FAMILY MEDICINE

## 2024-10-08 PROCEDURE — 3060F POS MICROALBUMINURIA REV: CPT | Performed by: FAMILY MEDICINE

## 2024-10-08 PROCEDURE — 80053 COMPREHEN METABOLIC PANEL: CPT

## 2024-10-08 PROCEDURE — 3079F DIAST BP 80-89 MM HG: CPT | Performed by: FAMILY MEDICINE

## 2024-10-08 PROCEDURE — 85652 RBC SED RATE AUTOMATED: CPT

## 2024-10-08 RX ORDER — PANTOPRAZOLE SODIUM 40 MG/1
TABLET, DELAYED RELEASE ORAL
COMMUNITY

## 2024-10-08 RX ORDER — BLOOD-GLUCOSE SENSOR
EACH MISCELLANEOUS
Qty: 2 EACH | Refills: 1 | Status: SHIPPED | OUTPATIENT
Start: 2024-10-08

## 2024-10-08 RX ORDER — MELOXICAM 15 MG/1
15 TABLET ORAL DAILY
Qty: 10 TABLET | Refills: 0 | Status: SHIPPED | OUTPATIENT
Start: 2024-10-08 | End: 2024-10-22

## 2024-10-08 ASSESSMENT — ENCOUNTER SYMPTOMS
ARTHRALGIAS: 1
LOSS OF SENSATION IN FEET: 0
RESPIRATORY NEGATIVE: 1
DEPRESSION: 0
CARDIOVASCULAR NEGATIVE: 1
GASTROINTESTINAL NEGATIVE: 1
CONSTITUTIONAL NEGATIVE: 1
OCCASIONAL FEELINGS OF UNSTEADINESS: 0

## 2024-10-08 NOTE — PROGRESS NOTES
"Subjective   Patient ID: Gabe Linder Jr. is a 39 y.o. male who presents for No chief complaint on file..    HPI pt fu hosp for sepsis just finishing antibiiotics through pic crist[facility]ian  Htn , new onset dm , gerd, arthroalgias    Review of Systems   Constitutional: Negative.    HENT: Negative.     Respiratory: Negative.     Cardiovascular: Negative.    Gastrointestinal: Negative.    Musculoskeletal:  Positive for arthralgias and gait problem.       Objective   /82   Pulse 84   Ht 1.803 m (5' 11\")   Wt 85.7 kg (189 lb)   BMI 26.36 kg/m²     Physical Exam  Vitals reviewed.   Constitutional:       Appearance: Normal appearance. He is normal weight.   Eyes:      Extraocular Movements: Extraocular movements intact.      Conjunctiva/sclera: Conjunctivae normal.      Pupils: Pupils are equal, round, and reactive to light.   Cardiovascular:      Rate and Rhythm: Normal rate and regular rhythm.      Pulses: Normal pulses.      Heart sounds: Normal heart sounds.   Pulmonary:      Effort: Pulmonary effort is normal.      Breath sounds: Normal breath sounds.   Abdominal:      General: Bowel sounds are normal.      Palpations: Abdomen is soft.   Musculoskeletal:         General: Normal range of motion.      Comments: Still r knee pain and generalize arthalgias   Skin:     General: Skin is warm and dry.   Neurological:      General: No focal deficit present.      Mental Status: He is alert and oriented to person, place, and time. Mental status is at baseline.         Assessment/Plan   Problem List Items Addressed This Visit             ICD-10-CM    Blood pressure elevated without history of HTN R03.0    Sepsis (Multi) A41.9     Other Visit Diagnoses         Codes    Rash and nonspecific skin eruption    -  Primary R21    Type 2 diabetes mellitus without complication, with long-term current use of insulin (Multi)     E11.9, Z79.4        Htn on amlodipine and doing well no concerns withg that  Dm new onset discussed fam hx " and diagnosis - will cont metfromin and start freesytle jarrod 3 and ajdust medication bassed on home sugars, already had diabetic education and nutritisist and started low carb diet , will see pt in a month to measure progress  Septic joint w septis responded well to iv antibiotic will get pic line rremoved tomm and check cbc today   Elevated esr cont to follow count and start mobic for 10 days now that he is off antibiotic

## 2024-10-08 NOTE — PROGRESS NOTES
Pt comes in for fu from the hospital for sepsis. Pt was at [facility]. Pt originally went in bc he wasn't feeling good at the beginning of September. And then was dx with the sepsis. Pt was also dx with new onset dm while in the hospital. Pt still has a picc line and wants to know when and where he would get that removed?

## 2024-10-08 NOTE — PROGRESS NOTES
Received orders from Dr Sidhu via Tammy  Stop antibiotic as ordered after today's dose. HC RN to remove line.     Order entered into Epic. Gold copy to intake.    [facility] spoke with pt, confirmed end of tx today and RN currently scheduled for 10/10. He verbalizes understanding to flush line daily until RN comes out and states he has flushes in home.    FU 10/11 check if line pulled and discharge from services.

## 2024-10-09 ENCOUNTER — HOME CARE VISIT (OUTPATIENT)
Dept: HOME HEALTH SERVICES | Facility: HOME HEALTH | Age: 39
End: 2024-10-09
Payer: COMMERCIAL

## 2024-10-09 LAB — ERYTHROCYTE [SEDIMENTATION RATE] IN BLOOD BY WESTERGREN METHOD: 63 MM/H (ref 0–15)

## 2024-10-09 PROCEDURE — G0299 HHS/HOSPICE OF RN EA 15 MIN: HCPCS

## 2024-10-10 ENCOUNTER — HOME INFUSION (OUTPATIENT)
Dept: INFUSION THERAPY | Age: 39
End: 2024-10-10
Payer: COMMERCIAL

## 2024-10-10 ENCOUNTER — TELEPHONE (OUTPATIENT)
Dept: PRIMARY CARE | Facility: CLINIC | Age: 39
End: 2024-10-10
Payer: COMMERCIAL

## 2024-10-10 NOTE — PROGRESS NOTES
PICC pulled 10/9 by HC RN. No further needs from [facility] pharmacy.    Pt care rep to discharge pt from [facility] and discharge chart

## 2024-10-10 NOTE — TELEPHONE ENCOUNTER
----- Message from Chad AMES sent at 10/10/2024 10:47 AM EDT -----      ----- Message -----  From: Ren Villasenor MD  Sent: 10/9/2024   4:51 PM EDT  To: Az Deutsch; Chad Roy MA    All results are stable  no change

## 2024-10-13 VITALS
RESPIRATION RATE: 20 BRPM | HEART RATE: 80 BPM | DIASTOLIC BLOOD PRESSURE: 70 MMHG | SYSTOLIC BLOOD PRESSURE: 114 MMHG | OXYGEN SATURATION: 100 % | TEMPERATURE: 98.6 F

## 2024-10-13 ASSESSMENT — ACTIVITIES OF DAILY LIVING (ADL)
HOME_HEALTH_OASIS: 00
OASIS_M1830: 00

## 2024-10-13 ASSESSMENT — ENCOUNTER SYMPTOMS
HIGHEST PAIN SEVERITY IN PAST 24 HOURS: 0/10
SUBJECTIVE PAIN PROGRESSION: UNCHANGED
PAIN LOCATION - PAIN QUALITY: ACHY
PAIN LOCATION: GENERALIZED
PERSON REPORTING PAIN: PATIENT
PAIN LOCATION - PAIN FREQUENCY: INTERMITTENT
PAIN: 1
LOWEST PAIN SEVERITY IN PAST 24 HOURS: 0/10
PAIN LOCATION - PAIN SEVERITY: 0/10
PAIN SEVERITY GOAL: 0/10

## 2024-10-21 DIAGNOSIS — I10 PRIMARY HYPERTENSION: ICD-10-CM

## 2024-10-21 DIAGNOSIS — E11.9 CONTROLLED TYPE 2 DIABETES MELLITUS WITHOUT COMPLICATION, WITHOUT LONG-TERM CURRENT USE OF INSULIN (MULTI): ICD-10-CM

## 2024-10-21 DIAGNOSIS — K21.00 GASTROESOPHAGEAL REFLUX DISEASE WITH ESOPHAGITIS WITHOUT HEMORRHAGE: Primary | ICD-10-CM

## 2024-10-21 RX ORDER — AMLODIPINE BESYLATE 10 MG/1
10 TABLET ORAL DAILY
Qty: 90 TABLET | Refills: 1 | Status: SHIPPED | OUTPATIENT
Start: 2024-10-21 | End: 2025-04-19

## 2024-10-21 RX ORDER — METFORMIN HYDROCHLORIDE 500 MG/1
500 TABLET ORAL
Qty: 180 TABLET | Refills: 1 | Status: SHIPPED | OUTPATIENT
Start: 2024-10-21 | End: 2025-04-19

## 2024-10-21 RX ORDER — PANTOPRAZOLE SODIUM 40 MG/1
40 TABLET, DELAYED RELEASE ORAL
Qty: 90 TABLET | Refills: 1 | Status: SHIPPED | OUTPATIENT
Start: 2024-10-21

## 2024-10-29 ENCOUNTER — APPOINTMENT (OUTPATIENT)
Dept: INFECTIOUS DISEASES | Facility: CLINIC | Age: 39
End: 2024-10-29
Payer: COMMERCIAL

## 2024-10-29 ENCOUNTER — TELEPHONE (OUTPATIENT)
Dept: PRIMARY CARE | Facility: CLINIC | Age: 39
End: 2024-10-29

## 2024-11-14 ENCOUNTER — APPOINTMENT (OUTPATIENT)
Dept: PRIMARY CARE | Facility: CLINIC | Age: 39
End: 2024-11-14
Payer: COMMERCIAL

## 2024-11-14 VITALS
SYSTOLIC BLOOD PRESSURE: 130 MMHG | HEART RATE: 85 BPM | DIASTOLIC BLOOD PRESSURE: 70 MMHG | BODY MASS INDEX: 28 KG/M2 | WEIGHT: 200 LBS | HEIGHT: 71 IN

## 2024-11-14 DIAGNOSIS — M00.9 PYOGENIC ARTHRITIS OF LEFT KNEE JOINT, DUE TO UNSPECIFIED ORGANISM (MULTI): ICD-10-CM

## 2024-11-14 DIAGNOSIS — Z79.4 TYPE 2 DIABETES MELLITUS WITHOUT COMPLICATION, WITH LONG-TERM CURRENT USE OF INSULIN (MULTI): ICD-10-CM

## 2024-11-14 DIAGNOSIS — E11.9 TYPE 2 DIABETES MELLITUS WITHOUT COMPLICATION, WITH LONG-TERM CURRENT USE OF INSULIN (MULTI): ICD-10-CM

## 2024-11-14 DIAGNOSIS — E11.9 TYPE 2 DIABETES MELLITUS WITHOUT COMPLICATION, WITHOUT LONG-TERM CURRENT USE OF INSULIN (MULTI): ICD-10-CM

## 2024-11-14 DIAGNOSIS — R03.0 BLOOD PRESSURE ELEVATED WITHOUT HISTORY OF HTN: ICD-10-CM

## 2024-11-14 DIAGNOSIS — M00.9: Primary | ICD-10-CM

## 2024-11-14 LAB — POC HEMOGLOBIN A1C: 7.4 % (ref 4.2–6.5)

## 2024-11-14 PROCEDURE — 3078F DIAST BP <80 MM HG: CPT | Performed by: FAMILY MEDICINE

## 2024-11-14 PROCEDURE — 3048F LDL-C <100 MG/DL: CPT | Performed by: FAMILY MEDICINE

## 2024-11-14 PROCEDURE — 99214 OFFICE O/P EST MOD 30 MIN: CPT | Performed by: FAMILY MEDICINE

## 2024-11-14 PROCEDURE — 3046F HEMOGLOBIN A1C LEVEL >9.0%: CPT | Performed by: FAMILY MEDICINE

## 2024-11-14 PROCEDURE — 3075F SYST BP GE 130 - 139MM HG: CPT | Performed by: FAMILY MEDICINE

## 2024-11-14 PROCEDURE — 83036 HEMOGLOBIN GLYCOSYLATED A1C: CPT | Performed by: FAMILY MEDICINE

## 2024-11-14 PROCEDURE — 1036F TOBACCO NON-USER: CPT | Performed by: FAMILY MEDICINE

## 2024-11-14 PROCEDURE — 3060F POS MICROALBUMINURIA REV: CPT | Performed by: FAMILY MEDICINE

## 2024-11-14 PROCEDURE — 3008F BODY MASS INDEX DOCD: CPT | Performed by: FAMILY MEDICINE

## 2024-11-14 ASSESSMENT — ENCOUNTER SYMPTOMS
RESPIRATORY NEGATIVE: 1
CARDIOVASCULAR NEGATIVE: 1
OCCASIONAL FEELINGS OF UNSTEADINESS: 1
GASTROINTESTINAL NEGATIVE: 1
LOSS OF SENSATION IN FEET: 0
CONSTITUTIONAL NEGATIVE: 1
DEPRESSION: 0

## 2024-11-14 NOTE — PROGRESS NOTES
"Subjective   Patient ID: Gabe Linder Jr. is a 39 y.o. male who presents for No chief complaint on file..    HPI fu new onset dm , acute septic joint knee sp antibiotics and surgical tx, htn , gerd  Pt doing well w dim diet and metformin and checing sugars  Pt still having trouble w left knee flexion and stability for standing for long periods    Review of Systems   Constitutional: Negative.    HENT: Negative.     Respiratory: Negative.     Cardiovascular: Negative.    Gastrointestinal: Negative.    Musculoskeletal:  Positive for gait problem.        Left knee pain and swelling and stiffness and still unable to get to 90 degree flexion       Objective   BP (!) 143/93   Pulse 85   Ht 1.803 m (5' 11\")   Wt 90.7 kg (200 lb)   BMI 27.89 kg/m²     Physical Exam  Vitals reviewed.   Constitutional:       Appearance: Normal appearance. He is normal weight.   Eyes:      Extraocular Movements: Extraocular movements intact.      Conjunctiva/sclera: Conjunctivae normal.      Pupils: Pupils are equal, round, and reactive to light.   Cardiovascular:      Rate and Rhythm: Normal rate and regular rhythm.      Pulses: Normal pulses.      Heart sounds: Normal heart sounds.   Pulmonary:      Effort: Pulmonary effort is normal.      Breath sounds: Normal breath sounds.   Abdominal:      General: Bowel sounds are normal.      Palpations: Abdomen is soft.   Musculoskeletal:         General: Normal range of motion.      Comments: Left knee unable to flex to 90 or mor and still stiff and sore w ambulation      Skin:     General: Skin is warm and dry.   Neurological:      General: No focal deficit present.      Mental Status: He is alert and oriented to person, place, and time. Mental status is at baseline.         Assessment/Plan   Problem List Items Addressed This Visit             ICD-10-CM    Blood pressure elevated without history of HTN R03.0     Other Visit Diagnoses         Codes    Pyogenic arthritis of multiple sites, due " to unspecified organism (Multi)    -  Primary M00.9    Relevant Orders    Referral to Physical Therapy    Type 2 diabetes mellitus without complication, with long-term current use of insulin (Multi)     E11.9, Z79.4    Relevant Orders    POCT glycosylated hemoglobin (Hb A1C) manually resulted (Completed)    Type 2 diabetes mellitus without complication, without long-term current use of insulin (Multi)     E11.9    Pyogenic arthritis of left knee joint, due to unspecified organism (Multi)     M00.9    Relevant Orders    Referral to Physical Therapy        Dm doing excellent w metformin and diet modification A1c down from 9.l4 to 7.3  in only 6 wks will cont on current tx  Acute septic joint now sp surg and iv inf antibiotics still w sig stiffness and poor flexion ability will need outpt physical therapy to work w pt on strengthening and flexability and to do functional capacitiy assesment to determine rtw date and capacity for spc duties , given letter to extend fmla and disabiliiity till this is done

## 2024-11-14 NOTE — LETTER
November 14, 2024     Patient: Gabe Linder Jr.   YOB: 1985   Date of Visit: 11/14/2024       To Whom It May Concern:    Gabe Linder was seen in my clinic on 11/14/2024 at 1:00 pm.   Pt is here for fu septic arthritis in left knee , sp pic line and iv antibiotics  and infection is   Resolved but having sig scar tissue and inflammation in left joint will send to physical therapy  For rehab and also for a functional capacity exam> will continue to be off work until  This eam takes place so for up to 1 month from today  Pt has been extremely compliant and motivated and should ultimately do well          Sincerely,         Ren Villasenor MD        CC: No Recipients

## 2025-05-14 DIAGNOSIS — E11.9 CONTROLLED TYPE 2 DIABETES MELLITUS WITHOUT COMPLICATION, WITHOUT LONG-TERM CURRENT USE OF INSULIN: ICD-10-CM

## 2025-05-14 DIAGNOSIS — K21.00 GASTROESOPHAGEAL REFLUX DISEASE WITH ESOPHAGITIS WITHOUT HEMORRHAGE: ICD-10-CM

## 2025-05-14 RX ORDER — PANTOPRAZOLE SODIUM 40 MG/1
40 TABLET, DELAYED RELEASE ORAL
Qty: 30 TABLET | Refills: 0 | Status: SHIPPED | OUTPATIENT
Start: 2025-05-14 | End: 2025-06-13

## 2025-05-14 RX ORDER — METFORMIN HYDROCHLORIDE 500 MG/1
TABLET ORAL
Qty: 60 TABLET | Refills: 0 | Status: SHIPPED | OUTPATIENT
Start: 2025-05-14

## 2025-05-22 DIAGNOSIS — I10 PRIMARY HYPERTENSION: ICD-10-CM

## 2025-05-22 RX ORDER — AMLODIPINE BESYLATE 10 MG/1
10 TABLET ORAL DAILY
Qty: 30 TABLET | Refills: 0 | Status: SHIPPED
Start: 2025-05-22 | End: 2025-06-21

## 2025-05-30 DIAGNOSIS — I10 PRIMARY HYPERTENSION: ICD-10-CM

## 2025-05-30 RX ORDER — AMLODIPINE BESYLATE 10 MG/1
10 TABLET ORAL DAILY
Qty: 30 TABLET | Refills: 0 | Status: SHIPPED | OUTPATIENT
Start: 2025-05-30 | End: 2025-06-29

## 2025-06-06 ENCOUNTER — APPOINTMENT (OUTPATIENT)
Dept: PRIMARY CARE | Facility: CLINIC | Age: 40
End: 2025-06-06
Payer: COMMERCIAL

## 2025-06-06 VITALS
WEIGHT: 199.6 LBS | DIASTOLIC BLOOD PRESSURE: 82 MMHG | BODY MASS INDEX: 27.94 KG/M2 | HEIGHT: 71 IN | HEART RATE: 67 BPM | SYSTOLIC BLOOD PRESSURE: 135 MMHG

## 2025-06-06 DIAGNOSIS — E11.9 TYPE 2 DIABETES MELLITUS WITHOUT COMPLICATION, WITH LONG-TERM CURRENT USE OF INSULIN: ICD-10-CM

## 2025-06-06 DIAGNOSIS — I10 ESSENTIAL HYPERTENSION, BENIGN: Primary | ICD-10-CM

## 2025-06-06 DIAGNOSIS — Z79.4 TYPE 2 DIABETES MELLITUS WITHOUT COMPLICATION, WITH LONG-TERM CURRENT USE OF INSULIN: ICD-10-CM

## 2025-06-06 DIAGNOSIS — E78.5 HYPERLIPIDEMIA, UNSPECIFIED HYPERLIPIDEMIA TYPE: ICD-10-CM

## 2025-06-06 LAB — POC HEMOGLOBIN A1C: 8.1 % (ref 4.2–6.5)

## 2025-06-06 PROCEDURE — 99214 OFFICE O/P EST MOD 30 MIN: CPT | Performed by: FAMILY MEDICINE

## 2025-06-06 PROCEDURE — 3052F HG A1C>EQUAL 8.0%<EQUAL 9.0%: CPT | Performed by: FAMILY MEDICINE

## 2025-06-06 PROCEDURE — 83036 HEMOGLOBIN GLYCOSYLATED A1C: CPT | Performed by: FAMILY MEDICINE

## 2025-06-06 PROCEDURE — 1036F TOBACCO NON-USER: CPT | Performed by: FAMILY MEDICINE

## 2025-06-06 PROCEDURE — 3079F DIAST BP 80-89 MM HG: CPT | Performed by: FAMILY MEDICINE

## 2025-06-06 PROCEDURE — 3008F BODY MASS INDEX DOCD: CPT | Performed by: FAMILY MEDICINE

## 2025-06-06 PROCEDURE — 3075F SYST BP GE 130 - 139MM HG: CPT | Performed by: FAMILY MEDICINE

## 2025-06-06 ASSESSMENT — ENCOUNTER SYMPTOMS
NEUROLOGICAL NEGATIVE: 1
GASTROINTESTINAL NEGATIVE: 1
DEPRESSION: 0
LOSS OF SENSATION IN FEET: 0
MUSCULOSKELETAL NEGATIVE: 1
CONSTITUTIONAL NEGATIVE: 1
OCCASIONAL FEELINGS OF UNSTEADINESS: 0
RESPIRATORY NEGATIVE: 1
CARDIOVASCULAR NEGATIVE: 1

## 2025-06-06 NOTE — PROGRESS NOTES
"Subjective   Patient ID: Gabe Linder Jr. is a 39 y.o. male who presents for No chief complaint on file..    HPI htn, gerd, dm    Review of Systems   Constitutional: Negative.    HENT: Negative.     Respiratory: Negative.     Cardiovascular: Negative.    Gastrointestinal: Negative.    Musculoskeletal: Negative.    Neurological: Negative.    Hyperglycemia has been eating poorly lately    Objective   /82   Pulse 67   Ht 1.803 m (5' 11\")   Wt 90.5 kg (199 lb 9.6 oz)   BMI 27.84 kg/m²     Physical Exam  Vitals reviewed.   Constitutional:       Appearance: Normal appearance. He is normal weight.   Eyes:      Extraocular Movements: Extraocular movements intact.      Conjunctiva/sclera: Conjunctivae normal.      Pupils: Pupils are equal, round, and reactive to light.   Cardiovascular:      Rate and Rhythm: Normal rate and regular rhythm.      Pulses: Normal pulses.      Heart sounds: Normal heart sounds.   Pulmonary:      Effort: Pulmonary effort is normal.      Breath sounds: Normal breath sounds.   Abdominal:      General: Bowel sounds are normal.      Palpations: Abdomen is soft.   Musculoskeletal:         General: Normal range of motion.   Skin:     General: Skin is warm and dry.   Neurological:      General: No focal deficit present.      Mental Status: He is alert and oriented to person, place, and time. Mental status is at baseline.         Assessment/Plan   Problem List Items Addressed This Visit    None  Visit Diagnoses         Codes      Essential hypertension, benign    -  Primary I10    Relevant Orders    Comprehensive Metabolic Panel      Type 2 diabetes mellitus without complication, with long-term current use of insulin     E11.9, Z79.4    Relevant Medications    empagliflozin (Jardiance) 25 mg tablet    Other Relevant Orders    POCT glycosylated hemoglobin (Hb A1C) manually resulted (Completed)      Hyperlipidemia, unspecified hyperlipidemia type     E78.5    Relevant Orders    Lipid Panel " From what I can tell, his machine was given to him only about 2.5 years ago, so it should be assessed by his supply company, as you rightly suggested. Could you please check in with him and see if it is working again or if he spoke to the supply company? Thank you!        Dm uncont will start jardiance 25 mg daily and adjust diet and stop fast food and fu in 3mo

## 2025-06-07 LAB
ALBUMIN SERPL-MCNC: 4.5 G/DL (ref 3.6–5.1)
ALP SERPL-CCNC: 55 U/L (ref 36–130)
ALT SERPL-CCNC: 53 U/L (ref 9–46)
ANION GAP SERPL CALCULATED.4IONS-SCNC: 11 MMOL/L (CALC) (ref 7–17)
AST SERPL-CCNC: 37 U/L (ref 10–40)
BILIRUB SERPL-MCNC: 0.4 MG/DL (ref 0.2–1.2)
BUN SERPL-MCNC: 9 MG/DL (ref 7–25)
CALCIUM SERPL-MCNC: 9.6 MG/DL (ref 8.6–10.3)
CHLORIDE SERPL-SCNC: 101 MMOL/L (ref 98–110)
CHOLEST SERPL-MCNC: 166 MG/DL
CHOLEST/HDLC SERPL: 2.3 (CALC)
CO2 SERPL-SCNC: 27 MMOL/L (ref 20–32)
CREAT SERPL-MCNC: 0.83 MG/DL (ref 0.6–1.29)
EGFRCR SERPLBLD CKD-EPI 2021: 113 ML/MIN/1.73M2
GLUCOSE SERPL-MCNC: 191 MG/DL (ref 65–99)
HDLC SERPL-MCNC: 72 MG/DL
LDLC SERPL CALC-MCNC: 69 MG/DL (CALC)
NONHDLC SERPL-MCNC: 94 MG/DL (CALC)
POTASSIUM SERPL-SCNC: 3.9 MMOL/L (ref 3.5–5.3)
PROT SERPL-MCNC: 7.4 G/DL (ref 6.1–8.1)
SODIUM SERPL-SCNC: 139 MMOL/L (ref 135–146)
TRIGL SERPL-MCNC: 186 MG/DL

## 2025-06-09 ENCOUNTER — TELEPHONE (OUTPATIENT)
Dept: PRIMARY CARE | Facility: CLINIC | Age: 40
End: 2025-06-09
Payer: COMMERCIAL

## 2025-06-09 NOTE — TELEPHONE ENCOUNTER
----- Message from Az Deutsch sent at 6/9/2025  8:56 AM EDT -----    ----- Message -----  From: Ren Villasenor MD  Sent: 6/7/2025  12:49 PM EDT  To: Az Deutsch; MIKE Cardenas    All results are stable  no change except the sugar as we discussed  ----- Message -----  From: MIKE Cardenas  Sent: 6/6/2025   1:49 PM EDT  To: Ren Villasenor MD

## 2025-06-17 DIAGNOSIS — E11.9 CONTROLLED TYPE 2 DIABETES MELLITUS WITHOUT COMPLICATION, WITHOUT LONG-TERM CURRENT USE OF INSULIN: ICD-10-CM

## 2025-06-17 DIAGNOSIS — K21.00 GASTROESOPHAGEAL REFLUX DISEASE WITH ESOPHAGITIS WITHOUT HEMORRHAGE: ICD-10-CM

## 2025-06-19 RX ORDER — PANTOPRAZOLE SODIUM 40 MG/1
40 TABLET, DELAYED RELEASE ORAL
Qty: 90 TABLET | Refills: 0 | Status: SHIPPED | OUTPATIENT
Start: 2025-06-19

## 2025-06-19 RX ORDER — METFORMIN HYDROCHLORIDE 500 MG/1
TABLET ORAL
Qty: 180 TABLET | Refills: 0 | Status: SHIPPED | OUTPATIENT
Start: 2025-06-19

## 2025-07-07 DIAGNOSIS — I10 PRIMARY HYPERTENSION: ICD-10-CM

## 2025-07-07 RX ORDER — AMLODIPINE BESYLATE 10 MG/1
10 TABLET ORAL DAILY
Qty: 90 TABLET | Refills: 0 | Status: SHIPPED | OUTPATIENT
Start: 2025-07-07 | End: 2025-08-06

## (undated) DEVICE — GLOVE, SURGICAL, PROTEXIS PI , 7.0, PF, LF

## (undated) DEVICE — Device

## (undated) DEVICE — GLOVE, SURGICAL, PROTEXIS PI ORTHO, 8.0, PF, LF

## (undated) DEVICE — APPLICATOR, CHLORAPREP, W/ORANGE TINT, 26ML

## (undated) DEVICE — SUTURE, VICRYL, 1, 27 IN, CT-1, VIOLET

## (undated) DEVICE — GLOVE, SURGICAL, PROTEXIS PI MICRO, 7.5, PF, LF

## (undated) DEVICE — SOLUTION, IRRIGATION, SODIUM CHLORIDE 0.9%, 1000 ML, POUR BOTTLE

## (undated) DEVICE — GLOVE, SURGICAL, PROTEXIS PI ORTHO, 6.5, PF, LF

## (undated) DEVICE — BANDAGE, COFLEX, 4 X 5 YDS, FOAM TAN, STERILE, LF